# Patient Record
Sex: FEMALE | Race: WHITE | Employment: OTHER | URBAN - NONMETROPOLITAN AREA
[De-identification: names, ages, dates, MRNs, and addresses within clinical notes are randomized per-mention and may not be internally consistent; named-entity substitution may affect disease eponyms.]

---

## 2018-01-02 LAB — PAIN MANAGEMENT DRUG PANEL INTERP, URINE: NORMAL

## 2019-05-24 ENCOUNTER — HOSPITAL ENCOUNTER (OUTPATIENT)
Age: 59
Setting detail: OUTPATIENT SURGERY
Discharge: HOME | End: 2019-05-24
Attending: INTERNAL MEDICINE | Admitting: INTERNAL MEDICINE
Payer: MEDICARE

## 2019-05-24 VITALS
DIASTOLIC BLOOD PRESSURE: 51 MMHG | TEMPERATURE: 97.8 F | HEIGHT: 61 IN | BODY MASS INDEX: 44.56 KG/M2 | OXYGEN SATURATION: 95 % | SYSTOLIC BLOOD PRESSURE: 88 MMHG | RESPIRATION RATE: 18 BRPM | WEIGHT: 236 LBS | HEART RATE: 65 BPM

## 2019-05-24 PROCEDURE — 3609015500 HC GASTRIC/DUODENAL MOTILITY &/OR MANOMETRY STUDY: Performed by: INTERNAL MEDICINE

## 2019-05-24 PROCEDURE — 6370000000 HC RX 637 (ALT 250 FOR IP)

## 2019-05-24 PROCEDURE — 2709999900 HC NON-CHARGEABLE SUPPLY: Performed by: INTERNAL MEDICINE

## 2019-05-24 RX ORDER — ASPIRIN 81 MG/1
81 TABLET, CHEWABLE ORAL DAILY
Status: ON HOLD | COMMUNITY
End: 2021-06-16 | Stop reason: HOSPADM

## 2019-05-24 RX ORDER — ALPRAZOLAM 1 MG/1
1 TABLET ORAL NIGHTLY PRN
Status: ON HOLD | COMMUNITY
End: 2021-06-10 | Stop reason: ALTCHOICE

## 2019-05-24 ASSESSMENT — PAIN - FUNCTIONAL ASSESSMENT: PAIN_FUNCTIONAL_ASSESSMENT: 0-10

## 2019-05-24 NOTE — PROGRESS NOTES
[facility]  Endoscopy  Nurse's Progress Note    EFT     Patient admitted to Endoscopy for EFT consent signed  Manometry probe passed through right nare into the stomach. Liquid swallows at  50 cm. Patient tolerated well. Discharged home per self.     Josh Zacarias RN   5/24/2019  10:47 AM

## 2019-05-31 NOTE — PROCEDURES
[address] [place]  [address]                High Resolution Esophageal Manometry Study      Patient:  Kya Koehler    [id number] Gender: Female Physician: DR. Aldair Pendleton     / Age: 1960 : Kamila Veras RN    Height: 5 ft 1 in Referring Physician: DR. Adina Biswas    Procedure: Esophageal Manometry with Impedance Examination Date: 2019     Lower Esophageal Sphincter Region  Normal Esophageal Motility  Normal   Landmarks   Number of swallows evaluated 10        LES midpoint (from nares)(cm) 44.0  High Resolution Parameters         Proximal LES (from nares)(cm) 43.0      Distal contractile integral(mean)(mmHg-cm-s) 5607.5 500-5000       Distal LES (from nares)(cm) 47.0      Distal contractile integral(highest)(mmHg-cm-s) 8899.8        LES length(cm) 4.0 2.7-4.8     Contractile front velocity(cm/s) 1.6 <9.0       Esophageal length (LES-UES centers)(cm) 25.0  Wann Classification         PIP (from nares)(cm) 44.5      Distal latency 3.7        Intraabdominal LES length(cm) 2.5      % failed (Wann Classification) 10        Hiatal hernia? No      % panesophageal pressurization 10    LES Pressures       % premature contraction 30        Pressure mia. method eSleeve,IRP      % rapid contraction 0        Basal (respiratory min. )(mmHg) 19.3 4.8-32.0     % large breaks 0        Basal (respiratory mean)(mmHg) 30.0 13-43     % small breaks 0        Residual (mean)(mmHg) 14.2 <15.0 Impedance analysis            Incomplete bolus clearance(%) 10            Upper Esophageal Sphincter  Normal Pharyngeal / UES Motility  Normal   Location (center, fr. nares)(cm) 19.0  No. swallows evaluated 10    Mean basal pressure(mmHg) 49.4  Evaluated @ 3.0 & N/A above UES     Mean residual pressure(mmHg) 1.2 <12.0     Mean peak pressure(mmHg) 9.5           Wann Classification Findings*     EGJ: Normal Relaxation          Mean IRP (14.2 mmHg) is less than 15 mmHg  Esophageal body: Spastic esophagus          % premature contraction (30%) is greater than 20%  Finding: Distal Esophageal Spasm; no EGJ outflow obstruction  EGJ: Normal Relaxation          Mean IRP (14.2 mmHg) is less than 15 mmHg  Esophageal body: Hypercontractile          At least one swallow with DCI greater than 8000  Finding: Hypercontractile Esophagus; no EGJ outflow obstruction    * Findings are based on published [place] Classification scheme and are only intended to serve as a guide for patient diagnosis     Procedure   After confirmation of potential allergies, a topical analgesic was used to numb the nares followed by trans-nasal insertion of a High Resolution Manometry Catheter. Pressure bands of both UES and LES were observed on the color contour. Patient instructed to take deep breath to verify placement of catheter; diaphragmatic pinch noted on inspiration. Patient was assisted to supine position and catheter was stabilized. Patient encouraged to relax while acclimating to catheter for approximately 5 minutes. A 30 second baseline pressure was obtained to identify the UES and LES followed by a series of wet swallows using 5 ccs room temperature water to assess esophageal motility. At the conclusion of the procedure; the catheter was removed. Indications   DYSPHAGIA     Interpretation / Findings   UES Pressure and relaxation:  normal  Peristalsis: premature/spastic  Esophageal pressures:  high  GEJ relaxation:  complete  Incomplete bolus clearance: <10%     Impressions   Hypercontractile esophagus, aka \"Nutcracker esophagus\" or \"Jackhammer esophagus\"  Distal Esophageal Spasm  Usually these disorders are related to GERD. Recommend aggressive treatment of GERD. Nitrates or calcium channel blockers may also be used especially with dysphagia.       Jah Hernandez MD  3:39 PM 5/31/2019

## 2021-06-09 ENCOUNTER — HOSPITAL ENCOUNTER (INPATIENT)
Age: 61
LOS: 6 days | Discharge: HOME | DRG: 242 | End: 2021-06-16
Attending: FAMILY MEDICINE | Admitting: FAMILY MEDICINE
Payer: MEDICARE

## 2021-06-09 DIAGNOSIS — D50.8 IRON DEFICIENCY ANEMIA SECONDARY TO INADEQUATE DIETARY IRON INTAKE: Primary | ICD-10-CM

## 2021-06-09 DIAGNOSIS — E11.9 TYPE 2 DIABETES MELLITUS WITHOUT COMPLICATION, WITHOUT LONG-TERM CURRENT USE OF INSULIN (HCC): ICD-10-CM

## 2021-06-09 PROBLEM — K92.2 GI BLEED: Status: ACTIVE | Noted: 2021-06-09

## 2021-06-09 LAB
ALBUMIN SERPL-MCNC: 3.9 G/DL (ref 3.5–5.1)
ALLEN TEST: NORMAL
ALP BLD-CCNC: 91 U/L (ref 38–126)
ALT SERPL-CCNC: 18 U/L (ref 11–66)
ANION GAP SERPL CALCULATED.3IONS-SCNC: 12 MEQ/L (ref 8–16)
ANISOCYTOSIS: PRESENT
AST SERPL-CCNC: 21 U/L (ref 5–40)
BASE EXCESS (CALCULATED): 0.3 MMOL/L (ref -2.5–2.5)
BASOPHILS # BLD: 0.6 %
BASOPHILS ABSOLUTE: 0.1 THOU/MM3 (ref 0–0.1)
BILIRUB SERPL-MCNC: 1.2 MG/DL (ref 0.3–1.2)
BILIRUBIN URINE: NEGATIVE
BLOOD, URINE: NEGATIVE
BUN BLDV-MCNC: 36 MG/DL (ref 7–22)
CALCIUM SERPL-MCNC: 10 MG/DL (ref 8.5–10.5)
CHARACTER, URINE: CLEAR
CHLORIDE BLD-SCNC: 103 MEQ/L (ref 98–111)
CO2: 25 MEQ/L (ref 23–33)
COLLECTED BY:: NORMAL
COLOR: YELLOW
CREAT SERPL-MCNC: 1.1 MG/DL (ref 0.4–1.2)
DEVICE: NORMAL
EKG ATRIAL RATE: 52 BPM
EKG Q-T INTERVAL: 520 MS
EKG QRS DURATION: 102 MS
EKG QTC CALCULATION (BAZETT): 455 MS
EKG R AXIS: 74 DEGREES
EKG T AXIS: 136 DEGREES
EKG VENTRICULAR RATE: 46 BPM
EOSINOPHIL # BLD: 0.9 %
EOSINOPHILS ABSOLUTE: 0.1 THOU/MM3 (ref 0–0.4)
ERYTHROCYTE [DISTWIDTH] IN BLOOD BY AUTOMATED COUNT: 19.3 % (ref 11.5–14.5)
ERYTHROCYTE [DISTWIDTH] IN BLOOD BY AUTOMATED COUNT: 68 FL (ref 35–45)
FERRITIN: 97 NG/ML (ref 10–291)
FOLATE: > 20 NG/ML (ref 4.8–24.2)
GFR SERPL CREATININE-BSD FRML MDRD: 50 ML/MIN/1.73M2
GLUCOSE BLD-MCNC: 102 MG/DL (ref 70–108)
GLUCOSE BLD-MCNC: 127 MG/DL (ref 70–108)
GLUCOSE BLD-MCNC: 97 MG/DL (ref 70–108)
GLUCOSE URINE: NEGATIVE MG/DL
HCO3: 26 MMOL/L (ref 23–28)
HCT VFR BLD CALC: 29.1 % (ref 37–47)
HCT VFR BLD CALC: 31.7 % (ref 37–47)
HEMOGLOBIN: 9.1 GM/DL (ref 12–16)
HEMOGLOBIN: 9.8 GM/DL (ref 12–16)
IFIO2: 3
IMMATURE GRANS (ABS): 0.05 THOU/MM3 (ref 0–0.07)
IMMATURE GRANULOCYTES: 0.6 %
IRON SATURATION: 49 % (ref 20–50)
IRON: 153 UG/DL (ref 50–170)
KETONES, URINE: 15
LEUKOCYTE ESTERASE, URINE: NEGATIVE
LYMPHOCYTES # BLD: 15.2 %
LYMPHOCYTES ABSOLUTE: 1.3 THOU/MM3 (ref 1–4.8)
MAGNESIUM: 1.6 MG/DL (ref 1.6–2.4)
MCH RBC QN AUTO: 31.5 PG (ref 26–33)
MCHC RBC AUTO-ENTMCNC: 30.9 GM/DL (ref 32.2–35.5)
MCV RBC AUTO: 101.9 FL (ref 81–99)
MONOCYTES # BLD: 9.9 %
MONOCYTES ABSOLUTE: 0.9 THOU/MM3 (ref 0.4–1.3)
NITRITE, URINE: NEGATIVE
NUCLEATED RED BLOOD CELLS: 1 /100 WBC
O2 SATURATION: 96 %
PCO2: 45 MMHG (ref 35–45)
PH BLOOD GAS: 7.37 (ref 7.35–7.45)
PH UA: 5.5 (ref 5–9)
PLATELET # BLD: 230 THOU/MM3 (ref 130–400)
PMV BLD AUTO: 9.7 FL (ref 9.4–12.4)
PO2: 82 MMHG (ref 71–104)
POTASSIUM SERPL-SCNC: 4.4 MEQ/L (ref 3.5–5.2)
PRO-BNP: 3888 PG/ML (ref 0–900)
PROTEIN UA: NEGATIVE
RBC # BLD: 3.11 MILL/MM3 (ref 4.2–5.4)
SEG NEUTROPHILS: 72.8 %
SEGMENTED NEUTROPHILS ABSOLUTE COUNT: 6.3 THOU/MM3 (ref 1.8–7.7)
SODIUM BLD-SCNC: 140 MEQ/L (ref 135–145)
SOURCE, BLOOD GAS: NORMAL
SPECIFIC GRAVITY, URINE: 1.02 (ref 1–1.03)
TOTAL IRON BINDING CAPACITY: 315 UG/DL (ref 171–450)
TOTAL PROTEIN: 6.7 G/DL (ref 6.1–8)
TROPONIN T: 0.12 NG/ML
TSH SERPL DL<=0.05 MIU/L-ACNC: 2.2 UIU/ML (ref 0.4–4.2)
UROBILINOGEN, URINE: 0.2 EU/DL (ref 0–1)
VITAMIN B-12: > 2000 PG/ML (ref 211–911)
WBC # BLD: 8.7 THOU/MM3 (ref 4.8–10.8)

## 2021-06-09 PROCEDURE — 6360000002 HC RX W HCPCS: Performed by: STUDENT IN AN ORGANIZED HEALTH CARE EDUCATION/TRAINING PROGRAM

## 2021-06-09 PROCEDURE — 83880 ASSAY OF NATRIURETIC PEPTIDE: CPT

## 2021-06-09 PROCEDURE — 2580000003 HC RX 258: Performed by: STUDENT IN AN ORGANIZED HEALTH CARE EDUCATION/TRAINING PROGRAM

## 2021-06-09 PROCEDURE — 2060000000 HC ICU INTERMEDIATE R&B

## 2021-06-09 PROCEDURE — 84443 ASSAY THYROID STIM HORMONE: CPT

## 2021-06-09 PROCEDURE — 83540 ASSAY OF IRON: CPT

## 2021-06-09 PROCEDURE — 85018 HEMOGLOBIN: CPT

## 2021-06-09 PROCEDURE — 6370000000 HC RX 637 (ALT 250 FOR IP): Performed by: STUDENT IN AN ORGANIZED HEALTH CARE EDUCATION/TRAINING PROGRAM

## 2021-06-09 PROCEDURE — 83735 ASSAY OF MAGNESIUM: CPT

## 2021-06-09 PROCEDURE — 81003 URINALYSIS AUTO W/O SCOPE: CPT

## 2021-06-09 PROCEDURE — 93010 ELECTROCARDIOGRAM REPORT: CPT | Performed by: NUCLEAR MEDICINE

## 2021-06-09 PROCEDURE — 82948 REAGENT STRIP/BLOOD GLUCOSE: CPT

## 2021-06-09 PROCEDURE — 82803 BLOOD GASES ANY COMBINATION: CPT

## 2021-06-09 PROCEDURE — 2700000000 HC OXYGEN THERAPY PER DAY

## 2021-06-09 PROCEDURE — 99223 1ST HOSP IP/OBS HIGH 75: CPT | Performed by: INTERNAL MEDICINE

## 2021-06-09 PROCEDURE — C9113 INJ PANTOPRAZOLE SODIUM, VIA: HCPCS | Performed by: STUDENT IN AN ORGANIZED HEALTH CARE EDUCATION/TRAINING PROGRAM

## 2021-06-09 PROCEDURE — 99223 1ST HOSP IP/OBS HIGH 75: CPT | Performed by: FAMILY MEDICINE

## 2021-06-09 PROCEDURE — 82746 ASSAY OF FOLIC ACID SERUM: CPT

## 2021-06-09 PROCEDURE — 80053 COMPREHEN METABOLIC PANEL: CPT

## 2021-06-09 PROCEDURE — 85014 HEMATOCRIT: CPT

## 2021-06-09 PROCEDURE — 82728 ASSAY OF FERRITIN: CPT

## 2021-06-09 PROCEDURE — 94760 N-INVAS EAR/PLS OXIMETRY 1: CPT

## 2021-06-09 PROCEDURE — 36415 COLL VENOUS BLD VENIPUNCTURE: CPT

## 2021-06-09 PROCEDURE — 84484 ASSAY OF TROPONIN QUANT: CPT

## 2021-06-09 PROCEDURE — 93005 ELECTROCARDIOGRAM TRACING: CPT | Performed by: FAMILY MEDICINE

## 2021-06-09 PROCEDURE — 36600 WITHDRAWAL OF ARTERIAL BLOOD: CPT

## 2021-06-09 PROCEDURE — 82607 VITAMIN B-12: CPT

## 2021-06-09 PROCEDURE — 85025 COMPLETE CBC W/AUTO DIFF WBC: CPT

## 2021-06-09 PROCEDURE — 83550 IRON BINDING TEST: CPT

## 2021-06-09 RX ORDER — ONDANSETRON 4 MG/1
4 TABLET, ORALLY DISINTEGRATING ORAL EVERY 8 HOURS PRN
Status: DISCONTINUED | OUTPATIENT
Start: 2021-06-09 | End: 2021-06-16 | Stop reason: HOSPADM

## 2021-06-09 RX ORDER — ATORVASTATIN CALCIUM 40 MG/1
40 TABLET, FILM COATED ORAL DAILY
COMMUNITY
End: 2022-02-22 | Stop reason: SDUPTHER

## 2021-06-09 RX ORDER — PANTOPRAZOLE SODIUM 40 MG/1
40 TABLET, DELAYED RELEASE ORAL
Status: DISCONTINUED | OUTPATIENT
Start: 2021-06-12 | End: 2021-06-09

## 2021-06-09 RX ORDER — BUDESONIDE AND FORMOTEROL FUMARATE DIHYDRATE 160; 4.5 UG/1; UG/1
2 AEROSOL RESPIRATORY (INHALATION) 2 TIMES DAILY
Status: ON HOLD | COMMUNITY
End: 2021-06-10 | Stop reason: ALTCHOICE

## 2021-06-09 RX ORDER — ACETAMINOPHEN 325 MG/1
650 TABLET ORAL EVERY 6 HOURS PRN
Status: DISCONTINUED | OUTPATIENT
Start: 2021-06-09 | End: 2021-06-16 | Stop reason: HOSPADM

## 2021-06-09 RX ORDER — SODIUM CHLORIDE 0.9 % (FLUSH) 0.9 %
5-40 SYRINGE (ML) INJECTION PRN
Status: DISCONTINUED | OUTPATIENT
Start: 2021-06-09 | End: 2021-06-16 | Stop reason: HOSPADM

## 2021-06-09 RX ORDER — ONDANSETRON 2 MG/ML
4 INJECTION INTRAMUSCULAR; INTRAVENOUS EVERY 6 HOURS PRN
Status: DISCONTINUED | OUTPATIENT
Start: 2021-06-09 | End: 2021-06-16 | Stop reason: HOSPADM

## 2021-06-09 RX ORDER — NICOTINE POLACRILEX 4 MG
15 LOZENGE BUCCAL PRN
Status: DISCONTINUED | OUTPATIENT
Start: 2021-06-09 | End: 2021-06-16 | Stop reason: HOSPADM

## 2021-06-09 RX ORDER — POLYETHYLENE GLYCOL 3350 17 G/17G
17 POWDER, FOR SOLUTION ORAL DAILY PRN
Status: DISCONTINUED | OUTPATIENT
Start: 2021-06-09 | End: 2021-06-16 | Stop reason: HOSPADM

## 2021-06-09 RX ORDER — SODIUM CHLORIDE 9 MG/ML
INJECTION, SOLUTION INTRAVENOUS CONTINUOUS
Status: DISCONTINUED | OUTPATIENT
Start: 2021-06-09 | End: 2021-06-11 | Stop reason: SDUPTHER

## 2021-06-09 RX ORDER — ACETAMINOPHEN 500 MG
1000 TABLET ORAL 3 TIMES DAILY PRN
Status: ON HOLD | COMMUNITY
End: 2021-06-10 | Stop reason: ALTCHOICE

## 2021-06-09 RX ORDER — SODIUM CHLORIDE 0.9 % (FLUSH) 0.9 %
5-40 SYRINGE (ML) INJECTION EVERY 12 HOURS SCHEDULED
Status: DISCONTINUED | OUTPATIENT
Start: 2021-06-09 | End: 2021-06-16 | Stop reason: HOSPADM

## 2021-06-09 RX ORDER — DEXTROSE MONOHYDRATE 25 G/50ML
12.5 INJECTION, SOLUTION INTRAVENOUS PRN
Status: DISCONTINUED | OUTPATIENT
Start: 2021-06-09 | End: 2021-06-16 | Stop reason: HOSPADM

## 2021-06-09 RX ORDER — ATROPINE SULFATE 0.4 MG/ML
0.4 AMPUL (ML) INJECTION
Status: DISPENSED | OUTPATIENT
Start: 2021-06-09 | End: 2021-06-09

## 2021-06-09 RX ORDER — DEXTROSE MONOHYDRATE 50 MG/ML
100 INJECTION, SOLUTION INTRAVENOUS PRN
Status: DISCONTINUED | OUTPATIENT
Start: 2021-06-09 | End: 2021-06-16 | Stop reason: HOSPADM

## 2021-06-09 RX ORDER — ALPRAZOLAM 0.5 MG/1
1 TABLET ORAL NIGHTLY PRN
Status: DISCONTINUED | OUTPATIENT
Start: 2021-06-09 | End: 2021-06-10

## 2021-06-09 RX ORDER — ACETAMINOPHEN 650 MG/1
650 SUPPOSITORY RECTAL EVERY 6 HOURS PRN
Status: DISCONTINUED | OUTPATIENT
Start: 2021-06-09 | End: 2021-06-16 | Stop reason: HOSPADM

## 2021-06-09 RX ORDER — SODIUM CHLORIDE 9 MG/ML
25 INJECTION, SOLUTION INTRAVENOUS PRN
Status: DISCONTINUED | OUTPATIENT
Start: 2021-06-09 | End: 2021-06-16 | Stop reason: HOSPADM

## 2021-06-09 RX ADMIN — SODIUM CHLORIDE 80 MG: 9 INJECTION, SOLUTION INTRAVENOUS at 13:48

## 2021-06-09 RX ADMIN — SODIUM CHLORIDE 8 MG/HR: 9 INJECTION, SOLUTION INTRAVENOUS at 22:34

## 2021-06-09 RX ADMIN — SODIUM CHLORIDE 8 MG/HR: 9 INJECTION, SOLUTION INTRAVENOUS at 14:23

## 2021-06-09 RX ADMIN — ALPRAZOLAM 1 MG: 0.5 TABLET ORAL at 20:01

## 2021-06-09 RX ADMIN — SODIUM CHLORIDE: 9 INJECTION, SOLUTION INTRAVENOUS at 13:17

## 2021-06-09 RX ADMIN — SODIUM CHLORIDE: 9 INJECTION, SOLUTION INTRAVENOUS at 22:36

## 2021-06-09 NOTE — PROGRESS NOTES
Unable to complete home medication reconciliation at this time. List of home medications provided from [facility] does not include doses or frequencies. Will have to call patient's PCP or pharmacy tomorrow during operating hours. Dr. Rafa Heck notified. 1852: This RN spoke with patient's roommate, Edilia Mohs. Geeta Og was instructed to bring in patient's pill bottles tomorrow with her so med reconciliation can be completed.

## 2021-06-09 NOTE — H&P
HISTORY & PHYSICAL       Patient:  Yamilet Pritchard  YOB: 1960    MRN: [id number]     Acct: [de-identified]    PCP: Sury Ty MD    Date of Admission: 6/9/2021    Date of Service: Pt seen/examined on 06/09/21 and Admitted to Inpatient with expected LOS greater than two midnights due to medical therapy. ASSESSMENT/PLAN:  Acute on chronic symptomatic anemia, unclear etiology:  - Patient follows with Hematology, Dr. Mehran Coulter. Scheduled for EGD at some point next week, unclear with who. Hgb noted at 7.5 at [facility], baseline noted at 9.5 in April 2021. S/p 2 units PRBCs.  - No active bleeding noted at this time.   - (+) fatigue, AMS?  - Repeat H/H, monitor [id number] thereafter; transfuse if Hgb < 8   - Will check iron studies  - Protonix bolus + infusion  - Hold anticoagulation  - NPO now  - Consult GI for further recommendations - ? Need for EGD    Elevated troponin:  - Likely demand ischemia. Troponin 0.25 < 0.27  - EKG with junctional rhythm. No chest pain  - Will repeat Troponin at this time  - Consult Cardiology given bradycardia as noted below    Junctional rhythm with bradycardia:  - Continue Telemetry monitoring  - Consult Cardiology for further recommendations    Chronic hypoxic respiratory failure:  - On 3-4L NC at home at rest    Acute encephalopathy?:  - Patient is AAOx3 but noted to be somnolent/lethargic. Unclear baseline mentation  - POCT glucose wnl. U/A at [facility] wnl.   - Will get stat ABG and repeat U/A. Check lytes, BNP. Check TSH  - No other signs of infection or metabolic abnormalities noted at this time      DVT prophylaxis: [] Lovenox                                 [x] SCDs                                 [] SQ Heparin                                 [] Encourage ambulation           [] Already on Anticoagulation    Code Status: Full Code  PT/OT Eval Status: N/A  Disposition: Direct admitted to .      Chief Complaint:  Fatigue       History Of Present Illness:   Patient is a tyron historian. History obtained from patient and chart review. 57 y.o. female who presented to [facility] with complaints of fatigue x 3-4 weeks. PMHx significant for GERD and anxiety as per chart review. Patient was take to [facility] by her roommate who reports that patient has not been herself for the past 3-4 weeks and is always feeling tired. As per patient, she states that she was to have a \"scope\" because her blood counts have been low. Patient is not able to elaborate on this - unsure who her GI doctor is and which procedure she was to have done. Patient states her PCP sent her to [facility] due to her \"blood being low. \" Patient denies any hemoptysis, hematochezia, or hematuria. She denies any fevers, chills, chest pain, abdominal pain, N/V/D otherwise. She is always short of breath and chronically uses oxygen at home. Patient not able to tell me if she takes any blood thinners or NSAIDs. Records from [facility] reviewed. Labs showed BMP with BUN/Cr 35/1.2 Ca 10.4. CBC showed H/H 7.5/22.5, MCV 99.9, WBC wnl. Troponin 0.25, 0.27. XR showed stable cardiomegaly, no evidence of CHF. Patient was given 2 units PRBCs at [facility] and transferred to [address] [place] [address] for further evaluation. EKG en route via EMS showed junctional rhythm. Past Medical History:          Diagnosis Date    Anxiety        Past Surgical History:          Procedure Laterality Date    ESOPHAGEAL MOTILITY STUDY Left 5/24/2019    ESOPHAGEAL MOTILITY/MANOMETRY STUDY performed by Deni Salazar MD at [facility] Endoscopy    UPPER GASTROINTESTINAL ENDOSCOPY         Medications Prior to Admission:      Prior to Admission medications    Medication Sig Start Date End Date Taking? Authorizing Provider   aspirin 81 MG chewable tablet Take 81 mg by mouth daily    Historical Provider, MD   ALPRAZolam Hannah Young) 1 MG tablet Take 1 mg by mouth nightly as needed for Sleep.     Historical Provider, MD       Allergies:  Patient has no known allergies. Social History:      The patient currently lives with her roommate. TOBACCO:   has no history on file for tobacco use. ETOH:   has no history on file for alcohol use. Family History:      No family history on file. Diet:  Diet NPO Exceptions are: Ice Chips, Sips of Water with Meds    REVIEW OF SYSTEMS:   Pertinent positives as noted in the HPI. All other systems reviewed and negative. PHYSICAL EXAM:    /76   Pulse (!) 48   Temp 98.3 °F (36.8 °C) (Oral)   Resp 20   SpO2 99%     General appearance: Morbidly obese female. Pale appearance. No acute distress but wants to sleep as she is tired. HEENT:  Normal cephalic, atraumatic without obvious deformity. Pupils equal, round, and reactive to light. Extra ocular muscles intact. Conjunctivae/corneas clear. No pallor noted. Neck: Supple, with full range of motion. No jugular venous distention. Trachea midline. Respiratory: On NC, chronic oxygen use. Normal respiratory effort but getting short of breath with sentences. Decreased breath sounds at bilateral bases. Cardiovascular:  Bradycardia noted  Abdomen: Soft, non-tender, non-distended with normal bowel sounds. Musculoskeletal:  Edematous legs - patient reports chronic. Not pitting - likely secondary to body habitus  Skin: Pale appearance to skin  Neurologic:  Neurovascularly intact without any focal sensory/motor deficits. Psychiatric:  Alert and oriented x3, somnolent   Peripheral Pulses: +2 palpable, equal bilaterally       Labs:     No results for input(s): WBC, HGB, HCT, PLT in the last 72 hours. No results for input(s): NA, K, CL, CO2, BUN, CREATININE, CALCIUM, PHOS in the last 72 hours. Invalid input(s): MAGNES  No results for input(s): AST, ALT, BILIDIR, BILITOT, ALKPHOS in the last 72 hours. No results for input(s): INR in the last 72 hours. No results for input(s): [place] in the last 72 hours.     Urinalysis:      Lab Results   Component Value Date    NITRU NEGATIVE 12/27/2017    WBCUA 3-5 12/27/2017    BACTERIA 2+ 12/27/2017    RBCUA 0-2 12/27/2017    BLOODU NEGATIVE 12/27/2017    SPECGRAV 1.025 12/27/2017    GLUCOSEU 250 12/27/2017       Intake & Output:  No intake/output data recorded. No intake/output data recorded. Radiology:     CXR: I have reviewed the CXR with the following interpretation:   CXR from [facility] reviewed - no CHF noted    EKG:  I have reviewed the EKG with the following interpretation:  Bradycardia. No AV juwan block noted. Thank you Jasmin Pereira MD for the opportunity to be involved in this patient's care.     Electronically signed by Liu Love MD on 6/9/2021 at 1:00 PM

## 2021-06-09 NOTE — PROGRESS NOTES
Patient admitted to [phone] from [facility] via stretcher with [facility]. Patient is alert, pleasantly confused, and complaining of general discomfort, wanting to get into the bed. 20g PIV located in left AC, INT. VS and assessement data obtained. Patient connected to telemetry. Message sent to Dr. Armida Jeronimo to notify of patient arriving to unit, provider to see patient will be Dr. Sherma Dubin.

## 2021-06-09 NOTE — CONSULTS
[facility] [address] [place] [address]  Cardiology Consult        Patient:  Guanaco Wiley  YOB: 1960  MRN: [id number]     Acct: [de-identified]    PCP: Jasmin Pereira MD    Date of Admission: 6/9/2021      Reason for Consultation:  Bradycardia, GI bleeding      History Of Present Illness:    57 y.o. pleasant female c hx of CAD s/p PCI in 2/20,  DM , who was transferred from [facility].  She was taken by her room mate to [facility] due to feeling not well and tiredness. She has a hx of Anemia, Asthma, COPD, Dementia, Depression, DM, HTN, PVD and Stage 3 CKD. She was apparently scheduled for colonoscopy next week to see where she is loosing blood. H/H 9.8/32, in the past it used to 14/43 in 2017. Patient at present denies any chest pain, sob, palpitations. EKG shows junctional rhythm at HR 46 bpm, NSST. Troponins were mildly at 0.115. Not on any AV juwan blocking agents. Patient is a poor historian and most of the info was obtained from outside records. Past Medical History:          Diagnosis Date    Anxiety        Past Surgical History:          Procedure Laterality Date    ESOPHAGEAL MOTILITY STUDY Left 5/24/2019    ESOPHAGEAL MOTILITY/MANOMETRY STUDY performed by Pamela Zamora MD at [facility] Endoscopy    UPPER GASTROINTESTINAL ENDOSCOPY         Medications Prior to Admission:      Prior to Admission medications    Medication Sig Start Date End Date Taking? Authorizing Provider   aspirin 81 MG chewable tablet Take 81 mg by mouth daily    Historical Provider, MD   ALPRAZokenney Burgess) 1 MG tablet Take 1 mg by mouth nightly as needed for Sleep.     Historical Provider, MD       Current Facility-Administered Medications   Medication Dose Route Frequency Provider Last Rate Last Admin    ALPRAZolam (XANAX) tablet 1 mg  1 mg Oral Nightly PRN Liu Love MD        sodium chloride flush 0.9 % injection 5-40 mL  5-40 mL Intravenous 2 times per day Liu Love MD        sodium chloride flush 0.9 % injection 5-40 mL  5-40 mL Intravenous PRN Emely Boswell MD        0.9 % sodium chloride infusion  25 mL Intravenous PRN Emely Boswell MD        ondansetron (ZOFRAN-ODT) disintegrating tablet 4 mg  4 mg Oral Q8H PRN Emely Boswell MD        Or    ondansetron (ZOFRAN) injection 4 mg  4 mg Intravenous Q6H PRN Emely Boswell MD        polyethylene glycol (GLYCOLAX) packet 17 g  17 g Oral Daily PRN Emely Boswell MD        acetaminophen (TYLENOL) tablet 650 mg  650 mg Oral Q6H PRN Emely Boswell MD        Or   Leticia Ga acetaminophen (TYLENOL) suppository 650 mg  650 mg Rectal Q6H PRN Emely Boswell MD        pantoprazole (PROTONIX) 80 mg in sodium chloride 0.9 % 50 mL bolus  80 mg Intravenous Once Emely Boswell  mL/hr at 06/09/21 1348 80 mg at 06/09/21 1348    pantoprazole (PROTONIX) 80 mg in sodium chloride 0.9 % 100 mL infusion  8 mg/hr Intravenous Continuous Emely Boswell MD        0.9 % sodium chloride infusion   Intravenous Continuous Emely Boswell  mL/hr at 06/09/21 1317 New Bag at 06/09/21 1317       Allergies:  Patient has no known allergies. Social History:    TOBACCO:   has no history on file for tobacco use. ETOH:   has no history on file for alcohol use. Family History:    No family history on file. Review of Systems -   General ROS: negative  Psychological ROS: negative  Hematological and Lymphatic ROS: No history of blood clots or bleeding disorder. Respiratory ROS: no cough, shortness of breath, or wheezing  Cardiovascular ROS: As per HPI  Gastrointestinal ROS: negative  Genito-Urinary ROS: no dysuria, trouble voiding, or hematuria  Musculoskeletal ROS: negative  Neurological ROS: no TIA or stroke symptoms  Dermatological ROS: negative    All others reviewed and are negative.        /76   Pulse (!) 48   Temp 98.3 °F (36.8 °C) (Oral)   Resp 20   SpO2 99%       Physical Examination:   General appearance - alert, in no distress  Mental status - alert, oriented to person, place, and time  Neck - supple, no significant adenopathy, no JVD, or carotid bruits  Chest - clear to auscultation, no wheezes, rales or rhonchi, symmetric air entry  Heart - normal rate, regular rhythm, normal S1, S2, no murmurs, rubs, clicks or gallops  Abdomen - soft, nontender, nondistended, no masses or organomegaly  Neurological - alert, oriented, normal speech, no focal findings or movement disorder noted  Musculoskeletal - no joint tenderness, deformity or swelling  Extremities - peripheral pulses normal, no pedal edema, no clubbing or cyanosis  Skin - normal coloration and turgor, no rashes, no suspicious skin lesions noted      LABS:    Recent Labs     06/09/21  1244   TROPONINT 0.115*     CBC:   Lab Results   Component Value Date    WBC 8.7 06/09/2021    RBC 3.11 06/09/2021    RBC 4.52 12/27/2017    HGB 9.8 06/09/2021    HCT 31.7 06/09/2021    .9 06/09/2021    MCH 31.5 06/09/2021    MCHC 30.9 06/09/2021    RDW 13.5 12/27/2017     06/09/2021    MPV 9.7 06/09/2021     BMP:    Lab Results   Component Value Date     06/09/2021    K 4.4 06/09/2021     06/09/2021    CO2 25 06/09/2021    BUN 36 06/09/2021    LABALBU 3.9 06/09/2021    CREATININE 1.1 06/09/2021    CALCIUM 10.0 06/09/2021    LABGLOM 50 06/09/2021    GLUCOSE 127 06/09/2021    GLUCOSE 107 12/27/2017     Hepatic Function Panel:    Lab Results   Component Value Date    ALKPHOS 91 06/09/2021    ALT 18 06/09/2021    AST 21 06/09/2021    PROT 6.7 06/09/2021    BILITOT 1.2 06/09/2021    BILITOT 0.7 12/27/2017    BILIDIR 0.2 12/27/2017    LABALBU 3.9 06/09/2021     Magnesium:    Lab Results   Component Value Date    MG 1.6 06/09/2021     Warfarin PT/INR:  No components found for: PTPATWAR, PTINRWAR  HgBA1c:    Lab Results   Component Value Date    LABA1C 6.3 12/27/2017     FLP:    Lab Results   Component Value Date    TRIG 124 12/27/2017     TSH:    Lab Results   Component Value Date    TSH 2.200 06/09/2021 BNP: No components found for: PRO-BNP      Assessment/Plan:    Patient Active Problem List   Diagnosis    GI bleed     Confusion/AMS/Tiredness  Elevated troponins  ? GI bleeding  Junctional rhythm at HR 50s    Telemetry  Get 2D Echo  Hold anticoagulation due to concern of GI bleeding   Monitor H/H  Hold AV juwan blocking agents  Monitor BP  Have Atropine by bedside  Further recs based on clinical course and results     Please do note hesitate to contact me for any further questions. Thank you for the opportunity to be involved in this patient's care.     Code Status: Full Code    Electronically signed by Jose Cavanaugh MD on 6/9/2021 at 2:17 PM

## 2021-06-09 NOTE — FLOWSHEET NOTE
Pt was alone at the time of the visit. She seemed very weak and could not converse much but wanted prayer to cope and heal. Prayer was appreciated. 06/09/21 8196   Encounter Summary   Services provided to: Patient   Referral/Consult From: Rounding   Continue Visiting Yes  (6/9 )   Complexity of Encounter Low   Length of Encounter 15 minutes   Routine   Type Initial   Assessment Approachable;Calm   Intervention Prayer;Nurtured hope; Active listening   Outcome Acceptance;Expressed gratitude;Encouraged; Hopeful

## 2021-06-09 NOTE — CONSULTS
Consult History & Physical      Patient:  Soham Chong  YOB: 1960  MRN: [id number]     Acct: [de-identified]    Chief Complaint:  AMS    Date of Service: Pt seen/examined in consultation on 6/9/2021    History Of Present Illness:      57 y.o. female who we are asked to see/evaluate by Dominique Bond MD for medical management of anemia. HPI from chart review and RN. Patient was brought to OSH, by ambulance, because her roommate called the squad due to noticing the patient being more confused and hallucinating. At OSH, she was noted to have anemia with a Hgb of 7.5, received 2 units PRBC. Hgb upon arrival at [facility] was 9.8. She has a history of chronic anemia and follows with Dr. Theodore Jackson. She has a history of IGNACIA and is on iron supplementation. She was scheduled for an endoscopic procedure next week, but she does not know who that was scheduled with. She denies abdominal pain, nausea, vomiting, diarrhea, constipation, melena, and hematochezia, however she is a poor historian and remains confused. UA negative. She was noted to be bradycardic with mild troponin elevation, cardiology consulted. Unable to ascertain if she has ever had an EGD or colonoscopy. According to medication list review, she is on aspirin daily, no other anticoagulation noted. Past Medical History:    Past Medical History:   Diagnosis Date    Anxiety        Home Medications:  Prior to Admission medications    Medication Sig Start Date End Date Taking? Authorizing Provider   aspirin 81 MG chewable tablet Take 81 mg by mouth daily    Historical Provider, MD   ALPRAZolam Marshal Jones) 1 MG tablet Take 1 mg by mouth nightly as needed for Sleep.     Historical Provider, MD       Surgical History:  Past Surgical History:   Procedure Laterality Date    ESOPHAGEAL MOTILITY STUDY Left 5/24/2019    ESOPHAGEAL MOTILITY/MANOMETRY STUDY performed by Graciela Cloud MD at [facility] Endoscopy    [address] [place] [address] History:  No family history on file. Past GI History:  May have seen Dr. Eloina Cheung in the past    Allergies:  Patient has no known allergies. Social History:   TOBACCO:   has no history on file for tobacco use. ETOH:   has no history on file for alcohol use. Review Of Systems  GENERAL: No fever  EYES:  No  blurred vision, double vision   CARDIOVASCULAR: No chest pain or palpitations. RESPIRATORY:  No dyspnea or cough. GI:  See HPI  MUSCULOSKELETAL: No new painful or swollen joints or myalgias. :   No dysuria or hematuria. SKIN:  No rashes or jaundice. NEUROLOGIC: +AMS  PSYCH:  No anxiety or depression. ENDOCRINE:  No polyuria or polydipsia. BLOOD:  +anemia +blood transfusion      PHYSICAL EXAM:  /76   Pulse (!) 48   Temp 98.3 °F (36.8 °C) (Oral)   Resp 20   SpO2 99%     General appearance: Chronically ill appearing female, pleasantly confused, restless  HEENT: Normal cephalic, atraumatic without obvious deformity. Pupils equal, round, and reactive to light. Neck: Supple, with full range of motion. No jugular venous distention. Trachea midline. Respiratory:  Normal respiratory effort. Clear to auscultation, bilaterally without Rales/Wheezes/Rhonchi. Cardiovascular: Regular rate and rhythm without murmurs, rubs or gallops. Abdomen: Soft, obese, non-tender, non-distended with active bowel sounds. Musculoskeletal: No clubbing, cyanosis or edema bilaterally. Skin: Pink, warm, dry. No rashes or lesions. Psychiatric: Alert and oriented to person & year, not month, confused at times    Labs:   Recent Labs     06/09/21  1244   WBC 8.7   HGB 9.8*   HCT 31.7*        Recent Labs     06/09/21  1244      K 4.4      CO2 25   BUN 36*   CREATININE 1.1   CALCIUM 10.0     Recent Labs     06/09/21  1244   AST 21   ALT 18   BILITOT 1.2   ALKPHOS 91     Radiology:   None  Code Status: Full Code    ASSESSMENT:  1. AMS   2. Bradycardia  3.  Acute on chronic anemia- s/p 2

## 2021-06-10 PROBLEM — R00.1 BRADYCARDIA: Status: ACTIVE | Noted: 2021-06-10

## 2021-06-10 LAB
ANION GAP SERPL CALCULATED.3IONS-SCNC: 13 MEQ/L (ref 8–16)
ANISOCYTOSIS: PRESENT
BASOPHILS # BLD: 0.6 %
BASOPHILS ABSOLUTE: 0.1 THOU/MM3 (ref 0–0.1)
BUN BLDV-MCNC: 30 MG/DL (ref 7–22)
CALCIUM SERPL-MCNC: 9 MG/DL (ref 8.5–10.5)
CHLORIDE BLD-SCNC: 106 MEQ/L (ref 98–111)
CO2: 23 MEQ/L (ref 23–33)
CREAT SERPL-MCNC: 1.1 MG/DL (ref 0.4–1.2)
EKG Q-T INTERVAL: 458 MS
EKG QRS DURATION: 116 MS
EKG QTC CALCULATION (BAZETT): 422 MS
EKG R AXIS: 56 DEGREES
EKG T AXIS: -125 DEGREES
EKG VENTRICULAR RATE: 51 BPM
EOSINOPHIL # BLD: 1.9 %
EOSINOPHILS ABSOLUTE: 0.2 THOU/MM3 (ref 0–0.4)
ERYTHROCYTE [DISTWIDTH] IN BLOOD BY AUTOMATED COUNT: 19.3 % (ref 11.5–14.5)
ERYTHROCYTE [DISTWIDTH] IN BLOOD BY AUTOMATED COUNT: 71.4 FL (ref 35–45)
GFR SERPL CREATININE-BSD FRML MDRD: 50 ML/MIN/1.73M2
GLUCOSE BLD-MCNC: 107 MG/DL (ref 70–108)
GLUCOSE BLD-MCNC: 226 MG/DL (ref 70–108)
GLUCOSE BLD-MCNC: 70 MG/DL (ref 70–108)
GLUCOSE BLD-MCNC: 76 MG/DL (ref 70–108)
GLUCOSE BLD-MCNC: 85 MG/DL (ref 70–108)
GLUCOSE BLD-MCNC: 86 MG/DL (ref 70–108)
HCT VFR BLD CALC: 30.9 % (ref 37–47)
HCT VFR BLD CALC: 32 % (ref 37–47)
HCT VFR BLD CALC: 32.4 % (ref 37–47)
HEMOGLOBIN: 10.4 GM/DL (ref 12–16)
HEMOGLOBIN: 9.6 GM/DL (ref 12–16)
HEMOGLOBIN: 9.8 GM/DL (ref 12–16)
IMMATURE GRANS (ABS): 0.04 THOU/MM3 (ref 0–0.07)
IMMATURE GRANULOCYTES: 0.4 %
LV EF: 58 %
LVEF MODALITY: NORMAL
LYMPHOCYTES # BLD: 21.1 %
LYMPHOCYTES ABSOLUTE: 2 THOU/MM3 (ref 1–4.8)
MAGNESIUM: 1.6 MG/DL (ref 1.6–2.4)
MCH RBC QN AUTO: 31.6 PG (ref 26–33)
MCHC RBC AUTO-ENTMCNC: 30 GM/DL (ref 32.2–35.5)
MCV RBC AUTO: 105.3 FL (ref 81–99)
MONOCYTES # BLD: 9.9 %
MONOCYTES ABSOLUTE: 1 THOU/MM3 (ref 0.4–1.3)
NUCLEATED RED BLOOD CELLS: 0 /100 WBC
PLATELET # BLD: 210 THOU/MM3 (ref 130–400)
PMV BLD AUTO: 9.7 FL (ref 9.4–12.4)
POTASSIUM REFLEX MAGNESIUM: 3.6 MEQ/L (ref 3.5–5.2)
RBC # BLD: 3.04 MILL/MM3 (ref 4.2–5.4)
SEG NEUTROPHILS: 66.1 %
SEGMENTED NEUTROPHILS ABSOLUTE COUNT: 6.4 THOU/MM3 (ref 1.8–7.7)
SODIUM BLD-SCNC: 142 MEQ/L (ref 135–145)
TROPONIN T: 0.09 NG/ML
TROPONIN T: 0.12 NG/ML
WBC # BLD: 9.7 THOU/MM3 (ref 4.8–10.8)

## 2021-06-10 PROCEDURE — C9113 INJ PANTOPRAZOLE SODIUM, VIA: HCPCS | Performed by: NURSE PRACTITIONER

## 2021-06-10 PROCEDURE — 85025 COMPLETE CBC W/AUTO DIFF WBC: CPT

## 2021-06-10 PROCEDURE — 80048 BASIC METABOLIC PNL TOTAL CA: CPT

## 2021-06-10 PROCEDURE — 93306 TTE W/DOPPLER COMPLETE: CPT

## 2021-06-10 PROCEDURE — 83735 ASSAY OF MAGNESIUM: CPT

## 2021-06-10 PROCEDURE — 2060000000 HC ICU INTERMEDIATE R&B

## 2021-06-10 PROCEDURE — 99232 SBSQ HOSP IP/OBS MODERATE 35: CPT | Performed by: PHYSICIAN ASSISTANT

## 2021-06-10 PROCEDURE — 6370000000 HC RX 637 (ALT 250 FOR IP): Performed by: HOSPITALIST

## 2021-06-10 PROCEDURE — 2580000003 HC RX 258: Performed by: STUDENT IN AN ORGANIZED HEALTH CARE EDUCATION/TRAINING PROGRAM

## 2021-06-10 PROCEDURE — 36415 COLL VENOUS BLD VENIPUNCTURE: CPT

## 2021-06-10 PROCEDURE — 6360000002 HC RX W HCPCS: Performed by: PHYSICIAN ASSISTANT

## 2021-06-10 PROCEDURE — 85018 HEMOGLOBIN: CPT

## 2021-06-10 PROCEDURE — 84484 ASSAY OF TROPONIN QUANT: CPT

## 2021-06-10 PROCEDURE — 93010 ELECTROCARDIOGRAM REPORT: CPT | Performed by: NUCLEAR MEDICINE

## 2021-06-10 PROCEDURE — 85014 HEMATOCRIT: CPT

## 2021-06-10 PROCEDURE — 99232 SBSQ HOSP IP/OBS MODERATE 35: CPT | Performed by: HOSPITALIST

## 2021-06-10 PROCEDURE — 82948 REAGENT STRIP/BLOOD GLUCOSE: CPT

## 2021-06-10 PROCEDURE — 6360000002 HC RX W HCPCS: Performed by: NURSE PRACTITIONER

## 2021-06-10 PROCEDURE — 93005 ELECTROCARDIOGRAM TRACING: CPT | Performed by: PHYSICIAN ASSISTANT

## 2021-06-10 PROCEDURE — 6370000000 HC RX 637 (ALT 250 FOR IP): Performed by: PHYSICIAN ASSISTANT

## 2021-06-10 RX ORDER — GABAPENTIN 400 MG/1
800 CAPSULE ORAL 3 TIMES DAILY
Status: DISCONTINUED | OUTPATIENT
Start: 2021-06-10 | End: 2021-06-11

## 2021-06-10 RX ORDER — MAGNESIUM SULFATE IN WATER 40 MG/ML
2000 INJECTION, SOLUTION INTRAVENOUS ONCE
Status: COMPLETED | OUTPATIENT
Start: 2021-06-10 | End: 2021-06-10

## 2021-06-10 RX ORDER — METOPROLOL SUCCINATE 25 MG/1
12.5 TABLET, EXTENDED RELEASE ORAL DAILY
COMMUNITY
End: 2022-01-13

## 2021-06-10 RX ORDER — MEMANTINE HYDROCHLORIDE 10 MG/1
10 TABLET ORAL 2 TIMES DAILY
Status: DISCONTINUED | OUTPATIENT
Start: 2021-06-10 | End: 2021-06-16 | Stop reason: HOSPADM

## 2021-06-10 RX ORDER — LANOLIN ALCOHOL/MO/W.PET/CERES
1000 CREAM (GRAM) TOPICAL DAILY
Status: DISCONTINUED | OUTPATIENT
Start: 2021-06-10 | End: 2021-06-16 | Stop reason: HOSPADM

## 2021-06-10 RX ORDER — FOLIC ACID 1 MG/1
1 TABLET ORAL DAILY
COMMUNITY

## 2021-06-10 RX ORDER — CITALOPRAM 20 MG/1
20 TABLET ORAL DAILY
Status: DISCONTINUED | OUTPATIENT
Start: 2021-06-10 | End: 2021-06-16 | Stop reason: HOSPADM

## 2021-06-10 RX ORDER — PANTOPRAZOLE SODIUM 40 MG/10ML
40 INJECTION, POWDER, LYOPHILIZED, FOR SOLUTION INTRAVENOUS 2 TIMES DAILY
Status: DISCONTINUED | OUTPATIENT
Start: 2021-06-10 | End: 2021-06-11

## 2021-06-10 RX ORDER — NICOTINE 21 MG/24HR
1 PATCH, TRANSDERMAL 24 HOURS TRANSDERMAL EVERY 24 HOURS
Status: DISCONTINUED | OUTPATIENT
Start: 2021-06-10 | End: 2021-06-16 | Stop reason: HOSPADM

## 2021-06-10 RX ORDER — FERROUS SULFATE 325(65) MG
325 TABLET ORAL 2 TIMES DAILY
COMMUNITY

## 2021-06-10 RX ORDER — ISOSORBIDE MONONITRATE 30 MG/1
15 TABLET, EXTENDED RELEASE ORAL DAILY
Status: DISCONTINUED | OUTPATIENT
Start: 2021-06-10 | End: 2021-06-16 | Stop reason: HOSPADM

## 2021-06-10 RX ORDER — NICOTINE 21 MG/24HR
1 PATCH, TRANSDERMAL 24 HOURS TRANSDERMAL EVERY 24 HOURS
COMMUNITY
End: 2021-07-20

## 2021-06-10 RX ORDER — NYSTATIN 100000 [USP'U]/G
POWDER TOPICAL 2 TIMES DAILY
COMMUNITY

## 2021-06-10 RX ORDER — POTASSIUM CHLORIDE 20 MEQ/1
40 TABLET, EXTENDED RELEASE ORAL ONCE
Status: COMPLETED | OUTPATIENT
Start: 2021-06-10 | End: 2021-06-10

## 2021-06-10 RX ORDER — PRASUGREL 10 MG/1
10 TABLET, FILM COATED ORAL DAILY
Status: ON HOLD | COMMUNITY
End: 2021-06-16 | Stop reason: SDUPTHER

## 2021-06-10 RX ORDER — LEVOTHYROXINE SODIUM 0.03 MG/1
25 TABLET ORAL DAILY
Status: DISCONTINUED | OUTPATIENT
Start: 2021-06-10 | End: 2021-06-16 | Stop reason: HOSPADM

## 2021-06-10 RX ORDER — FERROUS SULFATE 325(65) MG
325 TABLET ORAL 2 TIMES DAILY
Status: DISCONTINUED | OUTPATIENT
Start: 2021-06-10 | End: 2021-06-16 | Stop reason: HOSPADM

## 2021-06-10 RX ORDER — MEMANTINE HYDROCHLORIDE 28 MG/1
28 CAPSULE, EXTENDED RELEASE ORAL DAILY
COMMUNITY

## 2021-06-10 RX ORDER — ISOSORBIDE MONONITRATE 30 MG/1
15 TABLET, EXTENDED RELEASE ORAL DAILY
COMMUNITY
End: 2022-01-13

## 2021-06-10 RX ORDER — TRAZODONE HYDROCHLORIDE 100 MG/1
100 TABLET ORAL NIGHTLY
Status: DISCONTINUED | OUTPATIENT
Start: 2021-06-10 | End: 2021-06-16 | Stop reason: HOSPADM

## 2021-06-10 RX ORDER — LANOLIN ALCOHOL/MO/W.PET/CERES
1000 CREAM (GRAM) TOPICAL DAILY
COMMUNITY
End: 2022-01-25

## 2021-06-10 RX ORDER — EXENATIDE 2 MG/.85ML
0.85 INJECTION, SUSPENSION, EXTENDED RELEASE SUBCUTANEOUS WEEKLY
COMMUNITY
End: 2022-01-13 | Stop reason: ALTCHOICE

## 2021-06-10 RX ORDER — BUPROPION HYDROCHLORIDE 300 MG/1
300 TABLET ORAL EVERY MORNING
COMMUNITY
End: 2022-01-13 | Stop reason: ALTCHOICE

## 2021-06-10 RX ORDER — CITALOPRAM 20 MG/1
20 TABLET ORAL DAILY
COMMUNITY
End: 2022-01-13 | Stop reason: ALTCHOICE

## 2021-06-10 RX ORDER — BUPROPION HYDROCHLORIDE 300 MG/1
300 TABLET ORAL EVERY MORNING
Status: DISCONTINUED | OUTPATIENT
Start: 2021-06-10 | End: 2021-06-11

## 2021-06-10 RX ORDER — GABAPENTIN 400 MG/1
800 CAPSULE ORAL 2 TIMES DAILY
COMMUNITY

## 2021-06-10 RX ORDER — LEVOTHYROXINE SODIUM 0.03 MG/1
25 TABLET ORAL DAILY
COMMUNITY

## 2021-06-10 RX ORDER — ATORVASTATIN CALCIUM 40 MG/1
40 TABLET, FILM COATED ORAL DAILY
Status: DISCONTINUED | OUTPATIENT
Start: 2021-06-10 | End: 2021-06-16 | Stop reason: HOSPADM

## 2021-06-10 RX ORDER — TRAZODONE HYDROCHLORIDE 100 MG/1
100 TABLET ORAL NIGHTLY
COMMUNITY
End: 2022-09-13

## 2021-06-10 RX ORDER — FOLIC ACID 1 MG/1
1 TABLET ORAL DAILY
Status: DISCONTINUED | OUTPATIENT
Start: 2021-06-10 | End: 2021-06-16 | Stop reason: HOSPADM

## 2021-06-10 RX ADMIN — MEMANTINE HYDROCHLORIDE 10 MG: 10 TABLET, FILM COATED ORAL at 12:42

## 2021-06-10 RX ADMIN — ATORVASTATIN CALCIUM 40 MG: 40 TABLET, FILM COATED ORAL at 12:43

## 2021-06-10 RX ADMIN — SODIUM CHLORIDE, PRESERVATIVE FREE 10 ML: 5 INJECTION INTRAVENOUS at 10:31

## 2021-06-10 RX ADMIN — BUPROPION HYDROCHLORIDE 300 MG: 300 TABLET, EXTENDED RELEASE ORAL at 12:42

## 2021-06-10 RX ADMIN — ISOSORBIDE MONONITRATE 15 MG: 30 TABLET ORAL at 12:42

## 2021-06-10 RX ADMIN — PANTOPRAZOLE SODIUM 40 MG: 40 INJECTION, POWDER, FOR SOLUTION INTRAVENOUS at 10:33

## 2021-06-10 RX ADMIN — LEVOTHYROXINE SODIUM 25 MCG: 0.03 TABLET ORAL at 12:42

## 2021-06-10 RX ADMIN — SODIUM CHLORIDE: 9 INJECTION, SOLUTION INTRAVENOUS at 21:08

## 2021-06-10 RX ADMIN — GABAPENTIN 800 MG: 400 CAPSULE ORAL at 14:52

## 2021-06-10 RX ADMIN — GABAPENTIN 800 MG: 400 CAPSULE ORAL at 21:08

## 2021-06-10 RX ADMIN — POTASSIUM CHLORIDE 40 MEQ: 1500 TABLET, EXTENDED RELEASE ORAL at 14:59

## 2021-06-10 RX ADMIN — Medication 1000 MCG: at 12:42

## 2021-06-10 RX ADMIN — MAGNESIUM SULFATE HEPTAHYDRATE 2000 MG: 40 INJECTION, SOLUTION INTRAVENOUS at 14:59

## 2021-06-10 RX ADMIN — SODIUM CHLORIDE, PRESERVATIVE FREE 10 ML: 5 INJECTION INTRAVENOUS at 21:05

## 2021-06-10 RX ADMIN — MEMANTINE HYDROCHLORIDE 10 MG: 10 TABLET, FILM COATED ORAL at 21:04

## 2021-06-10 RX ADMIN — FOLIC ACID 1 MG: 1 TABLET ORAL at 12:42

## 2021-06-10 RX ADMIN — CITALOPRAM 20 MG: 20 TABLET, FILM COATED ORAL at 12:42

## 2021-06-10 RX ADMIN — TRAZODONE HYDROCHLORIDE 100 MG: 100 TABLET ORAL at 21:04

## 2021-06-10 RX ADMIN — SODIUM CHLORIDE: 9 INJECTION, SOLUTION INTRAVENOUS at 10:31

## 2021-06-10 RX ADMIN — PANTOPRAZOLE SODIUM 40 MG: 40 INJECTION, POWDER, FOR SOLUTION INTRAVENOUS at 21:04

## 2021-06-10 RX ADMIN — FERROUS SULFATE TAB 325 MG (65 MG ELEMENTAL FE) 325 MG: 325 (65 FE) TAB at 12:42

## 2021-06-10 RX ADMIN — FERROUS SULFATE TAB 325 MG (65 MG ELEMENTAL FE) 325 MG: 325 (65 FE) TAB at 21:04

## 2021-06-10 NOTE — PROGRESS NOTES
Cardiology Progress Note      Patient:  Gurdeep Hollis  YOB: 1960  MRN: [id number]   Acct: [de-identified]  Admit Date:  6/9/2021  Primary Cardiologist: dr Maria Luisa Begum    Note per dr Neil Pair Harrison Habermann for Consultation:  Bradycardia, GI bleeding        History Of Present Illness:    57 y.o. pleasant female c hx of CAD s/p PCI in 2/20,  DM , who was transferred from [facility].  She was taken by her room mate to [facility] due to feeling not well and tiredness. She has a hx of Anemia, Asthma, COPD, Dementia, Depression, DM, HTN, PVD and Stage 3 CKD. She was apparently scheduled for colonoscopy next week to see where she is loosing blood. H/H 9.8/32, in the past it used to 14/43 in 2017. Patient at present denies any chest pain, sob, palpitations. EKG shows junctional rhythm at HR 46 bpm, NSST. Troponins were mildly at 0.115. Not on any AV juwan blocking agents. Patient is a poor historian and most of the info was obtained from outside records. \"    Subjective (Events in last 24 hours): pt awake and alert. NAD. Mild conversational sob, chronic per pt. Denies cp, edema, syncope.   Pt has fatigue and intermittent dizziness but unable to give specific details  On 2 l/min O2  Pt not best historian    Objective:   /74   Pulse (!) 45   Temp 98.4 °F (36.9 °C) (Oral)   Resp 18   SpO2 98%        TELEMETRY: junctional 48-50s, as low as 38 during night    Physical Exam:  General Appearance: alert and oriented to person, place and time, in no acute distress  Cardiovascular: normal rate, regular rhythm, normal S1 and S2, no murmurs, rubs, clicks, or gallops, distal pulses intact, no carotid bruits, no JVD  Pulmonary/Chest: clear to auscultation bilaterally- no wheezes, rales or rhonchi, normal air movement, no respiratory distress  Abdomen: soft, non-tender, non-distended, normal bowel sounds, no masses Extremities: no cyanosis, clubbing or edema, pulse   Skin: warm and dry, no rash or erythema  Head: normocephalic and atraumatic  Eyes: pupils equal, round, and reactive to light  Neck: supple and non-tender without mass, no thyromegaly   Neurological: alert, oriented, normal speech, no focal findings or movement disorder noted    Medications:    sodium chloride flush  5-40 mL Intravenous 2 times per day      sodium chloride      pantoprozole (PROTONIX) infusion 8 mg/hr (06/09/21 2234)    sodium chloride 100 mL/hr at 06/09/21 2236    dextrose       ALPRAZolam, 1 mg, Nightly PRN  sodium chloride flush, 5-40 mL, PRN  sodium chloride, 25 mL, PRN  ondansetron, 4 mg, Q8H PRN   Or  ondansetron, 4 mg, Q6H PRN  polyethylene glycol, 17 g, Daily PRN  acetaminophen, 650 mg, Q6H PRN   Or  acetaminophen, 650 mg, Q6H PRN  glucose, 15 g, PRN  dextrose, 12.5 g, PRN  glucagon (rDNA), 1 mg, PRN  dextrose, 100 mL/hr, PRN        Lab Data:    Cardiac Enzymes:  No results for input(s): CKTOTAL, CKMB, CKMBINDEX, TROPONINI in the last 72 hours.     CBC:   Lab Results   Component Value Date    WBC 9.7 06/10/2021    RBC 3.04 06/10/2021    RBC 4.52 12/27/2017    HGB 10.4 06/10/2021    HCT 30.9 06/10/2021     06/10/2021       CMP:    Lab Results   Component Value Date     06/10/2021    K 3.6 06/10/2021     06/10/2021    CO2 23 06/10/2021    BUN 30 06/10/2021    CREATININE 1.1 06/10/2021    AGRATIO 1.1 12/27/2017    LABGLOM 50 06/10/2021    GLUCOSE 76 06/10/2021    GLUCOSE 107 12/27/2017    CALCIUM 9.0 06/10/2021       Hepatic Function Panel:    Lab Results   Component Value Date    ALKPHOS 91 06/09/2021    ALT 18 06/09/2021    AST 21 06/09/2021    PROT 6.7 06/09/2021    BILITOT 1.2 06/09/2021    BILITOT 0.7 12/27/2017    BILIDIR 0.2 12/27/2017    LABALBU 3.9 06/09/2021       Magnesium:    Lab Results   Component Value Date    MG 1.6 06/09/2021       PT/INR:  No results found for: PROTIME, INR    HgBA1c:    Lab Results   Component Value Date    LABA1C 6.3 12/27/2017       FLP:    Lab Results   Component Value Date

## 2021-06-10 NOTE — PLAN OF CARE
Fall risk precautions in place. Bed in lowest position with wheels locked, bed alarm in place and activated, non-skid socks on pt, fall risk id on pt, call light in reach, pt encouraged to call before getting out of bed and for any other needs or c/o. Patient assessed for fall risk; fall precautions initiated. Patient and family instructed about safety devices. Environment kept free of clutter and adequate lighting provided. Bed locked and in lowest position. Call light within reach. Will continue to monitor.

## 2021-06-10 NOTE — CARE COORDINATION
6/10/21, 7:36 AM EDT  DISCHARGE PLANNING EVALUATION:    Yennifer Garay       Admitted: 6/9/2021/ [address] [facility] day: 1   Location: [facility]19/019-A Reason for admit: GI bleed [K92.2]   PMH:  has a past medical history of Agoraphobia, Anemia, Anxiety, Asthma, CAD (coronary artery disease), COPD (chronic obstructive pulmonary disease) ([facility] [address] Dementia ([facility] [address] Depression, Diabetes mellitus ([facility] [address] GERD (gastroesophageal reflux disease), Hypertension, Neuropathy, PTSD (post-traumatic stress disorder), PVD (peripheral vascular disease) ([facility] [address] and Renal disease. Procedure:   ECHO  Barriers to Discharge:  From [facility], Hgb 9.1, diabetes management, Cardiology consult, telemetry, GI consult, IV fluids, Protonix drip,   PCP: Eduard Emerson MD  Readmission Risk Score: 8%    Patient Goals/Plan/Treatment Preferences: Met with Marianela Hill. She currently lives at home with her caregiver Ariana Dee. She lives with her 24/7 and provides all of her care. She has a walker and a wheelchair, she is on home oxygen at 2 liters supplied by [employer]. Plan is to return home at discharge. She denies need for DME and declines HH. Will follow for potential needs and or services. Transportation/Food Security/Housekeeping Addressed:  No issues identified.

## 2021-06-10 NOTE — PROGRESS NOTES
Request was sent to [facility] to obtain the echo report from 1/20/21. When we receive the report we will consult Dr. Seferino Tapia to see if he wants it repeated.

## 2021-06-10 NOTE — PROGRESS NOTES
Hospitalist Progress Note    Patient:  Huong Og      Unit/Bed:4A-19/019-A    YOB: 1960    MRN: [id number]       Acct: [de-identified]     PCP: Chon Maxwell MD    Date of Admission: 6/9/2021    Assessment/Plan:    1. Acute on Chronic symptomatic anemia - Patient follows with Dr. Maksim Moya (hematology). Was scheduled for EGD next week. Hgb was 7.5 at [facility]. Baseline was 9.5. S/p 2 units of pRBC transfusion. GI consult placed. 2. Elevated troponin - Patient is having bradycardia and Junctional rhythm. Cardiology on board. May need to optimize patient if invasive cardiac work up is needed. 3. Junctional rhythm and Bradycardia - management per cardiology  4. Acute encephalopathy?  - Patient's mental status is at her baseline on my exam.  Monitor clinically. Expected discharge date:  n/a    Disposition:    [] Home       [] TCU       [x] Rehab       [] Psych        [] SNF       [] [place]       [] Other-    Chief Complaint: Anemia    Hospital Course:     6/10/21: S/p 2 units of pRBC transfusion. Stable. EGD when stable. Subjective (past 24 hours): Patient was seen and examined at bedside. No acute overnight event. No new complaint. States that she feels improved after transfusion. Appreciate cardio and GI input.        Medications:  Reviewed    Infusion Medications    sodium chloride      sodium chloride 100 mL/hr at 06/10/21 1031    dextrose       Scheduled Medications    pantoprazole  40 mg Intravenous BID    atorvastatin  40 mg Oral Daily    buPROPion  300 mg Oral QAM    citalopram  20 mg Oral Daily    ferrous sulfate  325 mg Oral BID    folic acid  1 mg Oral Daily    gabapentin  800 mg Oral TID    isosorbide mononitrate  15 mg Oral Daily    levothyroxine  25 mcg Oral Daily    nicotine  1 patch Transdermal Q24H    traZODone  100 mg Oral Nightly    vitamin B-12  1,000 mcg Oral Daily    memantine  10 mg Oral BID    insulin lispro  0-6 Units Subcutaneous TID     insulin lispro  0-3 Units Subcutaneous Nightly    magnesium sulfate  2,000 mg Intravenous Once    sodium chloride flush  5-40 mL Intravenous 2 times per day     PRN Meds: sodium chloride flush, sodium chloride, ondansetron **OR** ondansetron, polyethylene glycol, acetaminophen **OR** acetaminophen, glucose, dextrose, glucagon (rDNA), dextrose      Intake/Output Summary (Last 24 hours) at 6/10/2021 1508  Last data filed at 6/10/2021 1412  Gross per 24 hour   Intake 2866.97 ml   Output 400 ml   Net 2466.97 ml       Diet:  ADULT DIET; Full Liquid    Exam:  BP (!) 143/66   Pulse 51   Temp 98.1 °F (36.7 °C) (Oral)   Resp 22   SpO2 97%     General appearance: Morbidly obese female. Pale appearance. No acute distress. HEENT:  Normal cephalic, atraumatic without obvious deformity. Pupils equal, round, and reactive to light. Extra ocular muscles intact. Conjunctivae/corneas clear. No pallor noted. Neck: Supple, with full range of motion. No jugular venous distention. Trachea midline. Respiratory: On NC, chronic oxygen use. Normal respiratory effort but getting short of breath with sentences. Decreased breath sounds at bilateral bases. Cardiovascular:  Bradycardia noted  Abdomen: Soft, non-tender, non-distended with normal bowel sounds. Musculoskeletal:  Edematous legs - patient reports chronic. Not pitting - likely secondary to body habitus  Skin: Pale appearance to skin  Neurologic:  Neurovascularly intact without any focal sensory/motor deficits.    Psychiatric:  Alert and oriented x3, somnolent   Peripheral Pulses: +2 palpable, equal bilaterally       Labs:   Recent Labs     06/09/21  1244 06/09/21  2129 06/10/21  0339 06/10/21  0921   WBC 8.7  --  9.7  --    HGB 9.8* 9.1* 9.6* 10.4*   HCT 31.7* 29.1* 32.0* 30.9*     --  210  --      Recent Labs     06/09/21  1244 06/10/21  0339    142   K 4.4 3.6    106   CO2 25 23   BUN 36* 30*   CREATININE 1.1 1.1   CALCIUM 10.0 9.0     Recent Labs     06/09/21  1244   AST 21   ALT 18   BILITOT 1.2   ALKPHOS 91     No results for input(s): INR in the last 72 hours. No results for input(s): Lorjodi Janae in the last 72 hours. No results for input(s): PROCAL in the last 72 hours. Microbiology:      Urinalysis:      Lab Results   Component Value Date    NITRU NEGATIVE 06/09/2021    WBCUA 3-5 12/27/2017    BACTERIA 2+ 12/27/2017    RBCUA 0-2 12/27/2017    BLOODU NEGATIVE 06/09/2021    SPECGRAV 1.025 12/27/2017    GLUCOSEU NEGATIVE 06/09/2021       Radiology:  No results found.     DVT prophylaxis: [] Lovenox                                 [x] SCDs                                 [] SQ Heparin                                 [] Encourage ambulation           [] Already on Anticoagulation     Code Status: Full Code    Tele:   [x] yes              [] no    Active Hospital Problems    Diagnosis Date Noted    Bradycardia [R00.1] 06/10/2021    GI bleed [K92.2] 06/09/2021       Electronically signed by Deborha Runner, DO on 6/10/2021 at 3:08 PM

## 2021-06-10 NOTE — PROGRESS NOTES
Pharmacy Medication History Note      List of current medications patient is taking is complete. Source of information: [facility] pharmacy Daniel Leon) faxed list, Britney Antonio report    Changes made to medication list:  Medications removed (include reason, ex. therapy complete or physician discontinued): · Albuterol - no recent fill history  · Alprazolam - no recent fill history (last June 2020 per [place])    Medications added/doses adjusted:  · Metoprolol succ 12.5 mg daily  · Nystatin powder twice daily  · Nicotine 14 mcg/24hr patch daily  · Linzess 145 mcg daily  · Vitamin B12 1000 mcg daily  · Bydureon (exenatide) 0.85 mL once weekly  · Trazodone 100 mg nightly  · Gabapentin 800 mg three times daily (filled 4/20/2021 per [place])  · Citalopram 20 mg daily  · Isosorbide mononitrate 15 mg daily  · Bupropion  mg daily  · Memantine ER 28 mg daily  · Prasugrel 10 mg daily  · Levothyroxine 25 mcg daily    Other notes:  · No recent fill history in 2021 for any inhalers per [facility] pharmacy. · Unable to ask patient as patient is confused & no family present.     Jeniffer Ball, PharmD, BCPS, BCCCP  6/10/2021 11:39 AM

## 2021-06-10 NOTE — PROGRESS NOTES
Gastroenterology Progress Note:     Patient Name:  Demarco Weber   MRN: [id number]  [id number]  YOB: 1960  Admit Date: 6/9/2021 12:16 PM  Primary Care Physician: Doreen Canela MD   [phone]     Patient seen and examined. 24 hours events and chart reviewed. Subjective: Patient sleeping in bed, arouses easily. Denies abdominal pain, nausea, & vomiting. Hgb 10.4 She continues to be confused. No bowel movement overnight or today. Objective:  /74   Pulse (!) 45   Temp 98.4 °F (36.9 °C) (Oral)   Resp 18   SpO2 98%     Physical Exam:    General:  Chronically ill appearing female  HEENT: Atraumatic, normocephalic. Moist oral mucous membranes. Neck: Supple without adenopathy, JVD, thyromegaly or masses. Trachea midline. CV: Heart RRR, no murmurs, rubs, gallops. Resp: Even, easy without cough or accessory use. Lungs clear to ascultation bilaterally. Abd: Round, soft, nontender. No hepatosplenomegaly or mass present. Active bowel sounds heard. No distention noted. Ext:  Without cyanosis, clubbing, edema.    Skin: Pink, warm, dry  Neuro:  Alert, oriented x 2, confused, hallucinates at times      Rectal: deferred    Labs:   CBC:   Lab Results   Component Value Date    WBC 9.7 06/10/2021    HGB 10.4 06/10/2021    HCT 30.9 06/10/2021    .3 06/10/2021     06/10/2021     BMP:   Lab Results   Component Value Date     06/10/2021    K 3.6 06/10/2021     06/10/2021    CO2 23 06/10/2021    PHOS 3.1 12/27/2017    BUN 30 06/10/2021    CREATININE 1.1 06/10/2021    CALCIUM 9.0 06/10/2021     Lipids:   Lab Results   Component Value Date    ALKPHOS 91 06/09/2021    ALT 18 06/09/2021    AST 21 06/09/2021    BILITOT 1.2 06/09/2021    BILITOT 0.7 12/27/2017    BILIDIR 0.2 12/27/2017    LABALBU 3.9 06/09/2021     Current Meds:  Scheduled Meds:   sodium chloride flush  5-40 mL Intravenous 2 times per day     Continuous Infusions:   sodium chloride      pantoprozole (PROTONIX) infusion 8 mg/hr (06/09/21 2234)    sodium chloride 100 mL/hr at 06/09/21 2236    dextrose         Assessment:  56 yo F admitted 06/09/21 as a transfer from OSH where she presented for AMS & fatigue. Her roommate called the squad due to the above symptoms. She was noted to have hallucinations. Noted to be anemic, initial Hgb 7.5, PRBC given. H/O chronic anemia & IGNACIA, follows with Dr. Hung Jackson. Very little history could be obtained due to the patient's AMS and no family being present. 1. AMS   2. Bradycardia  3. Acute on chronic anemia- s/p 2 units PRBC- no GI bleeding noted  4. H/O IGNACIA- on PO iron  5. Morbid obesity  6. Anxiety  7.  Elevated troponin     Plan:    · Monitor H & H, transfuse prn  · Nursing to monitor stool output & document  · Change PPI to IVP BID  · Clear liquid diet  · EGD when patient is more stable, given acute AMS will try to avoid anesthesia at this time, inpatient vs outpatient  · Recommend neurology work-up for acute AMS  · Cardiology on board  · Supportive care per primary team  Will follow    Case reviewed and impression/plan reviewed in collaboration with Dr. Nyla Parada  Electronically signed by SHERMAN Jay CNP on 6/10/2021 at 9:38 AM    [facility]

## 2021-06-11 ENCOUNTER — APPOINTMENT (OUTPATIENT)
Dept: CARDIAC CATH/INVASIVE PROCEDURES | Age: 61
DRG: 242 | End: 2021-06-11
Attending: FAMILY MEDICINE
Payer: MEDICARE

## 2021-06-11 LAB
ABO: NORMAL
ANION GAP SERPL CALCULATED.3IONS-SCNC: 8 MEQ/L (ref 8–16)
ANTIBODY SCREEN: NORMAL
APTT: 26.6 SECONDS (ref 22–38)
BUN BLDV-MCNC: 24 MG/DL (ref 7–22)
CALCIUM SERPL-MCNC: 8.6 MG/DL (ref 8.5–10.5)
CHLORIDE BLD-SCNC: 111 MEQ/L (ref 98–111)
CO2: 24 MEQ/L (ref 23–33)
COLLECTED BY:: ABNORMAL
COLLECTED BY:: NORMAL
CREAT SERPL-MCNC: 1 MG/DL (ref 0.4–1.2)
EKG Q-T INTERVAL: 448 MS
EKG QRS DURATION: 100 MS
EKG QTC CALCULATION (BAZETT): 408 MS
EKG R AXIS: 48 DEGREES
EKG T AXIS: -147 DEGREES
EKG VENTRICULAR RATE: 50 BPM
GFR SERPL CREATININE-BSD FRML MDRD: 56 ML/MIN/1.73M2
GLUCOSE BLD-MCNC: 122 MG/DL (ref 70–108)
GLUCOSE BLD-MCNC: 123 MG/DL (ref 70–108)
GLUCOSE BLD-MCNC: 226 MG/DL (ref 70–108)
GLUCOSE BLD-MCNC: 91 MG/DL (ref 70–108)
GLUCOSE BLD-MCNC: 94 MG/DL (ref 70–108)
HCT VFR BLD CALC: 29.9 % (ref 37–47)
HCT VFR BLD CALC: 31.9 % (ref 37–47)
HEMOGLOBIN: 8.9 GM/DL (ref 12–16)
HEMOGLOBIN: 9.4 GM/DL (ref 12–16)
INR BLD: 1.03 (ref 0.85–1.13)
LV EF: 53 %
LVEF MODALITY: NORMAL
MAGNESIUM: 2.2 MG/DL (ref 1.6–2.4)
POC O2 SATURATION: 64 % (ref 94–97)
POC O2 SATURATION: 97 % (ref 94–97)
POTASSIUM SERPL-SCNC: 4.2 MEQ/L (ref 3.5–5.2)
RH FACTOR: NORMAL
SODIUM BLD-SCNC: 143 MEQ/L (ref 135–145)
SOURCE, BLOOD GAS: ABNORMAL
SOURCE, BLOOD GAS: NORMAL
TROPONIN T: 0.1 NG/ML

## 2021-06-11 PROCEDURE — C1887 CATHETER, GUIDING: HCPCS

## 2021-06-11 PROCEDURE — 82810 BLOOD GASES O2 SAT ONLY: CPT

## 2021-06-11 PROCEDURE — 93005 ELECTROCARDIOGRAM TRACING: CPT | Performed by: PHYSICIAN ASSISTANT

## 2021-06-11 PROCEDURE — 2500000003 HC RX 250 WO HCPCS

## 2021-06-11 PROCEDURE — 85730 THROMBOPLASTIN TIME PARTIAL: CPT

## 2021-06-11 PROCEDURE — 86900 BLOOD TYPING SEROLOGIC ABO: CPT

## 2021-06-11 PROCEDURE — 85018 HEMOGLOBIN: CPT

## 2021-06-11 PROCEDURE — 85014 HEMATOCRIT: CPT

## 2021-06-11 PROCEDURE — 6360000004 HC RX CONTRAST MEDICATION: Performed by: INTERNAL MEDICINE

## 2021-06-11 PROCEDURE — B2151ZZ FLUOROSCOPY OF LEFT HEART USING LOW OSMOLAR CONTRAST: ICD-10-PCS | Performed by: INTERNAL MEDICINE

## 2021-06-11 PROCEDURE — 93460 R&L HRT ART/VENTRICLE ANGIO: CPT | Performed by: INTERNAL MEDICINE

## 2021-06-11 PROCEDURE — 6370000000 HC RX 637 (ALT 250 FOR IP): Performed by: PHYSICIAN ASSISTANT

## 2021-06-11 PROCEDURE — 86850 RBC ANTIBODY SCREEN: CPT

## 2021-06-11 PROCEDURE — C1894 INTRO/SHEATH, NON-LASER: HCPCS

## 2021-06-11 PROCEDURE — 36415 COLL VENOUS BLD VENIPUNCTURE: CPT

## 2021-06-11 PROCEDURE — 6370000000 HC RX 637 (ALT 250 FOR IP): Performed by: HOSPITALIST

## 2021-06-11 PROCEDURE — 85610 PROTHROMBIN TIME: CPT

## 2021-06-11 PROCEDURE — 84484 ASSAY OF TROPONIN QUANT: CPT

## 2021-06-11 PROCEDURE — 93320 DOPPLER ECHO COMPLETE: CPT

## 2021-06-11 PROCEDURE — B2111ZZ FLUOROSCOPY OF MULTIPLE CORONARY ARTERIES USING LOW OSMOLAR CONTRAST: ICD-10-PCS | Performed by: INTERNAL MEDICINE

## 2021-06-11 PROCEDURE — 99232 SBSQ HOSP IP/OBS MODERATE 35: CPT | Performed by: PHYSICIAN ASSISTANT

## 2021-06-11 PROCEDURE — 6360000002 HC RX W HCPCS: Performed by: NURSE PRACTITIONER

## 2021-06-11 PROCEDURE — 83735 ASSAY OF MAGNESIUM: CPT

## 2021-06-11 PROCEDURE — 2140000000 HC CCU INTERMEDIATE R&B

## 2021-06-11 PROCEDURE — 93312 ECHO TRANSESOPHAGEAL: CPT

## 2021-06-11 PROCEDURE — 80048 BASIC METABOLIC PNL TOTAL CA: CPT

## 2021-06-11 PROCEDURE — 4A023N8 MEASUREMENT OF CARDIAC SAMPLING AND PRESSURE, BILATERAL, PERCUTANEOUS APPROACH: ICD-10-PCS | Performed by: INTERNAL MEDICINE

## 2021-06-11 PROCEDURE — 93010 ELECTROCARDIOGRAM REPORT: CPT | Performed by: NUCLEAR MEDICINE

## 2021-06-11 PROCEDURE — 86901 BLOOD TYPING SEROLOGIC RH(D): CPT

## 2021-06-11 PROCEDURE — C9113 INJ PANTOPRAZOLE SODIUM, VIA: HCPCS | Performed by: NURSE PRACTITIONER

## 2021-06-11 PROCEDURE — C1760 CLOSURE DEV, VASC: HCPCS

## 2021-06-11 PROCEDURE — 6360000002 HC RX W HCPCS

## 2021-06-11 PROCEDURE — C1769 GUIDE WIRE: HCPCS

## 2021-06-11 PROCEDURE — 99232 SBSQ HOSP IP/OBS MODERATE 35: CPT | Performed by: HOSPITALIST

## 2021-06-11 PROCEDURE — 2580000003 HC RX 258: Performed by: STUDENT IN AN ORGANIZED HEALTH CARE EDUCATION/TRAINING PROGRAM

## 2021-06-11 PROCEDURE — 82948 REAGENT STRIP/BLOOD GLUCOSE: CPT

## 2021-06-11 PROCEDURE — 93325 DOPPLER ECHO COLOR FLOW MAPG: CPT

## 2021-06-11 RX ORDER — SODIUM CHLORIDE 9 MG/ML
25 INJECTION, SOLUTION INTRAVENOUS PRN
Status: DISCONTINUED | OUTPATIENT
Start: 2021-06-11 | End: 2021-06-11 | Stop reason: SDUPTHER

## 2021-06-11 RX ORDER — GABAPENTIN 300 MG/1
300 CAPSULE ORAL 3 TIMES DAILY
Status: DISCONTINUED | OUTPATIENT
Start: 2021-06-11 | End: 2021-06-16 | Stop reason: HOSPADM

## 2021-06-11 RX ORDER — SODIUM CHLORIDE 0.9 % (FLUSH) 0.9 %
5-40 SYRINGE (ML) INJECTION EVERY 12 HOURS SCHEDULED
Status: DISCONTINUED | OUTPATIENT
Start: 2021-06-11 | End: 2021-06-11 | Stop reason: SDUPTHER

## 2021-06-11 RX ORDER — SODIUM CHLORIDE 9 MG/ML
INJECTION, SOLUTION INTRAVENOUS CONTINUOUS
Status: DISCONTINUED | OUTPATIENT
Start: 2021-06-11 | End: 2021-06-12

## 2021-06-11 RX ORDER — ASPIRIN 325 MG
325 TABLET ORAL ONCE
Status: COMPLETED | OUTPATIENT
Start: 2021-06-11 | End: 2021-06-11

## 2021-06-11 RX ORDER — PANTOPRAZOLE SODIUM 40 MG/1
40 TABLET, DELAYED RELEASE ORAL
Status: DISCONTINUED | OUTPATIENT
Start: 2021-06-11 | End: 2021-06-16 | Stop reason: HOSPADM

## 2021-06-11 RX ORDER — SODIUM CHLORIDE 0.9 % (FLUSH) 0.9 %
5-40 SYRINGE (ML) INJECTION PRN
Status: DISCONTINUED | OUTPATIENT
Start: 2021-06-11 | End: 2021-06-11 | Stop reason: SDUPTHER

## 2021-06-11 RX ADMIN — ATORVASTATIN CALCIUM 40 MG: 40 TABLET, FILM COATED ORAL at 10:50

## 2021-06-11 RX ADMIN — FERROUS SULFATE TAB 325 MG (65 MG ELEMENTAL FE) 325 MG: 325 (65 FE) TAB at 10:50

## 2021-06-11 RX ADMIN — MEMANTINE HYDROCHLORIDE 10 MG: 10 TABLET, FILM COATED ORAL at 21:59

## 2021-06-11 RX ADMIN — CITALOPRAM 20 MG: 20 TABLET, FILM COATED ORAL at 10:50

## 2021-06-11 RX ADMIN — Medication 1000 MCG: at 10:50

## 2021-06-11 RX ADMIN — MEMANTINE HYDROCHLORIDE 10 MG: 10 TABLET, FILM COATED ORAL at 10:50

## 2021-06-11 RX ADMIN — ISOSORBIDE MONONITRATE 15 MG: 30 TABLET ORAL at 10:50

## 2021-06-11 RX ADMIN — IOPAMIDOL 80 ML: 755 INJECTION, SOLUTION INTRAVENOUS at 17:01

## 2021-06-11 RX ADMIN — FOLIC ACID 1 MG: 1 TABLET ORAL at 10:50

## 2021-06-11 RX ADMIN — GABAPENTIN 300 MG: 300 CAPSULE ORAL at 10:51

## 2021-06-11 RX ADMIN — FERROUS SULFATE TAB 325 MG (65 MG ELEMENTAL FE) 325 MG: 325 (65 FE) TAB at 21:59

## 2021-06-11 RX ADMIN — SODIUM CHLORIDE: 9 INJECTION, SOLUTION INTRAVENOUS at 08:32

## 2021-06-11 RX ADMIN — ASPIRIN 325 MG: 325 TABLET, FILM COATED ORAL at 13:38

## 2021-06-11 RX ADMIN — SODIUM CHLORIDE, PRESERVATIVE FREE 10 ML: 5 INJECTION INTRAVENOUS at 10:49

## 2021-06-11 RX ADMIN — PANTOPRAZOLE SODIUM 40 MG: 40 INJECTION, POWDER, FOR SOLUTION INTRAVENOUS at 10:49

## 2021-06-11 RX ADMIN — LEVOTHYROXINE SODIUM 25 MCG: 0.03 TABLET ORAL at 06:15

## 2021-06-11 RX ADMIN — TRAZODONE HYDROCHLORIDE 100 MG: 100 TABLET ORAL at 23:09

## 2021-06-11 RX ADMIN — GABAPENTIN 300 MG: 300 CAPSULE ORAL at 21:59

## 2021-06-11 NOTE — PROGRESS NOTES
Hospitalist Progress Note    Patient:  April Acuña      Unit/Bed:4A-19/019-A    YOB: 1960    MRN: [id number]       Acct: [de-identified]     PCP: Jillian Sharma MD    Date of Admission: 6/9/2021    Assessment/Plan:    1. Acute on Chronic symptomatic anemia - Patient follows with Dr. Rochelle Mcnulty (hematology). Was scheduled for EGD next week. Hgb was 7.5 at [facility]. Baseline was 9.5. S/p 2 units of pRBC transfusion. GI consult placed. Hgb level maintaining well. No sign of active bleeding at this time. 2. Elevated troponin - Cardiac cath planned by cardiology. 3. Junctional rhythm and Bradycardia - Plan management per cardiology. 4. Acute encephalopathy?  - Probably from polypharmacy. Discontinued Wellbutrin and decreased dose of Gabapentin. Monitor clinically. Expected discharge date:  n/a    Disposition:    [] Home       [] TCU       [x] Rehab       [] Psych        [] SNF       [] [place]       [] Other-    Chief Complaint: Anemia    Hospital Course:     6/10/21: S/p 2 units of pRBC transfusion. Stable. EGD when stable. Subjective (past 24 hours): Patient was seen and examined at bedside. Patient was obtunded after given home meds. Now awake and alert and back to baseline. Appreciate cardio and GI input. Cardiac cath planned for today.         Medications:  Reviewed    Infusion Medications    sodium chloride      sodium chloride 100 mL/hr at 06/11/21 5829    dextrose       Scheduled Medications    gabapentin  300 mg Oral TID    pantoprazole  40 mg Intravenous BID    atorvastatin  40 mg Oral Daily    citalopram  20 mg Oral Daily    ferrous sulfate  325 mg Oral BID    folic acid  1 mg Oral Daily    isosorbide mononitrate  15 mg Oral Daily    levothyroxine  25 mcg Oral Daily    nicotine  1 patch Transdermal Q24H    traZODone  100 mg Oral Nightly    vitamin B-12  1,000 mcg Oral Daily    memantine  10 mg Oral BID    insulin lispro 0-6 Units Subcutaneous TID     insulin lispro  0-3 Units Subcutaneous Nightly    sodium chloride flush  5-40 mL Intravenous 2 times per day     PRN Meds: sodium chloride flush, sodium chloride, ondansetron **OR** ondansetron, polyethylene glycol, acetaminophen **OR** acetaminophen, glucose, dextrose, glucagon (rDNA), dextrose      Intake/Output Summary (Last 24 hours) at 6/11/2021 1047  Last data filed at 6/11/2021 0356  Gross per 24 hour   Intake 4390.66 ml   Output 1 ml   Net 4389.66 ml       Diet:  Diet NPO Exceptions are: Ice Chips, Sips of Water with Meds    Exam:  /62   Pulse 57   Temp 98.2 °F (36.8 °C) (Oral)   Resp 20   Wt 249 lb 8 oz (113.2 kg)   SpO2 98%   BMI 47.14 kg/m²     General appearance: Morbidly obese female. Pale appearance. No acute distress. HEENT:  Normal cephalic, atraumatic without obvious deformity. Pupils equal, round, and reactive to light. Extra ocular muscles intact. Conjunctivae/corneas clear. No pallor noted. Neck: Supple, with full range of motion. No jugular venous distention. Trachea midline. Respiratory: On NC, chronic oxygen use. Normal respiratory effort but getting short of breath with sentences. Decreased breath sounds at bilateral bases. Cardiovascular:  Bradycardia noted  Abdomen: Soft, non-tender, non-distended with normal bowel sounds. Musculoskeletal:  Edematous legs - patient reports chronic. Not pitting - likely secondary to body habitus  Skin: Pale appearance to skin  Neurologic:  Neurovascularly intact without any focal sensory/motor deficits.    Psychiatric:  Alert and oriented x3, somnolent   Peripheral Pulses: +2 palpable, equal bilaterally       Labs:   Recent Labs     06/09/21  1244 06/10/21  0339 06/10/21  1854 06/11/21  0135 06/11/21  0847   WBC 8.7 9.7  --   --   --    HGB 9.8* 9.6* 9.8* 8.9* 9.4*   HCT 31.7* 32.0* 32.4* 29.9* 31.9*    210  --   --   --      Recent Labs     06/09/21  1244 06/10/21  0339 06/11/21  0330    142 143   K 4.4 3.6 4.2    106 111   CO2 25 23 24   BUN 36* 30* 24*   CREATININE 1.1 1.1 1.0   CALCIUM 10.0 9.0 8.6     Recent Labs     06/09/21  1244   AST 21   ALT 18   BILITOT 1.2   ALKPHOS 91     No results for input(s): INR in the last 72 hours. No results for input(s): Ethelene Soulier in the last 72 hours. No results for input(s): PROCAL in the last 72 hours. Microbiology:      Urinalysis:      Lab Results   Component Value Date    NITRU NEGATIVE 06/09/2021    WBCUA 3-5 12/27/2017    BACTERIA 2+ 12/27/2017    RBCUA 0-2 12/27/2017    BLOODU NEGATIVE 06/09/2021    SPECGRAV 1.025 12/27/2017    GLUCOSEU NEGATIVE 06/09/2021       Radiology:  No results found.     DVT prophylaxis: [] Lovenox                                 [x] SCDs                                 [] SQ Heparin                                 [] Encourage ambulation           [] Already on Anticoagulation     Code Status: Full Code    Tele:   [x] yes              [] no    Active Hospital Problems    Diagnosis Date Noted    Bradycardia [R00.1] 06/10/2021    Anemia [D64.9]     GI bleed [K92.2] 06/09/2021       Electronically signed by Erin Betancourt DO on 6/11/2021 at 10:47 AM

## 2021-06-11 NOTE — PROGRESS NOTES
Gastroenterology Progress Note:     Patient Name:  Yamilet Pritchard   MRN: [id number]  [id number]  YOB: 1960  Admit Date: 6/9/2021 12:16 PM  Primary Care Physician: Sury Ty MD   [phone]     Patient seen and examined. 24 hours events and chart reviewed. Subjective: Patient resting in bed. She is much more alert and oriented today. Denies abdominal pain, nausea, & vomiting. No bowel movement overnight or today. Hgb 9.4 Per RN, she is going for a THERON and cardiac cath today. Objective:  BP (!) 122/56   Pulse 56   Temp 98.1 °F (36.7 °C) (Oral)   Resp 18   Wt 249 lb 8 oz (113.2 kg)   SpO2 98%   BMI 47.14 kg/m²     Physical Exam:    General:  Chronically ill appearing female  HEENT: Atraumatic, normocephalic. Moist oral mucous membranes. Neck: Supple without adenopathy, JVD, thyromegaly or masses. Trachea midline. CV: Heart RRR, no murmurs, rubs, gallops. Resp: Even, easy without cough or accessory use. Lungs clear to ascultation bilaterally. Abd: Round, soft, obese, nontender. No hepatosplenomegaly or mass present. Active bowel sounds heard. No distention noted. Ext:  Without cyanosis, clubbing, edema.    Skin: Pink, warm, dry  Neuro:  Alert, oriented x 3        Rectal: deferred    Labs:   CBC:   Lab Results   Component Value Date    WBC 9.7 06/10/2021    HGB 9.4 06/11/2021    HCT 31.9 06/11/2021    .3 06/10/2021     06/10/2021     BMP:   Lab Results   Component Value Date     06/11/2021    K 4.2 06/11/2021    K 3.6 06/10/2021     06/11/2021    CO2 24 06/11/2021    PHOS 3.1 12/27/2017    BUN 24 06/11/2021    CREATININE 1.0 06/11/2021    CALCIUM 8.6 06/11/2021     Lipids:   Lab Results   Component Value Date    ALKPHOS 91 06/09/2021    ALT 18 06/09/2021    AST 21 06/09/2021    BILITOT 1.2 06/09/2021    BILITOT 0.7 12/27/2017    BILIDIR 0.2 12/27/2017    LABALBU 3.9 06/09/2021     Current Meds:  Scheduled Meds:   gabapentin  300 mg Oral TID    aspirin 325 mg Oral Once    pantoprazole  40 mg Oral QAM AC    atorvastatin  40 mg Oral Daily    citalopram  20 mg Oral Daily    ferrous sulfate  325 mg Oral BID    folic acid  1 mg Oral Daily    isosorbide mononitrate  15 mg Oral Daily    levothyroxine  25 mcg Oral Daily    nicotine  1 patch Transdermal Q24H    traZODone  100 mg Oral Nightly    vitamin B-12  1,000 mcg Oral Daily    memantine  10 mg Oral BID    insulin lispro  0-6 Units Subcutaneous TID WC    insulin lispro  0-3 Units Subcutaneous Nightly    sodium chloride flush  5-40 mL Intravenous 2 times per day     Continuous Infusions:   sodium chloride      sodium chloride      dextrose         Assessment:  58 yo F admitted 06/09/21 as a transfer from [facility] where she presented for AMS & fatigue. Her roommate called the squad due to the above symptoms. She was noted to have hallucinations. Noted to be anemic, initial Hgb 7.5, PRBC given. H/O chronic anemia & IGNACIA, follows with Dr. Maksim Moya. Very little history could be obtained due to the patient's AMS and no family being present. 1. AMS   2. Bradycardia  3. Acute on chronic anemia- s/p 2 units PRBC- no GI bleeding noted  4. H/O IGNACIA- on PO iron  5. Morbid obesity  6. Anxiety  7. Elevated troponin      Plan:    · Monitor H & H, transfuse prn  · Nursing to monitor stool output & document  · Change PPI to PO daily  · NPO for procedure  · THERON & cardiac cath today per cardiology  · EGD inpatient vs outpatient  · Cardiology on board  · Supportive care per primary team  Will follow    Case reviewed and impression/plan reviewed in collaboration with Dr. James Taylor  Electronically signed by SHERMAN Fink CNP on 6/11/2021 at 1:34 PM    [facility]     If there are any questions or concerns this weekend, please call Dr. Ronnie Black as he is covering for [facility].

## 2021-06-11 NOTE — PLAN OF CARE
Problem: Falls - Risk of:  Goal: Will remain free from falls  Description: Will remain free from falls  Outcome: Ongoing  Note: Patient remained free from falls this shift. Bed is in low position with alarm on and siderails up x2. Education given on use of call light and patient voiced understanding. Call light and beside table within reach. Arm band and falling star in place. Hourly rounds completed. Will continue to monitor. Problem: Skin Integrity:  Goal: Absence of new skin breakdown  Description: Absence of new skin breakdown  Outcome: Ongoing  Note: No signs of new skin breakdown noted with each assessment this shift. Skin warm, dry, and intact except where otherwise noted in head-to-toe assessment. Mucous membranes pink and moist. Assistance with turns/ambulation given as needed. Problem: Daily Care:  Goal: Daily care needs are met  Description: Daily care needs are met  Outcome: Ongoing  Note: Daily care needs are being met at this time. Problem: Pain:  Goal: Patient's pain/discomfort is manageable  Description: Patient's pain/discomfort is manageable  Outcome: Ongoing  Note: Patient complained of no pain rating it a 0 on the 0-10 scale. Pain medication given as ordered and needed. Patient voiced satisfaction with this. Also instructed patient on distraction, repositioning, and ambulation as non-pharmacological methods of pain relief. Patient voiced understanding. Problem: Discharge Planning:  Goal: Patients continuum of care needs are met  Description: Patients continuum of care needs are met  Outcome: Ongoing  Note: Discharge planning in process and discussed with patient/family. Social work consulted for any additional needs. Care manager aware of discharge needs. Plan of care discussed with pt and family. Pt verbalizes understand.

## 2021-06-11 NOTE — BRIEF OP NOTE
[address] [address]  Sedation/Analgesia Post Sedation Record    Pt Name: Harvey Garcia  Account number: [de-identified]  MRN: [id number]  YOB: 1960  Procedure Performed By: MD MD Qamar Shelton, [address]  Primary Care Physician: Leslie العلي MD  Date: 6/11/2021    POST-PROCEDURE    Physicians/Assistants: MD MD Qamar Shelton, RPVI    Procedure Performed:Cath      Sedation/Anesthesia: Versed/ Fentanyl and 2% xylocaine local anesthesia. Estimated Blood Loss: < 50 ml. Specimens Removed: None         Disposition of Specimen: N/A        Complications: No Immediate Complications.        Post-procedure Diagnosis/Findings:     No sig CAD  Severe volume overload with secondary PH  LVEDP 35-40  PA pressures 60-70  Severe MR    MitraClip work-up as outpatient               MD MD Qamar Shelton, [address]  Electronically signed 6/11/2021 at 4:59 PM  Interventional Cardiology

## 2021-06-11 NOTE — PRE SEDATION
[address] [address]  Sedation/Analgesia History & Physical    Pt Name: Huong Og  Account number: [de-identified]  MRN: [id number]  YOB: 1960  Provider Performing Procedure: Guilherme Davenport MD MD  Referring Provider: Pedro Davila DO   Primary Care Physician: Chon Maxwell MD  Date: 6/11/2021    PRE-PROCEDURE    Code Status: FULL CODE  Brief History/Pre-Procedure Diagnosis:   NSTEMI   CHF    Consent: : I have discussed with the patient risks, benefits, and alternatives (and relevant risks, benefits, and side effects related to alternatives or not receiving care), and likelihood of the success. The patient and/or representative appear to understand and agree to proceed. The discussion encompasses risks, benefits, and side effects related to the alternatives and the risks related to not receiving the proposed care, treatment, and services. The indication, risks and benefits of the procedure and possible therapeutic consequences and alternatives were discussed with the patient. The patient was given the opportunity to ask questions and to have them answered to his/her satisfaction. Risks of the procedure include but are not limited to the following: Bleeding, hematoma including retroperitoneal hemmorhage, infection, pain and discomfort, injury to the aorta and other blood vessels, rhythm disturbance, low blood pressure, myocardial infarction, stroke, kidney damage/failure, myocardial perforation, allergic reactions to sedatives/contrast material, loss of pulse/vascular compromise requiring surgery, aneurysm/pseudoaneurysm formation, possible loss of a limb/hand/leg, needing blood transfusion, requiring emergent open heart surgery or emergent coronary intervention, the need for intubation/respiratory support, the requirement for defibrillation/cardioversion, and death. Alternatives to and omission of the suggested procedure were discussed.  The patient had no further questions and wished CNP    atorvastatin (LIPITOR) tablet 40 mg, 40 mg, Oral, Daily, Brady Justin, DO, 40 mg at 06/11/21 1050    citalopram (CELEXA) tablet 20 mg, 20 mg, Oral, Daily, [place] [facility], DO, 20 mg at 06/11/21 1050    ferrous sulfate (IRON 325) tablet 325 mg, 325 mg, Oral, BID, Brady Justin, DO, 325 mg at 66/56/56 7161    folic acid (FOLVITE) tablet 1 mg, 1 mg, Oral, Daily, [place] [facility], DO, 1 mg at 06/11/21 1050    isosorbide mononitrate (IMDUR) extended release tablet 15 mg, 15 mg, Oral, Daily, [place] [facility], DO, 15 mg at 06/11/21 1050    levothyroxine (SYNTHROID) tablet 25 mcg, 25 mcg, Oral, Daily, [place] [facility], DO, 25 mcg at 06/11/21 0615    nicotine (NICODERM CQ) 14 MG/24HR 1 patch, 1 patch, Transdermal, Q24H, Brady Justin, DO, 1 patch at 06/10/21 1242    traZODone (DESYREL) tablet 100 mg, 100 mg, Oral, Nightly, [place] [facility], DO, 100 mg at 06/10/21 2104    vitamin B-12 (CYANOCOBALAMIN) tablet 1,000 mcg, 1,000 mcg, Oral, Daily, [place] [facility] [facility], DO, 1,000 mcg at 06/11/21 1050    memantine (NAMENDA) tablet 10 mg, 10 mg, Oral, BID, Brady Justin, DO, 10 mg at 06/11/21 1050    insulin lispro (HUMALOG) injection vial 0-6 Units, 0-6 Units, Subcutaneous, TID WC, Brady Justin,     insulin lispro (HUMALOG) injection vial 0-3 Units, 0-3 Units, Subcutaneous, Nightly, Brady Justin,     sodium chloride flush 0.9 % injection 5-40 mL, 5-40 mL, Intravenous, 2 times per day, Sharlene Boyd MD, 10 mL at 06/11/21 1049    sodium chloride flush 0.9 % injection 5-40 mL, 5-40 mL, Intravenous, PRN, Sharlene Boyd MD    0.9 % sodium chloride infusion, 25 mL, Intravenous, PRN, Sharlene Boyd MD    ondansetron (ZOFRAN-ODT) disintegrating tablet 4 mg, 4 mg, Oral, Q8H PRN **OR** ondansetron (ZOFRAN) injection 4 mg, 4 mg, Intravenous, Q6H PRN, Sharlene Boyd MD    polyethylene glycol (GLYCOLAX) packet 17 g, 17 g, Oral, Daily PRN, Sharlene Boyd MD    acetaminophen (TYLENOL) tablet 650 mg, 650 mg, Oral, Q6H PRN **OR** acetaminophen (TYLENOL) suppository 650 mg, 650 mg, Rectal, Q6H PRN, Brando Hendrickson MD    glucose (GLUTOSE) 40 % oral gel 15 g, 15 g, Oral, PRN, Brando Hendrickson MD    dextrose 50 % IV solution, 12.5 g, Intravenous, PRN, Brando Hendrickson MD    glucagon (rDNA) injection 1 mg, 1 mg, Intramuscular, PRN, Brando Hendrickson MD    dextrose 5 % solution, 100 mL/hr, Intravenous, PRN, Brando Hendrickson MD  Prior to Admission medications    Medication Sig Start Date End Date Taking? Authorizing Provider   metoprolol succinate (TOPROL XL) 25 MG extended release tablet Take 12.5 mg by mouth daily   Yes Historical Provider, MD   nystatin (MYCOSTATIN) 600312 UNIT/GM powder Apply topically 2 times daily Apply topically 2 times daily. Yes Historical Provider, MD   nicotine (NICODERM CQ) 14 MG/24HR Place 1 patch onto the skin every 24 hours   Yes Historical Provider, MD   linaclotide (LINZESS) 145 MCG capsule Take 145 mcg by mouth every morning (before breakfast)   Yes Historical Provider, MD   folic acid (FOLVITE) 1 MG tablet Take 1 mg by mouth daily   Yes Historical Provider, MD   vitamin B-12 (CYANOCOBALAMIN) 1000 MCG tablet Take 1,000 mcg by mouth daily   Yes Historical Provider, MD   ferrous sulfate (IRON 325) 325 (65 Fe) MG tablet Take 325 mg by mouth 2 times daily   Yes Historical Provider, MD   Exenatide ER (BYDUREON BCISE) 2 MG/0.85ML AUIJ Inject 0.85 mLs into the skin once a week   Yes Historical Provider, MD   traZODone (DESYREL) 100 MG tablet Take 100 mg by mouth nightly   Yes Historical Provider, MD   gabapentin (NEURONTIN) 400 MG capsule Take 800 mg by mouth 3 times daily.    Yes Historical Provider, MD   citalopram (CELEXA) 20 MG tablet Take 20 mg by mouth daily   Yes Historical Provider, MD   isosorbide mononitrate (IMDUR) 30 MG extended release tablet Take 15 mg by mouth daily   Yes Historical Provider, MD   buPROPion (WELLBUTRIN XL) 300 MG extended release tablet Take 300 mg by mouth every morning   Yes Historical Provider, MD   memantine ER (NAMENDA XR) 28 MG patient is an appropriate candidate to undergo the planned procedure sedation and anesthesia. (Refer to nursing sedation/analgesia documentation record)  [x]Formulation and discussion of sedation/procedure plan, risks, and expectations with patient and/or responsible adult completed. [x]Patient examined immediately prior to the procedure.  (Refer to nursing sedation/analgesia documentation record)    Lawrence Jones MD MD   Electronically signed 6/11/2021 at 3:29 PM

## 2021-06-11 NOTE — CARE COORDINATION
6/11/21, 1:46 PM EDT    DISCHARGE ON GOING [address] day: 2  Location: -19/019-A Reason for admit: GI bleed [K92.2]   Procedure:   6/11 THERON  6/11 Cardiac Cath  Barriers to Discharge: GI following, diabetes management, Hgb 9.4, troponin 0.099, telemetry, Cardiology following, IV fluids, diabetes management. PCP: Logan Tafoya MD  Readmission Risk Score: 12%  Patient Goals/Plan/Treatment Preferences: From home with live in care giver. Plan is to return at discharge. Will follow for needs.

## 2021-06-11 NOTE — PROGRESS NOTES
Cardiology Progress Note      Patient:  Tracy Cullen  YOB: 1960  MRN: [id number]   Acct: [de-identified]  Admit Date:  6/9/2021  Primary Cardiologist: dr Denzil Severance    Note per dr Stacey Jade Mealing for Consultation:  Bradycardia, GI bleeding        History Of Present Illness:    57 y.o. pleasant female c hx of CAD s/p PCI in 2/20,  DM , who was transferred from [facility].  She was taken by her room mate to [facility] due to feeling not well and tiredness. She has a hx of Anemia, Asthma, COPD, Dementia, Depression, DM, HTN, PVD and Stage 3 CKD. She was apparently scheduled for colonoscopy next week to see where she is loosing blood. H/H 9.8/32, in the past it used to 14/43 in 2017. Patient at present denies any chest pain, sob, palpitations. EKG shows junctional rhythm at HR 46 bpm, NSST. Troponins were mildly at 0.115. Not on any AV juwan blocking agents. Patient is a poor historian and most of the info was obtained from outside records. \"    Subjective (Events in last 24 hours):  Pt awake and alert. NAD. No cp or sob. No edema.   No complaints today  On 2 l/min O2    Objective:   /62   Pulse 57   Temp 98.2 °F (36.8 °C) (Oral)   Resp 20   Wt 249 lb 8 oz (113.2 kg)   SpO2 98%   BMI 47.14 kg/m²        TELEMETRY: junctional 48-50s, as low as 38 during night    Physical Exam:  General Appearance: alert and oriented to person, place and time, in no acute distress  Cardiovascular: normal rate, regular rhythm, normal S1 and S2, no murmurs, rubs, clicks, or gallops, distal pulses intact, no carotid bruits, no JVD  Pulmonary/Chest: clear to auscultation bilaterally- no wheezes, rales or rhonchi, normal air movement, no respiratory distress  Abdomen: soft, non-tender, non-distended, normal bowel sounds, no masses Extremities: no cyanosis, clubbing or edema, pulse   Skin: warm and dry, no rash or erythema  Head: normocephalic and atraumatic  Eyes: pupils equal, round, and reactive to light  Neck: supple and non-tender without mass, no thyromegaly   Neurological: alert, oriented, normal speech, no focal findings or movement disorder noted    Medications:    gabapentin  300 mg Oral TID    pantoprazole  40 mg Intravenous BID    atorvastatin  40 mg Oral Daily    citalopram  20 mg Oral Daily    ferrous sulfate  325 mg Oral BID    folic acid  1 mg Oral Daily    isosorbide mononitrate  15 mg Oral Daily    levothyroxine  25 mcg Oral Daily    nicotine  1 patch Transdermal Q24H    traZODone  100 mg Oral Nightly    vitamin B-12  1,000 mcg Oral Daily    memantine  10 mg Oral BID    insulin lispro  0-6 Units Subcutaneous TID WC    insulin lispro  0-3 Units Subcutaneous Nightly    sodium chloride flush  5-40 mL Intravenous 2 times per day      sodium chloride      sodium chloride 100 mL/hr at 06/11/21 0832    dextrose       sodium chloride flush, 5-40 mL, PRN  sodium chloride, 25 mL, PRN  ondansetron, 4 mg, Q8H PRN   Or  ondansetron, 4 mg, Q6H PRN  polyethylene glycol, 17 g, Daily PRN  acetaminophen, 650 mg, Q6H PRN   Or  acetaminophen, 650 mg, Q6H PRN  glucose, 15 g, PRN  dextrose, 12.5 g, PRN  glucagon (rDNA), 1 mg, PRN  dextrose, 100 mL/hr, PRN        Lab Data:  TTE 6/10/21  Summary   Technically difficult examination. Left ventricular size and systolic function is normal. Ejection fraction   was estimated at 55-60%. LV wall thickness is within normal limits. The right ventricular size appears normal with normal systolic function   and wall thickness. Right ventricular systolic pressure of 87-49 mm Hg consistent with mild   pulmonary hypertension. Mild aortic regurgitation is noted. The mitral valve was not well visualized. Possible Flail leaflet. Moderate mitral regurgitation is present. Consider THERON to further evaluate   Mitral valve if clinically indicated.       Signature      ----------------------------------------------------------------   Electronically signed by

## 2021-06-12 LAB
ANION GAP SERPL CALCULATED.3IONS-SCNC: 9 MEQ/L (ref 8–16)
BUN BLDV-MCNC: 20 MG/DL (ref 7–22)
CALCIUM SERPL-MCNC: 8.3 MG/DL (ref 8.5–10.5)
CHLORIDE BLD-SCNC: 107 MEQ/L (ref 98–111)
CO2: 24 MEQ/L (ref 23–33)
CREAT SERPL-MCNC: 1.2 MG/DL (ref 0.4–1.2)
GFR SERPL CREATININE-BSD FRML MDRD: 45 ML/MIN/1.73M2
GLUCOSE BLD-MCNC: 111 MG/DL (ref 70–108)
GLUCOSE BLD-MCNC: 131 MG/DL (ref 70–108)
GLUCOSE BLD-MCNC: 154 MG/DL (ref 70–108)
GLUCOSE BLD-MCNC: 285 MG/DL (ref 70–108)
GLUCOSE BLD-MCNC: 317 MG/DL (ref 70–108)
HCT VFR BLD CALC: 32.3 % (ref 37–47)
HEMOGLOBIN: 9.5 GM/DL (ref 12–16)
MAGNESIUM: 1.8 MG/DL (ref 1.6–2.4)
POTASSIUM REFLEX MAGNESIUM: 3.6 MEQ/L (ref 3.5–5.2)
SODIUM BLD-SCNC: 140 MEQ/L (ref 135–145)

## 2021-06-12 PROCEDURE — 82948 REAGENT STRIP/BLOOD GLUCOSE: CPT

## 2021-06-12 PROCEDURE — 99232 SBSQ HOSP IP/OBS MODERATE 35: CPT | Performed by: INTERNAL MEDICINE

## 2021-06-12 PROCEDURE — 6360000002 HC RX W HCPCS: Performed by: STUDENT IN AN ORGANIZED HEALTH CARE EDUCATION/TRAINING PROGRAM

## 2021-06-12 PROCEDURE — 80048 BASIC METABOLIC PNL TOTAL CA: CPT

## 2021-06-12 PROCEDURE — 6370000000 HC RX 637 (ALT 250 FOR IP): Performed by: HOSPITALIST

## 2021-06-12 PROCEDURE — 85014 HEMATOCRIT: CPT

## 2021-06-12 PROCEDURE — 99232 SBSQ HOSP IP/OBS MODERATE 35: CPT | Performed by: STUDENT IN AN ORGANIZED HEALTH CARE EDUCATION/TRAINING PROGRAM

## 2021-06-12 PROCEDURE — 83735 ASSAY OF MAGNESIUM: CPT

## 2021-06-12 PROCEDURE — 6370000000 HC RX 637 (ALT 250 FOR IP): Performed by: NURSE PRACTITIONER

## 2021-06-12 PROCEDURE — 6370000000 HC RX 637 (ALT 250 FOR IP): Performed by: INTERNAL MEDICINE

## 2021-06-12 PROCEDURE — 2140000000 HC CCU INTERMEDIATE R&B

## 2021-06-12 PROCEDURE — 2580000003 HC RX 258: Performed by: STUDENT IN AN ORGANIZED HEALTH CARE EDUCATION/TRAINING PROGRAM

## 2021-06-12 PROCEDURE — 36415 COLL VENOUS BLD VENIPUNCTURE: CPT

## 2021-06-12 PROCEDURE — 85018 HEMOGLOBIN: CPT

## 2021-06-12 RX ORDER — PANTOPRAZOLE SODIUM 40 MG/1
40 TABLET, DELAYED RELEASE ORAL
Qty: 30 TABLET | Refills: 2 | Status: SHIPPED | OUTPATIENT
Start: 2021-06-13

## 2021-06-12 RX ORDER — MAGNESIUM SULFATE HEPTAHYDRATE 40 MG/ML
2000 INJECTION, SOLUTION INTRAVENOUS PRN
Status: DISCONTINUED | OUTPATIENT
Start: 2021-06-12 | End: 2021-06-16 | Stop reason: HOSPADM

## 2021-06-12 RX ORDER — POTASSIUM CHLORIDE 20 MEQ/1
40 TABLET, EXTENDED RELEASE ORAL ONCE
Status: COMPLETED | OUTPATIENT
Start: 2021-06-12 | End: 2021-06-12

## 2021-06-12 RX ORDER — POTASSIUM CHLORIDE 20 MEQ/1
40 TABLET, EXTENDED RELEASE ORAL PRN
Status: DISCONTINUED | OUTPATIENT
Start: 2021-06-12 | End: 2021-06-16 | Stop reason: HOSPADM

## 2021-06-12 RX ORDER — FUROSEMIDE 10 MG/ML
20 INJECTION INTRAMUSCULAR; INTRAVENOUS 2 TIMES DAILY
Status: DISCONTINUED | OUTPATIENT
Start: 2021-06-12 | End: 2021-06-13

## 2021-06-12 RX ORDER — POTASSIUM CHLORIDE 7.45 MG/ML
10 INJECTION INTRAVENOUS PRN
Status: DISCONTINUED | OUTPATIENT
Start: 2021-06-12 | End: 2021-06-16 | Stop reason: HOSPADM

## 2021-06-12 RX ORDER — POTASSIUM CHLORIDE 20 MEQ/1
20 TABLET, EXTENDED RELEASE ORAL PRN
Status: DISCONTINUED | OUTPATIENT
Start: 2021-06-12 | End: 2021-06-16 | Stop reason: HOSPADM

## 2021-06-12 RX ADMIN — PANTOPRAZOLE SODIUM 40 MG: 40 TABLET, DELAYED RELEASE ORAL at 06:33

## 2021-06-12 RX ADMIN — FERROUS SULFATE TAB 325 MG (65 MG ELEMENTAL FE) 325 MG: 325 (65 FE) TAB at 08:55

## 2021-06-12 RX ADMIN — CITALOPRAM 20 MG: 20 TABLET, FILM COATED ORAL at 08:55

## 2021-06-12 RX ADMIN — LEVOTHYROXINE SODIUM 25 MCG: 0.03 TABLET ORAL at 06:33

## 2021-06-12 RX ADMIN — FERROUS SULFATE TAB 325 MG (65 MG ELEMENTAL FE) 325 MG: 325 (65 FE) TAB at 21:00

## 2021-06-12 RX ADMIN — GABAPENTIN 300 MG: 300 CAPSULE ORAL at 08:55

## 2021-06-12 RX ADMIN — ATORVASTATIN CALCIUM 40 MG: 40 TABLET, FILM COATED ORAL at 08:55

## 2021-06-12 RX ADMIN — FOLIC ACID 1 MG: 1 TABLET ORAL at 08:55

## 2021-06-12 RX ADMIN — MEMANTINE HYDROCHLORIDE 10 MG: 10 TABLET, FILM COATED ORAL at 08:54

## 2021-06-12 RX ADMIN — GABAPENTIN 300 MG: 300 CAPSULE ORAL at 12:22

## 2021-06-12 RX ADMIN — POTASSIUM CHLORIDE 40 MEQ: 1500 TABLET, EXTENDED RELEASE ORAL at 17:33

## 2021-06-12 RX ADMIN — ISOSORBIDE MONONITRATE 15 MG: 30 TABLET ORAL at 08:55

## 2021-06-12 RX ADMIN — TRAZODONE HYDROCHLORIDE 100 MG: 100 TABLET ORAL at 21:00

## 2021-06-12 RX ADMIN — FUROSEMIDE 20 MG: 10 INJECTION, SOLUTION INTRAMUSCULAR; INTRAVENOUS at 17:32

## 2021-06-12 RX ADMIN — Medication 1000 MCG: at 08:54

## 2021-06-12 RX ADMIN — GABAPENTIN 300 MG: 300 CAPSULE ORAL at 21:00

## 2021-06-12 RX ADMIN — SODIUM CHLORIDE, PRESERVATIVE FREE 10 ML: 5 INJECTION INTRAVENOUS at 21:01

## 2021-06-12 RX ADMIN — MEMANTINE HYDROCHLORIDE 10 MG: 10 TABLET, FILM COATED ORAL at 21:00

## 2021-06-12 NOTE — PROGRESS NOTES
Spoke with patient's caregiver Omi Brown was able to give this RN the hipaa code. Update given on patient's current status.

## 2021-06-12 NOTE — PROGRESS NOTES
Cardiology Progress Note      Patient:  Harvey Garcia  YOB: 1960  MRN: [id number]   Acct: [de-identified]  Admit Date:  6/9/2021  Primary Cardiologist: Mathew Davila     Note per dr Darryn Chin for Consultation:  Bradycardia, GI bleeding        History Of Present Illness:    57 y. o. pleasant female c hx of CAD s/p PCI in 2/20,  DM , who was transferred from [facility]. Susan Thomas was taken by her room mate to [facility] due to feeling not well and tiredness. Susan Thomas has a hx of Anemia, Asthma, COPD, Dementia, Depression, DM, HTN, PVD and Stage 3 CKD.  She was apparently scheduled for colonoscopy next week to see where she is loosing blood.   H/H 9.8/32, in the past it used to 14/43 in 2017.  Patient at present denies any chest pain, sob, palpitations.  EKG shows junctional rhythm at HR 46 bpm, NSST.  Troponins were mildly at 0.115. Not on any AV juwan blocking agents. Patient is a poor historian and most of the info was obtained from outside records. \"    Subjective (Events in last 24 hours):   Pt awake, alert. NAD. States she is feeling okay. D/w patient about fluid overload on her heart and that we will start lasix IV if her BMP is WNL, specifically creat. States she has taken lasix before and has noticed leg swelling for a while.      Objective:   BP (!) 119/49   Pulse 59   Temp 98.7 °F (37.1 °C) (Axillary)   Resp 16   Wt 255 lb 9.6 oz (115.9 kg)   SpO2 90%   BMI 48.30 kg/m²      vss  TELEMETRY: appears bigeminy, regular with no p waves     Physical Exam:  General Appearance: alert and oriented to person, place and time, in no acute distress  Cardiovascular: normal rate, regular rhythm, normal S1 and S2, no murmurs, rubs, clicks, or gallops, distal pulses intact, no carotid bruits, no JVD  Pulmonary/Chest: clear to auscultation bilaterally- no wheezes, rales or rhonchi, normal air movement, no respiratory distress  Abdomen: soft, non-tender, non-distended, normal bowel sounds, no masses   Extremities: no cyanosis, clubbing, 3-4+ LE edema, + DP pulse   Skin: warm and dry, no rash or erythema  Head: normocephalic and atraumatic  Eyes: pupils equal, round, and reactive to light  Neck: supple and non-tender without mass, no thyromegaly   Musculoskeletal: normal range of motion, no joint swelling, deformity or tenderness  Neurological: alert, oriented, normal speech, no focal findings or movement disorder noted    Medications:    furosemide  20 mg Intravenous BID    gabapentin  300 mg Oral TID    pantoprazole  40 mg Oral QAM AC    atorvastatin  40 mg Oral Daily    citalopram  20 mg Oral Daily    ferrous sulfate  325 mg Oral BID    folic acid  1 mg Oral Daily    isosorbide mononitrate  15 mg Oral Daily    levothyroxine  25 mcg Oral Daily    nicotine  1 patch Transdermal Q24H    traZODone  100 mg Oral Nightly    vitamin B-12  1,000 mcg Oral Daily    memantine  10 mg Oral BID    insulin lispro  0-6 Units Subcutaneous TID WC    insulin lispro  0-3 Units Subcutaneous Nightly    sodium chloride flush  5-40 mL Intravenous 2 times per day      sodium chloride      dextrose       sodium chloride flush, 5-40 mL, PRN  sodium chloride, 25 mL, PRN  ondansetron, 4 mg, Q8H PRN   Or  ondansetron, 4 mg, Q6H PRN  polyethylene glycol, 17 g, Daily PRN  acetaminophen, 650 mg, Q6H PRN   Or  acetaminophen, 650 mg, Q6H PRN  glucose, 15 g, PRN  dextrose, 12.5 g, PRN  glucagon (rDNA), 1 mg, PRN  dextrose, 100 mL/hr, PRN        Diagnostics:  EKG:    Junctional rhythm  ST & T wave abnormality, consider inferior ischemia  ST & T wave abnormality, consider anterolateral ischemia  Abnormal ECG  When compared with ECG of 10-MOISE-2021 12:08,  Inverted T waves have replaced nonspecific T wave abnormality in Inferior leads  Confirmed by Elissa Cosme ([id number]) on 6/11/2021 7:08:15 PM  Echo:   Conclusions      Summary   Probe easily inserted by Cardiologist.   Procedure performed without complications.    The study included complete 2D imaging, complete spectral doppler, and   color doppler. The transesophageal approach was used. Risk benefits and alternatives were explained to the patient and informed   consent was obtained. Ejection fraction was estimated at 50-55%. Left atrial size was severely dilated with no thrombus identified. APPENDAGE: The left atrial appendage size was normal with no thrombus   identified. DOPPLER: The function was normal (normal emptying velocity). ATRIAL SEPTUM: Small PFO identified by color and bubble study. The mitral valve structure was degenerated with restriction of the   posterior leaflet. DOPPLER: The transmitral velocity was within the normal   range with no evidence for mitral stenosis. There was severe mitral   regurgitation (mean gradient 2 mmHg). Mild tricuspid regurgitation. Signature      ----------------------------------------------------------------   Electronically signed by Pat Fisher MD (Interpreting   physician) on 06/11/2021 at 05:28 PM  Electronically signed by Akbar Coronel MD (Interpreting   physician) on 06/10/2021 at 04:34 PM   ----------------------------------------------------------------      Findings      Mitral Valve   The mitral valve was not well visualized. Possible Flail leaflet. Moderate mitral regurgitation is present. Consider THERON to further evaluate   Mitral valve if clinically indicated. Aortic Valve   The aortic valve appears trileaflet with normal thickness and leaflet   excursion. DOPPLER: Transaortic velocity was within the normal range with   no evidence of aortic stenosis. Mild aortic regurgitation is noted. Tricuspid Valve   The tricuspid valve structure is normal with normal leaflet separation. DOPPLER: There is no evidence of tricuspid stenosis. Mild tricuspid regurgitation visualized. Pulmonic Valve   The pulmonic valve was not well visualized . Left Atrium   Moderately dilated left atrium.       Left Ventricle Left ventricular size and systolic function is normal. Ejection fraction   was estimated at 55-60%. LV wall thickness is within normal limits. Right Atrium   Mildly enlarged right atrium size. Right Ventricle   The right ventricular size appears normal with normal systolic function   and wall thickness. Right ventricular systolic pressure of 58-17 mm Hg consistent with mild   pulmonary hypertension. Pericardial Effusion   The pericardium appears normal with no evidence of a pericardial effusion. Pleural Effusion   No evidence of pleural effusion. Aorta / Great Vessels   -Aortic root dimension within normal limits. -IVC size is within normal limits with normal respiratory phasic changes. Stress:     Left Heart Cath:   Post-procedure Diagnosis/Findings:           No sig CAD  Severe volume overload with secondary PH  LVEDP 35-40  PA pressures 60-70  Severe MR     MitraClip work-up as outpatient                                                                                          MD MD Felipe Snider, [address]  Electronically signed 6/11/2021 at 4:59 PM  Interventional Cardiology  Lab Data:    Cardiac Enzymes:  No results for input(s): CKTOTAL, CKMB, CKMBINDEX, TROPONINI in the last 72 hours.     CBC:   Lab Results   Component Value Date    WBC 9.7 06/10/2021    RBC 3.04 06/10/2021    RBC 4.52 12/27/2017    HGB 9.5 06/12/2021    HCT 32.3 06/12/2021     06/10/2021       CMP:    Lab Results   Component Value Date     06/11/2021    K 4.2 06/11/2021    K 3.6 06/10/2021     06/11/2021    CO2 24 06/11/2021    BUN 24 06/11/2021    CREATININE 1.0 06/11/2021    AGRATIO 1.1 12/27/2017    LABGLOM 56 06/11/2021    GLUCOSE 94 06/11/2021    GLUCOSE 107 12/27/2017    CALCIUM 8.6 06/11/2021       Hepatic Function Panel:    Lab Results   Component Value Date    ALKPHOS 91 06/09/2021    ALT 18 06/09/2021    AST 21 06/09/2021    PROT 6.7 06/09/2021    BILITOT 1.2 06/09/2021

## 2021-06-12 NOTE — PROGRESS NOTES
Hospitalist Progress Note    Patient:  Vicki Conner      Unit/Bed:3B-24/024-A    YOB: 1960    MRN: [id number]       Acct: [de-identified]     PCP: Sherren Kindler, MD    Date of Admission: 6/9/2021    Assessment/Plan:    1. Acute on blood loss anemia-questionable GI bleed-follows up with hematologist oncologist as outpatient-seen by GI planning for outpatient colonoscopy/EGD-hemoglobin stable between 9-9.5  2. Elevated troponin likely secondary to demand ischemia-had cardiac cath on 6/11-ACS ruled out has nonobstructive coronary disease, has severe MR-plan for MitraClip as outpatient as per interventional cardiologist.  3. Volume overload-not in CHF-check BMP Will add couple doses of Lasix starting today 6/12-monitor intake and output and daily weights, BMP a.m. 4. Junctional rhythm and Bradycardia -EP cardiologist consulted- ? Pacer if needed after their evaluation. Currently off any rate controllers  5. ?  Metabolic encephalopathy however currently at baseline mentation  6. Chronic hypoxic respiratory failure-currently at baseline oxygen of 2 L      Chief Complaint: Anemia    Initial H&P and Hospital Course:     Patient is a poor historian. History obtained from patient and chart review. 57 y.o. female who presented to [address] [place] [address] with complaints of fatigue x 3-4 weeks. PMHx significant for GERD and anxiety as per chart review. Patient was take to [facility] by her roommate who reports that patient has not been herself for the past 3-4 weeks and is always feeling tired. As per patient, she states that she was to have a \"scope\" because her blood counts have been low. Patient is not able to elaborate on this - unsure who her GI doctor is and which procedure she was to have done. Patient states her PCP sent her to [facility] due to her \"blood being low. \" Patient denies any hemoptysis, hematochezia, or hematuria.  She denies any fevers, chills, chest pain, abdominal pain, N/V/D otherwise. She is always short of breath and chronically uses oxygen at home. Patient not able to tell me if she takes any blood thinners or NSAIDs.     Records from [facility] reviewed. Labs showed BMP with BUN/Cr 35/1.2 Ca 10.4. CBC showed H/H 7.5/22.5, MCV 99.9, WBC wnl. Troponin 0.25, 0.27. XR showed stable cardiomegaly, no evidence of CHF. Patient was given 2 units PRBCs at [facility] and transferred to [facility] for further evaluation. EKG en route via EMS showed junctional rhythm.        6/10/21: S/p 2 units of pRBC transfusion. Stable. EGD when stable. Subjective (past 24 hours): Patient was seen and examined at bedside. More awake and alert answering questions-understood what is going on denies any nausea vomiting diarrhea no gross bleeds      Medications:  Reviewed    Infusion Medications    sodium chloride      dextrose       Scheduled Medications    furosemide  20 mg Intravenous BID    gabapentin  300 mg Oral TID    pantoprazole  40 mg Oral QAM AC    atorvastatin  40 mg Oral Daily    citalopram  20 mg Oral Daily    ferrous sulfate  325 mg Oral BID    folic acid  1 mg Oral Daily    isosorbide mononitrate  15 mg Oral Daily    levothyroxine  25 mcg Oral Daily    nicotine  1 patch Transdermal Q24H    traZODone  100 mg Oral Nightly    vitamin B-12  1,000 mcg Oral Daily    memantine  10 mg Oral BID    insulin lispro  0-6 Units Subcutaneous TID WC    insulin lispro  0-3 Units Subcutaneous Nightly    sodium chloride flush  5-40 mL Intravenous 2 times per day     PRN Meds: sodium chloride flush, sodium chloride, ondansetron **OR** ondansetron, polyethylene glycol, acetaminophen **OR** acetaminophen, glucose, dextrose, glucagon (rDNA), dextrose      Intake/Output Summary (Last 24 hours) at 6/12/2021 1356  Last data filed at 6/12/2021 1319  Gross per 24 hour   Intake 1025.5 ml   Output 2000 ml   Net -974.5 ml       Diet:  ADULT DIET;  Regular; 5 carb choices (75 gm/meal)    Exam:  /63   Pulse 06/09/2021       Radiology:  No results found.     DVT prophylaxis: [] Lovenox                                 [x] SCDs                                 [] SQ Heparin                                 [] Encourage ambulation           [] Already on Anticoagulation     Code Status: Full Code    Tele:   [x] yes              [] no    Active Hospital Problems    Diagnosis Date Noted    Bradycardia [R00.1] 06/10/2021    Anemia [D64.9]     GI bleed [K92.2] 06/09/2021       Electronically signed by Ramona Cedeño MD on 6/12/2021 at 1:56 PM

## 2021-06-12 NOTE — PROGRESS NOTES
Pt Name: Demarco Weber  MRN: [id number]  [id number]  YOB: 1960  Admit Date: 6/9/2021 12:16 PM  Date of evaluation: 6/12/2021  Primary Care Physician: Doreen Canela MD   3B-24/024-A   Dictating for Dr. Morena Tavares denies abd pain, nausea, vomiting, melena, and hematochezia. She reports last scope EGD and colonoscopy was a year or 2 ago by Dr Paris White in [facility]. She states she has been on iron for 3 or 4 years. She gets dilated intermittently as well in past. She reports polyps in past.    O  /60   Pulse 63   Temp 98.4 °F (36.9 °C) (Axillary)   Resp 16   Wt 255 lb 9.6 oz (115.9 kg)   SpO2 97%   BMI 48.30 kg/m²   VSS, afebrile  Alert and oriented  Resp; CTAB  Chest: RRR  Abd; soft, non-tender, non-distended, active BS'S  Ext; mild edema. Cardiac cath 6-  Post-procedure Diagnosis/Findings:           No sig CAD  Severe volume overload with secondary PH  LVEDP 35-40  PA pressures 60-70  Severe MR  MitraClip work-up as outpatient                Results for Irina Serna (MRN [id number]) as of 6/12/2021 08:17   Ref. Range 6/9/2021 12:44   Ferritin Latest Ref Range: 10 - 291 ng/mL 97   Iron Latest Ref Range: 50 - 170 ug/dL 153   Iron Saturation Latest Ref Range: 20 - 50 % 49   TIBC Latest Ref Range: 171 - 450 ug/dL 315   Folate Latest Ref Range: 4.8 - 24.2 ng/mL > 20.0   Vitamin B-12 Latest Ref Range: 211 - 911 pg/mL > 2000 (H)           Assessment:  56 yo F admitted 06/09/21 as a transfer from [facility] where she presented for AMS & fatigue. Her roommate called the squad due to the above symptoms. She was noted to have hallucinations. Noted to be anemic, initial Hgb 7.5, PRBC given. H/O chronic anemia & IGNACIA, follows with Dr. Domenic Redman. Cardiac cath 6- no significant CAD,Severe volume overload with secondary PH, Severe MR. Mitraclip work up as outpatient.   · AMS , improving  · Bradycardia  · Acute on chronic anemia- hgb stable 9.5, s/p 2 units PRBC at OSH- no GI bleeding

## 2021-06-13 LAB
ANION GAP SERPL CALCULATED.3IONS-SCNC: 8 MEQ/L (ref 8–16)
BUN BLDV-MCNC: 20 MG/DL (ref 7–22)
CALCIUM SERPL-MCNC: 8.2 MG/DL (ref 8.5–10.5)
CHLORIDE BLD-SCNC: 107 MEQ/L (ref 98–111)
CO2: 26 MEQ/L (ref 23–33)
CREAT SERPL-MCNC: 1.4 MG/DL (ref 0.4–1.2)
GFR SERPL CREATININE-BSD FRML MDRD: 38 ML/MIN/1.73M2
GLUCOSE BLD-MCNC: 106 MG/DL (ref 70–108)
GLUCOSE BLD-MCNC: 125 MG/DL (ref 70–108)
GLUCOSE BLD-MCNC: 133 MG/DL (ref 70–108)
GLUCOSE BLD-MCNC: 145 MG/DL (ref 70–108)
GLUCOSE BLD-MCNC: 205 MG/DL (ref 70–108)
HCT VFR BLD CALC: 31.6 % (ref 37–47)
HEMOGLOBIN: 9 GM/DL (ref 12–16)
POTASSIUM REFLEX MAGNESIUM: 4 MEQ/L (ref 3.5–5.2)
SODIUM BLD-SCNC: 141 MEQ/L (ref 135–145)

## 2021-06-13 PROCEDURE — 85014 HEMATOCRIT: CPT

## 2021-06-13 PROCEDURE — 36415 COLL VENOUS BLD VENIPUNCTURE: CPT

## 2021-06-13 PROCEDURE — 2580000003 HC RX 258: Performed by: STUDENT IN AN ORGANIZED HEALTH CARE EDUCATION/TRAINING PROGRAM

## 2021-06-13 PROCEDURE — 6370000000 HC RX 637 (ALT 250 FOR IP): Performed by: HOSPITALIST

## 2021-06-13 PROCEDURE — 6370000000 HC RX 637 (ALT 250 FOR IP): Performed by: NURSE PRACTITIONER

## 2021-06-13 PROCEDURE — 82948 REAGENT STRIP/BLOOD GLUCOSE: CPT

## 2021-06-13 PROCEDURE — 99232 SBSQ HOSP IP/OBS MODERATE 35: CPT | Performed by: INTERNAL MEDICINE

## 2021-06-13 PROCEDURE — 85018 HEMOGLOBIN: CPT

## 2021-06-13 PROCEDURE — 99232 SBSQ HOSP IP/OBS MODERATE 35: CPT | Performed by: STUDENT IN AN ORGANIZED HEALTH CARE EDUCATION/TRAINING PROGRAM

## 2021-06-13 PROCEDURE — 99221 1ST HOSP IP/OBS SF/LOW 40: CPT | Performed by: INTERNAL MEDICINE

## 2021-06-13 PROCEDURE — 2140000000 HC CCU INTERMEDIATE R&B

## 2021-06-13 PROCEDURE — 80048 BASIC METABOLIC PNL TOTAL CA: CPT

## 2021-06-13 RX ORDER — FUROSEMIDE 20 MG/1
20 TABLET ORAL DAILY
Status: DISCONTINUED | OUTPATIENT
Start: 2021-06-13 | End: 2021-06-16 | Stop reason: HOSPADM

## 2021-06-13 RX ADMIN — GABAPENTIN 300 MG: 300 CAPSULE ORAL at 13:28

## 2021-06-13 RX ADMIN — PANTOPRAZOLE SODIUM 40 MG: 40 TABLET, DELAYED RELEASE ORAL at 05:59

## 2021-06-13 RX ADMIN — TRAZODONE HYDROCHLORIDE 100 MG: 100 TABLET ORAL at 22:36

## 2021-06-13 RX ADMIN — GABAPENTIN 300 MG: 300 CAPSULE ORAL at 09:56

## 2021-06-13 RX ADMIN — SODIUM CHLORIDE, PRESERVATIVE FREE 10 ML: 5 INJECTION INTRAVENOUS at 09:57

## 2021-06-13 RX ADMIN — Medication 1000 MCG: at 09:56

## 2021-06-13 RX ADMIN — ISOSORBIDE MONONITRATE 15 MG: 30 TABLET ORAL at 09:56

## 2021-06-13 RX ADMIN — MEMANTINE HYDROCHLORIDE 10 MG: 10 TABLET, FILM COATED ORAL at 20:03

## 2021-06-13 RX ADMIN — SODIUM CHLORIDE, PRESERVATIVE FREE 10 ML: 5 INJECTION INTRAVENOUS at 20:04

## 2021-06-13 RX ADMIN — LEVOTHYROXINE SODIUM 25 MCG: 0.03 TABLET ORAL at 05:59

## 2021-06-13 RX ADMIN — ATORVASTATIN CALCIUM 40 MG: 40 TABLET, FILM COATED ORAL at 20:03

## 2021-06-13 RX ADMIN — FOLIC ACID 1 MG: 1 TABLET ORAL at 09:56

## 2021-06-13 RX ADMIN — CITALOPRAM 20 MG: 20 TABLET, FILM COATED ORAL at 09:56

## 2021-06-13 RX ADMIN — FERROUS SULFATE TAB 325 MG (65 MG ELEMENTAL FE) 325 MG: 325 (65 FE) TAB at 09:56

## 2021-06-13 RX ADMIN — FERROUS SULFATE TAB 325 MG (65 MG ELEMENTAL FE) 325 MG: 325 (65 FE) TAB at 20:03

## 2021-06-13 RX ADMIN — GABAPENTIN 300 MG: 300 CAPSULE ORAL at 20:03

## 2021-06-13 RX ADMIN — MEMANTINE HYDROCHLORIDE 10 MG: 10 TABLET, FILM COATED ORAL at 09:56

## 2021-06-13 ASSESSMENT — PAIN SCALES - GENERAL: PAINLEVEL_OUTOF10: 0

## 2021-06-13 NOTE — PROCEDURES
[address] [id number]                            CARDIAC CATHETERIZATION    PATIENT NAME: Everardo Wesley                    :        1960  MED REC NO:   [id number]                           ROOM:       [id number]  ACCOUNT NO:   [de-identified]                           ADMIT DATE: 2021  PROVIDER:     Venancio Jones MD    DATE OF PROCEDURE:  2021    INDICATION FOR PROCEDURE:  Acute NYHA class III CHF with history of LAD  and circumflex stenting, worsening LV systolic failure, concern for  severe mitral regurgitation. DESCRIPTION OF PROCEDURE:  After informed consent was obtained from the  patient, she was brought to the cardiac catheterization laboratory, and  prepped in sterile fashion. Right radial artery was initially chosen,  but it was too small for access; therefore, elected to use right femoral  artery and vein. After infiltration of the right inguinal region with  2% lidocaine using micropuncture and modified Seldinger technique under  fluoroscopic guidance and ultrasound guidance, I was able to insert a  5-Malagasy sheath in the right femoral artery and 5-Malagasy sheath in the  right femoral vein. We then performed a diagnostic coronary  catheterization using 5-Malagasy Dawn balloon-tipped wedge catheter and  left heart catheterization using 5-Malagasy JL4, 5-Malagasy JR4 catheters. We performed left ventriculography using 5-Malagasy angled pigtail. RIGHT HEART CATHETERIZATION:  RA 26, RV 62/22, PA 67/30 with a mean of  41, wedge pressure 32 along with PA saturation of 64%. CORONARY ANGIOGRAM:  LEFT MAIN:  Mild luminal irregularities but patent without any  significant obstruction. LAD:  30% stenosis proximally and with a patent stent to the proximal  segment, distally has luminal irregularities with no significant  obstruction. Small diagonal branch without any significant obstruction.     LCX:  Patent in the proximal segment. No significant obstruction. Bifurcates into a small AV groove branch and a larger OM1 branch without  any significant obstruction. RCA:  Luminal irregularities throughout without any significant  obstruction. Bifurcates into a very small PLB and larger PDA. RCA is  dominant. LV:  LV systolic pressure is 586. No significant LV to AO systolic  gradient. LVEDP is 30. LVEF is 45 to 50%. Mild to moderate dilation. Aortic valve is tricuspid. No significant aneurysm or dissection of  the visualized portion of the aorta. There is 4+ mitral regurgitation  with reflux into the pulmonary veins. IMMEDIATE COMPLICATIONS:  None. MEDICATIONS:  See EMR. ACCESS:  6-Jamaican Angio-Seal was used for hemostasis. ESTIMATED BLOOD LOSS:  Less than 10 mL. SUMMARY:  Severe mitral regurgitation; no significant coronary artery  disease; elevated right heart cath pressures with preserved cardiac  output. PLAN:  1. Bedrest.  2.  Optimal medical therapy. 3.  Risk factor management. 4.  Routine access site care. 5.  Guideline-directed therapy for acute heart failure. 6.  Uptitrate diuretics. 8.  Holding mitral valve workup as outpatient. 9.  CT Surgery consultation. 10.  Follow up with myself in two to four weeks postprocedure. All the above was explained to the patient and the patient's family. They are agreeable and amenable to the above plan.         Marlin Ch MD    D: 06/13/2021 4:26:11       T: 06/13/2021 6:23:36     VALERIE_RASHID  Job#: [id number]     Doc#: [id number]    CC:

## 2021-06-13 NOTE — PROGRESS NOTES
Cardiology Progress Note      Patient:  Pilo Osborne  YOB: 1960  MRN: [id number]   Acct: [de-identified]  Admit Date:  6/9/2021  Primary Cardiologist: Chapis Valero     Note per dr Adan Guajardo for Consultation:  Bradycardia, GI bleeding        History Of Present Illness:    57 y. o. pleasant female c hx of CAD s/p PCI in 2/20,  DM , who was transferred from [facility]. Joe Peacock was taken by her room mate to [facility] due to feeling not well and tiredness. Joe Peacock has a hx of Anemia, Asthma, COPD, Dementia, Depression, DM, HTN, PVD and Stage 3 CKD.  She was apparently scheduled for colonoscopy next week to see where she is loosing blood.   H/H 9.8/32, in the past it used to 14/43 in 2017.  Patient at present denies any chest pain, sob, palpitations.  EKG shows junctional rhythm at HR 46 bpm, NSST.  Troponins were mildly at 0.115. Not on any AV juwan blocking agents. Patient is a poor historian and most of the info was obtained from outside records. \"    Subjective (Events in last 24 hours):   Pt awake, alert. NAD. States she is feeling okay. D/w patient that her kidney function has slightly worsened and that we will hold off on diuretics. She is feeling at her baseline breathing wise.      Objective:   BP (!) 115/57   Pulse 66   Temp 99.1 °F (37.3 °C) (Oral)   Resp 20   Wt 255 lb 9.6 oz (115.9 kg)   SpO2 92%   BMI 48.30 kg/m²      vss  TELEMETRY:junctional, 60s      Physical Exam:  General Appearance: alert and oriented to person, place and time, in no acute distress  Cardiovascular: normal rate, regular rhythm, normal S1 and S2, no murmurs, rubs, clicks, or gallops, distal pulses intact, no carotid bruits, no JVD  Pulmonary/Chest: clear to auscultation in upper lobes, slight crackles in bases  Abdomen: soft, non-tender, non-distended, normal bowel sounds, no masses   Extremities: no cyanosis, clubbing, 1-2+ LE edema, + DP pulse, ace wraps in place   Skin: warm and dry, no rash or erythema  Head: normocephalic and atraumatic  Eyes: pupils equal, round, and reactive to light  Neck: supple and non-tender without mass, no thyromegaly   Musculoskeletal: normal range of motion, no joint swelling, deformity or tenderness  Neurological: alert, oriented, normal speech, no focal findings or movement disorder noted    Medications:    furosemide  20 mg Oral Daily    gabapentin  300 mg Oral TID    pantoprazole  40 mg Oral QAM AC    atorvastatin  40 mg Oral Daily    citalopram  20 mg Oral Daily    ferrous sulfate  325 mg Oral BID    folic acid  1 mg Oral Daily    isosorbide mononitrate  15 mg Oral Daily    levothyroxine  25 mcg Oral Daily    nicotine  1 patch Transdermal Q24H    traZODone  100 mg Oral Nightly    vitamin B-12  1,000 mcg Oral Daily    memantine  10 mg Oral BID    insulin lispro  0-6 Units Subcutaneous TID WC    insulin lispro  0-3 Units Subcutaneous Nightly    sodium chloride flush  5-40 mL Intravenous 2 times per day      sodium chloride      dextrose       potassium chloride, 40 mEq, PRN   Or  potassium alternative oral replacement, 40 mEq, PRN   Or  potassium chloride, 10 mEq, PRN  magnesium sulfate, 2,000 mg, PRN  potassium chloride, 20 mEq, PRN  sodium chloride flush, 5-40 mL, PRN  sodium chloride, 25 mL, PRN  ondansetron, 4 mg, Q8H PRN   Or  ondansetron, 4 mg, Q6H PRN  polyethylene glycol, 17 g, Daily PRN  acetaminophen, 650 mg, Q6H PRN   Or  acetaminophen, 650 mg, Q6H PRN  glucose, 15 g, PRN  dextrose, 12.5 g, PRN  glucagon (rDNA), 1 mg, PRN  dextrose, 100 mL/hr, PRN        Diagnostics:  EKG:    Junctional rhythm  ST & T wave abnormality, consider inferior ischemia  ST & T wave abnormality, consider anterolateral ischemia  Abnormal ECG  When compared with ECG of 10-MOISE-2021 12:08,  Inverted T waves have replaced nonspecific T wave abnormality in Inferior leads  Confirmed by HERNÁN COLÓN ([id number]) on 6/11/2021 7:08:15 PM  Echo:   Conclusions      Summary   Probe easily inserted by Cardiologist.   Procedure performed without complications. The study included complete 2D imaging, complete spectral doppler, and   color doppler. The transesophageal approach was used. Risk benefits and alternatives were explained to the patient and informed   consent was obtained. Ejection fraction was estimated at 50-55%. Left atrial size was severely dilated with no thrombus identified. APPENDAGE: The left atrial appendage size was normal with no thrombus   identified. DOPPLER: The function was normal (normal emptying velocity). ATRIAL SEPTUM: Small PFO identified by color and bubble study. The mitral valve structure was degenerated with restriction of the   posterior leaflet. DOPPLER: The transmitral velocity was within the normal   range with no evidence for mitral stenosis. There was severe mitral   regurgitation (mean gradient 2 mmHg). Mild tricuspid regurgitation. Signature      ----------------------------------------------------------------   Electronically signed by Vitaly Walsh MD (Interpreting   physician) on 06/11/2021 at 05:28 PM  Electronically signed by Stefanie Murphy MD (Interpreting   physician) on 06/10/2021 at 04:34 PM   ----------------------------------------------------------------      Findings      Mitral Valve   The mitral valve was not well visualized. Possible Flail leaflet. Moderate mitral regurgitation is present. Consider THERON to further evaluate   Mitral valve if clinically indicated. Aortic Valve   The aortic valve appears trileaflet with normal thickness and leaflet   excursion. DOPPLER: Transaortic velocity was within the normal range with   no evidence of aortic stenosis. Mild aortic regurgitation is noted. Tricuspid Valve   The tricuspid valve structure is normal with normal leaflet separation. DOPPLER: There is no evidence of tricuspid stenosis. Mild tricuspid regurgitation visualized.       Pulmonic Valve   The pulmonic valve was not well visualized . Left Atrium   Moderately dilated left atrium. Left Ventricle   Left ventricular size and systolic function is normal. Ejection fraction   was estimated at 55-60%. LV wall thickness is within normal limits. Right Atrium   Mildly enlarged right atrium size. Right Ventricle   The right ventricular size appears normal with normal systolic function   and wall thickness. Right ventricular systolic pressure of 33-69 mm Hg consistent with mild   pulmonary hypertension. Pericardial Effusion   The pericardium appears normal with no evidence of a pericardial effusion. Pleural Effusion   No evidence of pleural effusion. Aorta / Great Vessels   -Aortic root dimension within normal limits. -IVC size is within normal limits with normal respiratory phasic changes. Stress:     Left Heart Cath:   Post-procedure Diagnosis/Findings:           No sig CAD  Severe volume overload with secondary PH  LVEDP 35-40  PA pressures 60-70  Severe MR     MitraClip work-up as outpatient                                                                                          MD MD Qamar Shelton, [address]  Electronically signed 6/11/2021 at 4:59 PM  Interventional Cardiology  Lab Data:    Cardiac Enzymes:  No results for input(s): CKTOTAL, CKMB, CKMBINDEX, TROPONINI in the last 72 hours.     CBC:   Lab Results   Component Value Date    WBC 9.7 06/10/2021    RBC 3.04 06/10/2021    RBC 4.52 12/27/2017    HGB 9.0 06/13/2021    HCT 31.6 06/13/2021     06/10/2021       CMP:    Lab Results   Component Value Date     06/13/2021    K 4.0 06/13/2021     06/13/2021    CO2 26 06/13/2021    BUN 20 06/13/2021    CREATININE 1.4 06/13/2021    AGRATIO 1.1 12/27/2017    LABGLOM 38 06/13/2021    GLUCOSE 106 06/13/2021    GLUCOSE 107 12/27/2017    CALCIUM 8.2 06/13/2021       Hepatic Function Panel:    Lab Results   Component Value Date    ALKPHOS 91 06/09/2021    ALT 18 06/09/2021 AST 21 06/09/2021    PROT 6.7 06/09/2021    BILITOT 1.2 06/09/2021    BILITOT 0.7 12/27/2017    BILIDIR 0.2 12/27/2017    LABALBU 3.9 06/09/2021       Magnesium:    Lab Results   Component Value Date    MG 1.8 06/12/2021       PT/INR:    Lab Results   Component Value Date    INR 1.03 06/11/2021       HgBA1c:    Lab Results   Component Value Date    LABA1C 6.3 12/27/2017       FLP:    Lab Results   Component Value Date    TRIG 124 12/27/2017       TSH:    Lab Results   Component Value Date    TSH 2.200 06/09/2021         Assessment:  AMS/confusion/fatigue - improved  Elevated troponins - denies cp  ? GIB  Anemia - s/p transfusion - GI following  Junctional bradycardia - lowest 38, otherwise 50s while  awake-frequent PACs/bigeminy    CAD - s/p PCI x2 - details unknown  S/p cath 06/11--no sig CAD, severe volume overload   Ef 55-60 per TTE 6/10/21  Mod MR, possible flail leaflet per TTE  HTN  DM  Dyslipidemia  CKD  COPD/asthma - on home O2  Hx pulmonary AV malformation  Morbid obesity  Hx TIA    Plan:    Keep mag >2 and K >4, replace prn  Normal TSH 6/9/21  Avoid AVN blocking meds due junctional bradycardia   Consult EP - pacer evaluation---dr Gar yet to see. Cont statin/imdur    Will hold lasix right now as kidney function worsened over past day. Will follow with EP recs.       Electronically signed by Irene Maldonado PA-C on 6/13/2021 at 8:22 AM

## 2021-06-13 NOTE — CONSULTS
[address] [facility] ([facility]  Dept: [phone]       CARDIAC ELECTROPHYSIOLOGY: CONSULTATION NOTE  PATIENT DEMOGRAPHICS:  Date:   6/13/2021  Patient name:              Rex Breen  YOB: 1960  Sex: female   MRN:   [id number]    PRIMARY CARE PHYSICIAN:   Shirlene Robin MD    REFERRING PROVIDER:  Geovanny Merrill MD    REASON FOR CONSULTATION:  Junctional bradycardia, evaluation for pacemaker implantation. HISTORY OF PRESENT ILLNESS:  61/F was noted to be acting unusual at her home by her caretaker who lives with her. She was escorted to a local emergency room where she was noted to have junctional bradycardia and was transferred here for further management. The patient is known to have chronic anemia since her early childhood on iron supplements and is currently being evaluated for possible GI bleed. EP service was consulted for evaluation for pacemaker implantation. The patient spends most of the time in the bed as she has had several falls because of weakness both lower extremities. She does report intermittent lightheadedness but denies presyncope/syncope, chest pain, shortness of breath or lower extremity swelling. She does report fatigue when she tries to walk with the help of a walker. The patient's routine laboratory work-up was significant for anemia and received 2 units of blood transfusion, current hemoglobin 9.5. Echocardiogram showed normal left ventricle size and function and moderate mitral regurgitation. Coronary angiogram showed nonobstructive coronary artery disease with patent stents in proximal LAD. Medcial hx: CAD/PCI-LAD (2/20), preserved LVEF (55%),  HTN, DM, CKD-III, PVD, anemia of chronic illness, depression and dementia. REVIEW OF SYSTEMS:    Constitutional: Negative for chills and fever  HENT: Negative for congestion, sinus pressure, sneezing and sore throat.     Eyes: Negative for pain, discharge, redness and itching. Respiratory: Negative for apnea, cough  Gastrointestinal: Negative for blood in stool, constipation, diarrhea   Endocrine: Negative for cold intolerance, heat intolerance, polydipsia. Genitourinary: Negative for dysuria, enuresis, flank pain and hematuria. Musculoskeletal: Negative for arthralgias, joint swelling and neck pain. Neurological: Negative for numbness and headaches. Psychiatric/Behavioral: Negative for agitation, confusion, decreased concentration and dysphoric mood. PAST MEDICAL HISTORY:  Past Medical History:   Diagnosis Date    Agoraphobia     Anemia     Anxiety     Asthma     CAD (coronary artery disease)     COPD [facility]chronic obstructive pulmonary disease) [facility]     Dementia (HCC)     Depression     Diabetes mellitus (HCC)     GERD (gastroesophageal reflux disease)     Hypertension     Neuropathy     PTSD (post-traumatic stress disorder)     PVD (peripheral vascular disease) ([email or web address]ca [facility]     Renal disease     Stage 3       PSH:  Past Surgical History:   Procedure Laterality Date    CARDIAC CATHETERIZATION      CHOLECYSTECTOMY  2014    CORONARY ANGIOPLASTY WITH STENT PLACEMENT  2019    x2    ESOPHAGEAL MOTILITY STUDY Left 5/24/2019    ESOPHAGEAL MOTILITY/MANOMETRY STUDY performed by Kory Santiago MD at [facility]    HAND SURGERY Right     HERNIA REPAIR  2016    HYSTERECTOMY, TOTAL ABDOMINAL  1982    OVARY REMOVAL Bilateral 1982    TONSILLECTOMY AND ADENOIDECTOMY      UPPER GASTROINTESTINAL ENDOSCOPY         FAMILY HISTORY:  No family history on file.      SOCIAL HISTORY:  Social History     Socioeconomic History    Marital status:      Spouse name: None    Number of children: None    Years of education: None    Highest education level: None   Occupational History    None   Tobacco Use    Smoking status: Former Smoker     Packs/day: 0.50     Years: 40.00     Pack years: 20.00     Types: Cigarettes     Start date: 1/1/1970    Smokeless tobacco: Never Used   Substance and Sexual Activity    Alcohol use: Not Currently    Drug use: Not Currently    Sexual activity: Not Currently   Other Topics Concern    None   Social History Narrative    None     Social Determinants of Health     Financial Resource Strain:     Difficulty of Paying Living Expenses:    Food Insecurity:     Worried About Running Out of Food in the Last Year:     Ran Out of Food in the Last Year:    Transportation Needs:     Lack of Transportation (Medical):  Lack of Transportation (Non-Medical):    Physical Activity:     Days of Exercise per Week:     Minutes of Exercise per Session:    Stress:     Feeling of Stress :    Social Connections:     Frequency of Communication with Friends and Family:     Frequency of Social Gatherings with Friends and Family:     Attends Religion Services:     Active Member of Clubs or Organizations:     Attends Club or Organization Meetings:     Marital Status:    Intimate Partner Violence:     Fear of Current or Ex-Partner:     Emotionally Abused:     Physically Abused:     Sexually Abused:          ALLERGY HISTORY:  Allergies   Allergen Reactions    Adhesive Tape      Causes skin tears    Baclofen Other (See Comments)     All muscle relaxers, Lethargy    Donepezil     Norco [Hydrocodone-Acetaminophen] Other (See Comments)     Muscle weakness    Percocet [Oxycodone-Acetaminophen]     Tizanidine         MEDICATIONS:  Current Facility-Administered Medications   Medication Dose Route Frequency Provider Last Rate Last Admin    [Held by provider] furosemide (LASIX) tablet 20 mg  20 mg Oral Daily Raji Salgado PA-C        potassium chloride (KLOR-CON M) extended release tablet 40 mEq  40 mEq Oral PRN Aruna Kimball MD        Or    potassium bicarb-citric acid (EFFER-K) effervescent tablet 40 mEq  40 mEq Oral PRN Aruna Kimball MD        Or    potassium chloride 10 mEq/100 mL IVPB (Peripheral Line)  10 mEq Intravenous PRBEAU Voss MD        magnesium sulfate 2000 mg in dextrose 50 mL IVPB  2,000 mg Intravenous PRN Aylin Voss MD        potassium chloride (KLOR-CON M) extended release tablet 20 mEq  20 mEq Oral PRN Aylin Voss MD        gabapentin (NEURONTIN) capsule 300 mg  300 mg Oral TID Reche Chamber, DO   300 mg at 06/13/21 0956    pantoprazole (PROTONIX) tablet 40 mg  40 mg Oral QAM AC SHERMAN Wang CNP   40 mg at 06/13/21 0559    atorvastatin (LIPITOR) tablet 40 mg  40 mg Oral Daily Reche Chamber, DO   40 mg at 06/12/21 0855    citalopram (CELEXA) tablet 20 mg  20 mg Oral Daily Reche Chamber, DO   20 mg at 06/13/21 0956    ferrous sulfate (IRON 325) tablet 325 mg  325 mg Oral BID Reche Chamber, DO   325 mg at 34/66/98 6435    folic acid (FOLVITE) tablet 1 mg  1 mg Oral Daily Reche Chamber, DO   1 mg at 06/13/21 0956    isosorbide mononitrate (IMDUR) extended release tablet 15 mg  15 mg Oral Daily Reche Chamber, DO   15 mg at 06/13/21 0956    levothyroxine (SYNTHROID) tablet 25 mcg  25 mcg Oral Daily Reche Chamber, DO   25 mcg at 06/13/21 0559    nicotine (NICODERM CQ) 14 MG/24HR 1 patch  1 patch Transdermal Q24H Reche Chamber, DO   1 patch at 06/12/21 1222    traZODone (DESYREL) tablet 100 mg  100 mg Oral Nightly Reche Chamber, DO   100 mg at 06/12/21 2100    vitamin B-12 (CYANOCOBALAMIN) tablet 1,000 mcg  1,000 mcg Oral Daily Reche Chamber, DO   1,000 mcg at 06/13/21 0956    memantine (NAMENDA) tablet 10 mg  10 mg Oral BID Reche Chamber, DO   10 mg at 06/13/21 0956    insulin lispro (HUMALOG) injection vial 0-6 Units  0-6 Units Subcutaneous TID WC Reche Chamber, DO   4 Units at 06/12/21 1222    insulin lispro (HUMALOG) injection vial 0-3 Units  0-3 Units Subcutaneous Nightly Reche Chamber, DO        sodium chloride flush 0.9 % injection 5-40 mL  5-40 mL Intravenous 2 times per day Alicia Harry MD   10 mL at 06/13/21 0957    sodium chloride flush 0.9 % injection 5-40 mL  5-40 mL Intravenous PRN Alver Jolly 06/13/2021    HCT 31.6 (L) 06/13/2021     06/10/2021    CHOL 129 12/27/2017    TRIG 124 12/27/2017    ALT 18 06/09/2021    AST 21 06/09/2021     06/13/2021    K 4.0 06/13/2021     06/13/2021    CREATININE 1.4 (H) 06/13/2021    BUN 20 06/13/2021    CO2 26 06/13/2021    TSH 2.200 06/09/2021    INR 1.03 06/11/2021    LABA1C 6.3 12/27/2017    LABMICR 1.85 12/27/2017   Echocardiogram 6/9/2021: LVEF 55-60%, moderate MR, normal LV wall thickness and RVSP 35-40 mm Hg. ECG severe sinus bradycardia with junctional escape (44 bpm) and isorhythmic AV dissociation. Normal QRSd. Coronary angiogram 6/9/2021: Non-obstructive CAD with patent proximal LKAD stent. Telemetry sinus bradycardia with junctional escape and AV dissociation. IMPRESSION:  1. Symptomatic sinus bradycardia with junctional escape and isorhythmic A-V dissociation. 2.  CAD/PCI-LAD (2//2020), patent stents. Preserved LVEF (55-60 %). 3.  Moderate mitral regurgitation. 4.  Chronic anemia, hemoglobin 9.5 g/dL. 5.  HTN, DM, CKD-III, PVD,      ASSESSMENT AND RECOMMENDATIONS:  The patient has symptomatic sinus bradycardia, persistent with junctional escape and isorhythmic A-V dissociation. She is currently off beta-blockers and continues to have issues with bradycardia. I think her recurrent falls could possibly related to bradycardia. She has indication for beta-blockers for the management of coronary artery disease. I think she will be benefited by a dual-chamber pacemaker implantation. Discussed the diagnosis and management options. he risk and benefits of permanent dual chamber  implantation including pneumothorax, hemothorax, bleeding, vascular complications, infection, pericardial tamponade requiring emergency pericardiocentesis, lead dislodgement requiring reposition, future operations for generator change or device revision or upgrade were discussed with the patient. She verbalized understanding of the discussion.  Her questions were answered. The patient wishes to proceed. **This report has been created using voice recognition software. It may contain minor errors which are inherent in voice recognition technology. **       Electronically signed by Qian Moore MD, [facility] on 6/13/2021 at 10:33 AM

## 2021-06-13 NOTE — PROGRESS NOTES
hemoptysis, hematochezia, or hematuria. She denies any fevers, chills, chest pain, abdominal pain, N/V/D otherwise. She is always short of breath and chronically uses oxygen at home. Patient not able to tell me if she takes any blood thinners or NSAIDs.     Records from [facility] reviewed. Labs showed BMP with BUN/Cr 35/1.2 Ca 10.4. CBC showed H/H 7.5/22.5, MCV 99.9, WBC wnl. Troponin 0.25, 0.27. XR showed stable cardiomegaly, no evidence of CHF. Patient was given 2 units PRBCs at [facility] and transferred to [facility] for further evaluation. EKG en route via EMS showed junctional rhythm.        6/10/21: S/p 2 units of pRBC transfusion. Stable. EGD when stable. Subjective (past 24 hours): Patient was seen and examined at bedside.   More awake and alert answering questions-understood what is going on denies any nausea vomiting diarrhea no gross bleeds  Discussed about the possible pacemaker tomorrow      Medications:  Reviewed    Infusion Medications    sodium chloride      dextrose       Scheduled Medications    [Held by provider] furosemide  20 mg Oral Daily    gabapentin  300 mg Oral TID    pantoprazole  40 mg Oral QAM AC    atorvastatin  40 mg Oral Daily    citalopram  20 mg Oral Daily    ferrous sulfate  325 mg Oral BID    folic acid  1 mg Oral Daily    isosorbide mononitrate  15 mg Oral Daily    levothyroxine  25 mcg Oral Daily    nicotine  1 patch Transdermal Q24H    traZODone  100 mg Oral Nightly    vitamin B-12  1,000 mcg Oral Daily    memantine  10 mg Oral BID    insulin lispro  0-6 Units Subcutaneous TID     insulin lispro  0-3 Units Subcutaneous Nightly    sodium chloride flush  5-40 mL Intravenous 2 times per day     PRN Meds: potassium chloride **OR** potassium alternative oral replacement **OR** potassium chloride, magnesium sulfate, potassium chloride, sodium chloride flush, sodium chloride, ondansetron **OR** ondansetron, polyethylene glycol, acetaminophen **OR** acetaminophen, glucose, dextrose, glucagon (rDNA), dextrose      Intake/Output Summary (Last 24 hours) at 6/13/2021 1359  Last data filed at 6/13/2021 0330  Gross per 24 hour   Intake 740 ml   Output 2550 ml   Net -1810 ml       Diet:  ADULT DIET; Regular; 5 carb choices (75 gm/meal)    Exam:  BP (!) 108/54   Pulse 61   Temp 98.2 °F (36.8 °C) (Oral)   Resp 16   Wt 255 lb 9.6 oz (115.9 kg)   SpO2 95%   BMI 48.30 kg/m²     General appearance: Morbidly obese female. Pale appearance. No acute distress. HEENT:  Normal cephalic, atraumatic without obvious deformity. Pupils equal, round, and reactive to light. Extra ocular muscles intact. Conjunctivae/corneas clear. No pallor noted. Neck: Supple, with full range of motion. No jugular venous distention. Trachea midline. Respiratory: On NC, chronic oxygen use. Normal respiratory effort but getting short of breath with sentences. Decreased breath sounds at bilateral bases. Cardiovascular:  Bradycardia noted  Abdomen: Soft, non-tender, non-distended with normal bowel sounds. Musculoskeletal:  Edematous legs - patient reports chronic. Not pitting - likely secondary to body habitus  Skin: Pale appearance to skin  Neurologic:  Neurovascularly intact without any focal sensory/motor deficits. Psychiatric:  Alert and oriented x3, somnolent   Peripheral Pulses: +2 palpable, equal bilaterally       Labs:   Recent Labs     06/11/21  0847 06/12/21  0416 06/13/21  0326   HGB 9.4* 9.5* 9.0*   HCT 31.9* 32.3* 31.6*     Recent Labs     06/11/21  0330 06/12/21  1039 06/13/21  0326    140 141   K 4.2 3.6 4.0    107 107   CO2 24 24 26   BUN 24* 20 20   CREATININE 1.0 1.2 1.4*   CALCIUM 8.6 8.3* 8.2*     No results for input(s): AST, ALT, BILIDIR, BILITOT, ALKPHOS in the last 72 hours. Recent Labs     06/11/21  1313   INR 1.03     No results for input(s): Kerri Anchors in the last 72 hours. No results for input(s): PROCAL in the last 72 hours.     Microbiology: Urinalysis:      Lab Results   Component Value Date    NITRU NEGATIVE 06/09/2021    WBCUA 3-5 12/27/2017    BACTERIA 2+ 12/27/2017    RBCUA 0-2 12/27/2017    BLOODU NEGATIVE 06/09/2021    SPECGRAV 1.025 12/27/2017    GLUCOSEU NEGATIVE 06/09/2021       Radiology:  No results found.     DVT prophylaxis: [] Lovenox                                 [x] SCDs                                 [] SQ Heparin                                 [] Encourage ambulation           [] Already on Anticoagulation     Code Status: Full Code    Tele:   [x] yes              [] no    Active Hospital Problems    Diagnosis Date Noted    Bradycardia [R00.1] 06/10/2021    Anemia [D64.9]     GI bleed [K92.2] 06/09/2021       Electronically signed by Soham Pratt MD on 6/13/2021 at 1:59 PM

## 2021-06-13 NOTE — PLAN OF CARE
Call light within reach, bed in lowest position, bed alarm on, room located near nurses station. Non-slip footwear on.      Problem: Falls - Risk of:  Goal: Will remain free from falls  Description: Will remain free from falls  Outcome: Met This Shift  Goal: Absence of physical injury  Description: Absence of physical injury  Outcome: Met This Shift   No complaints of pain during shift  Problem: Pain:  Goal: Patient's pain/discomfort is manageable  Description: Patient's pain/discomfort is manageable  Outcome: Met This Shift

## 2021-06-14 LAB
ANION GAP SERPL CALCULATED.3IONS-SCNC: 6 MEQ/L (ref 8–16)
BUN BLDV-MCNC: 21 MG/DL (ref 7–22)
CALCIUM SERPL-MCNC: 8.5 MG/DL (ref 8.5–10.5)
CHLORIDE BLD-SCNC: 108 MEQ/L (ref 98–111)
CO2: 28 MEQ/L (ref 23–33)
CREAT SERPL-MCNC: 0.8 MG/DL (ref 0.4–1.2)
GFR SERPL CREATININE-BSD FRML MDRD: 73 ML/MIN/1.73M2
GLUCOSE BLD-MCNC: 119 MG/DL (ref 70–108)
GLUCOSE BLD-MCNC: 127 MG/DL (ref 70–108)
GLUCOSE BLD-MCNC: 133 MG/DL (ref 70–108)
GLUCOSE BLD-MCNC: 158 MG/DL (ref 70–108)
GLUCOSE BLD-MCNC: 203 MG/DL (ref 70–108)
HCT VFR BLD CALC: 29.6 % (ref 37–47)
HEMOGLOBIN: 8.6 GM/DL (ref 12–16)
POTASSIUM REFLEX MAGNESIUM: 3.9 MEQ/L (ref 3.5–5.2)
SODIUM BLD-SCNC: 142 MEQ/L (ref 135–145)

## 2021-06-14 PROCEDURE — 6370000000 HC RX 637 (ALT 250 FOR IP): Performed by: STUDENT IN AN ORGANIZED HEALTH CARE EDUCATION/TRAINING PROGRAM

## 2021-06-14 PROCEDURE — 6370000000 HC RX 637 (ALT 250 FOR IP): Performed by: INTERNAL MEDICINE

## 2021-06-14 PROCEDURE — 2580000003 HC RX 258: Performed by: STUDENT IN AN ORGANIZED HEALTH CARE EDUCATION/TRAINING PROGRAM

## 2021-06-14 PROCEDURE — 36415 COLL VENOUS BLD VENIPUNCTURE: CPT

## 2021-06-14 PROCEDURE — 85018 HEMOGLOBIN: CPT

## 2021-06-14 PROCEDURE — 85014 HEMATOCRIT: CPT

## 2021-06-14 PROCEDURE — 6370000000 HC RX 637 (ALT 250 FOR IP): Performed by: NURSE PRACTITIONER

## 2021-06-14 PROCEDURE — 6370000000 HC RX 637 (ALT 250 FOR IP): Performed by: HOSPITALIST

## 2021-06-14 PROCEDURE — 82948 REAGENT STRIP/BLOOD GLUCOSE: CPT

## 2021-06-14 PROCEDURE — 99232 SBSQ HOSP IP/OBS MODERATE 35: CPT | Performed by: INTERNAL MEDICINE

## 2021-06-14 PROCEDURE — 99232 SBSQ HOSP IP/OBS MODERATE 35: CPT | Performed by: STUDENT IN AN ORGANIZED HEALTH CARE EDUCATION/TRAINING PROGRAM

## 2021-06-14 PROCEDURE — 80048 BASIC METABOLIC PNL TOTAL CA: CPT

## 2021-06-14 PROCEDURE — 2140000000 HC CCU INTERMEDIATE R&B

## 2021-06-14 RX ORDER — LORAZEPAM 0.5 MG/1
0.5 TABLET ORAL EVERY 6 HOURS PRN
Status: DISCONTINUED | OUTPATIENT
Start: 2021-06-14 | End: 2021-06-16 | Stop reason: HOSPADM

## 2021-06-14 RX ADMIN — GABAPENTIN 300 MG: 300 CAPSULE ORAL at 09:29

## 2021-06-14 RX ADMIN — ACETAMINOPHEN 650 MG: 325 TABLET ORAL at 09:30

## 2021-06-14 RX ADMIN — MEMANTINE HYDROCHLORIDE 10 MG: 10 TABLET, FILM COATED ORAL at 20:11

## 2021-06-14 RX ADMIN — ATORVASTATIN CALCIUM 40 MG: 40 TABLET, FILM COATED ORAL at 20:11

## 2021-06-14 RX ADMIN — SODIUM CHLORIDE, PRESERVATIVE FREE 10 ML: 5 INJECTION INTRAVENOUS at 09:31

## 2021-06-14 RX ADMIN — GABAPENTIN 300 MG: 300 CAPSULE ORAL at 20:12

## 2021-06-14 RX ADMIN — PANTOPRAZOLE SODIUM 40 MG: 40 TABLET, DELAYED RELEASE ORAL at 09:29

## 2021-06-14 RX ADMIN — FUROSEMIDE 20 MG: 20 TABLET ORAL at 09:30

## 2021-06-14 RX ADMIN — FOLIC ACID 1 MG: 1 TABLET ORAL at 09:29

## 2021-06-14 RX ADMIN — CITALOPRAM 20 MG: 20 TABLET, FILM COATED ORAL at 09:29

## 2021-06-14 RX ADMIN — FERROUS SULFATE TAB 325 MG (65 MG ELEMENTAL FE) 325 MG: 325 (65 FE) TAB at 20:11

## 2021-06-14 RX ADMIN — FERROUS SULFATE TAB 325 MG (65 MG ELEMENTAL FE) 325 MG: 325 (65 FE) TAB at 09:29

## 2021-06-14 RX ADMIN — LORAZEPAM 0.5 MG: 0.5 TABLET ORAL at 23:52

## 2021-06-14 RX ADMIN — LEVOTHYROXINE SODIUM 25 MCG: 0.03 TABLET ORAL at 06:00

## 2021-06-14 RX ADMIN — TRAZODONE HYDROCHLORIDE 100 MG: 100 TABLET ORAL at 23:52

## 2021-06-14 RX ADMIN — Medication 1000 MCG: at 09:29

## 2021-06-14 RX ADMIN — SODIUM CHLORIDE, PRESERVATIVE FREE 10 ML: 5 INJECTION INTRAVENOUS at 20:14

## 2021-06-14 RX ADMIN — GABAPENTIN 300 MG: 300 CAPSULE ORAL at 12:38

## 2021-06-14 RX ADMIN — ISOSORBIDE MONONITRATE 15 MG: 30 TABLET ORAL at 09:30

## 2021-06-14 RX ADMIN — MEMANTINE HYDROCHLORIDE 10 MG: 10 TABLET, FILM COATED ORAL at 09:29

## 2021-06-14 ASSESSMENT — PAIN SCALES - GENERAL
PAINLEVEL_OUTOF10: 0
PAINLEVEL_OUTOF10: 0
PAINLEVEL_OUTOF10: 3
PAINLEVEL_OUTOF10: 0

## 2021-06-14 ASSESSMENT — PAIN DESCRIPTION - PAIN TYPE: TYPE: ACUTE PAIN

## 2021-06-14 ASSESSMENT — PAIN DESCRIPTION - ORIENTATION: ORIENTATION: RIGHT

## 2021-06-14 ASSESSMENT — PAIN DESCRIPTION - LOCATION: LOCATION: GROIN

## 2021-06-14 NOTE — CARE COORDINATION
6/14/21, 2:48 PM EDT    DISCHARGE ON GOING [address] day: 5  Location: -24/024-A Reason for admit: GI bleed [K92.2]   Procedure:   6/11 Cardiac cath - Severe mitral regurgitation; no significant coronary artery  disease; elevated right heart cath pressures with preserved cardiac  Output. 6/15 Pacemaker placement planned. Barriers to Discharge: Transferred from  to  over the weekend after heart cath. Planning pacemaker tomorrow. Hospitalist, Cardiology and EP following. GI signed off, plan to work up as OP for EGD/Colonoscopy. Lipitor, Celexa, Iron tabs, Folvite, Lasix po daily, Neurontin tid, Imdur, Synthroid, Namenda, Nicotine patch, Protonix, Klor-Con, Desyrel and Vit B12. Electrolyte replacements prn. Diabetes management. Per Hospitalist, possible home tomorrow or Wednesday pending timing of pacemaker. PCP: Nina Rhodes MD  Readmission Risk Score: 12%  Patient Goals/Plan/Treatment Preferences: Spoke with pt, she plans home with her full time caregiver. States she uses [place] [id number] ambulance services for rides home from the hospital. SW updated.

## 2021-06-14 NOTE — PROGRESS NOTES
Nurse spoke with Malcolm Sandifer with [facility]. Updated her on the plan of care. She said she will need called at discharge. Her number is [phone].

## 2021-06-14 NOTE — PROGRESS NOTES
Cardiology Progress Note      Patient:  Demarco Weber  YOB: 1960  MRN: [id number]   Acct: [de-identified]  Admit Date:  6/9/2021  Primary Cardiologist: Fernando Fajardo     Note per dr Aubery Liv Rachele Kayser for Consultation:  Bradycardia, GI bleeding        History Of Present Illness:    57 y. o. pleasant female c hx of CAD s/p PCI in 2/20,  DM , who was transferred from [facility]. Bimal Asim was taken by her room mate to [facility] due to feeling not well and tiredness. Bimaltravis Dailey has a hx of Anemia, Asthma, COPD, Dementia, Depression, DM, HTN, PVD and Stage 3 CKD.  She was apparently scheduled for colonoscopy next week to see where she is loosing blood.   H/H 9.8/32, in the past it used to 14/43 in 2017.  Patient at present denies any chest pain, sob, palpitations.  EKG shows junctional rhythm at HR 46 bpm, NSST.  Troponins were mildly at 0.115. Not on any AV juwan blocking agents. Patient is a poor historian and most of the info was obtained from outside records. \"    Subjective (Events in last 24 hours):   Pt awake, alert. NAD. States she is feeling okay. She is feeling at her baseline breathing adame. Dr Pamela Alcantar saw for [facility] evaluation and will schedule with cath lab. D/w patient about adding low dose lasix to keep fluid off without being too dry and patient is in agreement. Was seen by Dr Pamela Alcantar, [address]. Needs PPM. Timing TBD by cath lab. Hopefully by Tuesday.    Objective:   BP (!) 102/47   Pulse 81   Temp 98.5 °F (36.9 °C) (Oral)   Resp 16   Wt 245 lb 12.8 oz (111.5 kg)   SpO2 94%   BMI 46.44 kg/m²      vss  TELEMETRY:junctional, 60s      Physical Exam:  General Appearance: alert and oriented to person, place and time, in no acute distress  Cardiovascular: slow rate, regular rhythm, normal S1 and S2, no murmurs, rubs, clicks, or gallops, distal pulses intact, no carotid bruits, no JVD  Pulmonary/Chest: CTAB, no wheezes, crackles  Abdomen: soft, non-tender, non-distended, normal bowel sounds, no masses   Extremities: no cyanosis, clubbing, trace LE edema, + DP pulse,    Skin: warm and dry, no rash or erythema  Head: normocephalic and atraumatic  Eyes: pupils equal, round, and reactive to light  Neck: supple and non-tender without mass, no thyromegaly   Musculoskeletal: normal range of motion, no joint swelling, deformity or tenderness  Neurological: alert, oriented, normal speech, no focal findings or movement disorder noted    Medications:    [Held by provider] furosemide  20 mg Oral Daily    gabapentin  300 mg Oral TID    pantoprazole  40 mg Oral QAM AC    atorvastatin  40 mg Oral Daily    citalopram  20 mg Oral Daily    ferrous sulfate  325 mg Oral BID    folic acid  1 mg Oral Daily    isosorbide mononitrate  15 mg Oral Daily    levothyroxine  25 mcg Oral Daily    nicotine  1 patch Transdermal Q24H    traZODone  100 mg Oral Nightly    vitamin B-12  1,000 mcg Oral Daily    memantine  10 mg Oral BID    insulin lispro  0-6 Units Subcutaneous TID WC    insulin lispro  0-3 Units Subcutaneous Nightly    sodium chloride flush  5-40 mL Intravenous 2 times per day      sodium chloride      dextrose       potassium chloride, 40 mEq, PRN   Or  potassium alternative oral replacement, 40 mEq, PRN   Or  potassium chloride, 10 mEq, PRN  magnesium sulfate, 2,000 mg, PRN  potassium chloride, 20 mEq, PRN  sodium chloride flush, 5-40 mL, PRN  sodium chloride, 25 mL, PRN  ondansetron, 4 mg, Q8H PRN   Or  ondansetron, 4 mg, Q6H PRN  polyethylene glycol, 17 g, Daily PRN  acetaminophen, 650 mg, Q6H PRN   Or  acetaminophen, 650 mg, Q6H PRN  glucose, 15 g, PRN  dextrose, 12.5 g, PRN  glucagon (rDNA), 1 mg, PRN  dextrose, 100 mL/hr, PRN        Diagnostics:  EKG:    Junctional rhythm  ST & T wave abnormality, consider inferior ischemia  ST & T wave abnormality, consider anterolateral ischemia  Abnormal ECG  When compared with ECG of 10-MOISE-2021 12:08,  Inverted T waves have replaced nonspecific T wave abnormality in Inferior leads  Confirmed by Coretta Burk ([id number]) on 6/11/2021 7:08:15 PM  Echo:   Conclusions      Summary   Probe easily inserted by Cardiologist.   Procedure performed without complications. The study included complete 2D imaging, complete spectral doppler, and   color doppler. The transesophageal approach was used. Risk benefits and alternatives were explained to the patient and informed   consent was obtained. Ejection fraction was estimated at 50-55%. Left atrial size was severely dilated with no thrombus identified. APPENDAGE: The left atrial appendage size was normal with no thrombus   identified. DOPPLER: The function was normal (normal emptying velocity). ATRIAL SEPTUM: Small PFO identified by color and bubble study. The mitral valve structure was degenerated with restriction of the   posterior leaflet. DOPPLER: The transmitral velocity was within the normal   range with no evidence for mitral stenosis. There was severe mitral   regurgitation (mean gradient 2 mmHg). Mild tricuspid regurgitation. Signature      ----------------------------------------------------------------   Electronically signed by Ryan Castorena MD (Interpreting   physician) on 06/11/2021 at 05:28 PM  Electronically signed by Anita Alvarez MD (Interpreting   physician) on 06/10/2021 at 04:34 PM   ----------------------------------------------------------------      Findings      Mitral Valve   The mitral valve was not well visualized. Possible Flail leaflet. Moderate mitral regurgitation is present. Consider THERON to further evaluate   Mitral valve if clinically indicated. Aortic Valve   The aortic valve appears trileaflet with normal thickness and leaflet   excursion. DOPPLER: Transaortic velocity was within the normal range with   no evidence of aortic stenosis. Mild aortic regurgitation is noted. Tricuspid Valve   The tricuspid valve structure is normal with normal leaflet separation.    DOPPLER: There is no evidence of tricuspid stenosis. Mild tricuspid regurgitation visualized. Pulmonic Valve   The pulmonic valve was not well visualized . Left Atrium   Moderately dilated left atrium. Left Ventricle   Left ventricular size and systolic function is normal. Ejection fraction   was estimated at 55-60%. LV wall thickness is within normal limits. Right Atrium   Mildly enlarged right atrium size. Right Ventricle   The right ventricular size appears normal with normal systolic function   and wall thickness. Right ventricular systolic pressure of 30-33 mm Hg consistent with mild   pulmonary hypertension. Pericardial Effusion   The pericardium appears normal with no evidence of a pericardial effusion. Pleural Effusion   No evidence of pleural effusion. Aorta / Great Vessels   -Aortic root dimension within normal limits. -IVC size is within normal limits with normal respiratory phasic changes. Stress:     Left Heart Cath:   Post-procedure Diagnosis/Findings:           No sig CAD  Severe volume overload with secondary PH  LVEDP 35-40  PA pressures 60-70  Severe MR     MitraClip work-up as outpatient                                                                                          Luana Holes, MD MD Malcolm Sandifer, [address]  Electronically signed 6/11/2021 at 4:59 PM  Interventional Cardiology  Lab Data:    Cardiac Enzymes:  No results for input(s): CKTOTAL, CKMB, CKMBINDEX, TROPONINI in the last 72 hours.     CBC:   Lab Results   Component Value Date    WBC 9.7 06/10/2021    RBC 3.04 06/10/2021    RBC 4.52 12/27/2017    HGB 8.6 06/14/2021    HCT 29.6 06/14/2021     06/10/2021       CMP:    Lab Results   Component Value Date     06/14/2021    K 3.9 06/14/2021     06/14/2021    CO2 28 06/14/2021    BUN 21 06/14/2021    CREATININE 0.8 06/14/2021    AGRATIO 1.1 12/27/2017    LABGLOM 73 06/14/2021    GLUCOSE 127 06/14/2021    GLUCOSE 107 12/27/2017    CALCIUM 8.5 06/14/2021       Hepatic Function Panel:    Lab Results   Component Value Date    ALKPHOS 91 06/09/2021    ALT 18 06/09/2021    AST 21 06/09/2021    PROT 6.7 06/09/2021    BILITOT 1.2 06/09/2021    BILITOT 0.7 12/27/2017    BILIDIR 0.2 12/27/2017    LABALBU 3.9 06/09/2021       Magnesium:    Lab Results   Component Value Date    MG 1.8 06/12/2021       PT/INR:    Lab Results   Component Value Date    INR 1.03 06/11/2021       HgBA1c:    Lab Results   Component Value Date    LABA1C 6.3 12/27/2017       FLP:    Lab Results   Component Value Date    TRIG 124 12/27/2017       TSH:    Lab Results   Component Value Date    TSH 2.200 06/09/2021         Assessment:  AMS/confusion/fatigue - improved  Elevated troponins - denies cp  ? GIB  Anemia - s/p transfusion - GI following  Junctional bradycardia - lowest 38, otherwise 50s while  awake-frequent PACs/bigeminy    CAD - s/p PCI x2 - details unknown  S/p cath 06/11--no sig CAD, severe volume overload   Ef 55-60 per TTE 6/10/21  Mod MR, possible flail leaflet per TTE  HTN  DM  Dyslipidemia  CKD  COPD/asthma - on home O2  Hx pulmonary AV malformation  Morbid obesity  Hx TIA    Plan:  Start lasix 20 mg daily PO. Keep mag >2 and K >4, replace prn  Normal TSH 6/9/21  Avoid AVN blocking meds due junctional bradycardia   Cont statin/imdur    Will hold lasix right now as kidney function worsened over past day. Needs PPM. Hopefully schedule for Tuesday. Will monitor kidney function with starting low dose lasix and follow with PPM timing.       Electronically signed by Leta Klinefelter, PA-C on 6/14/2021 at 8:05 AM

## 2021-06-14 NOTE — PROGRESS NOTES
Patient is not able to elaborate on this - unsure who her GI doctor is and which procedure she was to have done. Patient states her PCP sent her to [facility] due to her \"blood being low. \" Patient denies any hemoptysis, hematochezia, or hematuria. She denies any fevers, chills, chest pain, abdominal pain, N/V/D otherwise. She is always short of breath and chronically uses oxygen at home. Patient not able to tell me if she takes any blood thinners or NSAIDs.     Records from [facility] reviewed. Labs showed BMP with BUN/Cr 35/1.2 Ca 10.4. CBC showed H/H 7.5/22.5, MCV 99.9, WBC wnl. Troponin 0.25, 0.27. XR showed stable cardiomegaly, no evidence of CHF. Patient was given 2 units PRBCs at [facility] and transferred to [facility] for further evaluation. EKG en route via EMS showed junctional rhythm.        6/10/21: S/p 2 units of pRBC transfusion. Stable. EGD when stable. Subjective (past 24 hours): Patient was seen and examined at bedside.   More awake and alert answering questions-understood what is going on denies any nausea vomiting diarrhea no gross bleeds  Discussed about the possible pacemaker tomorrow  Updated about kidney function    Medications:  Reviewed    Infusion Medications    sodium chloride      dextrose       Scheduled Medications    furosemide  20 mg Oral Daily    gabapentin  300 mg Oral TID    pantoprazole  40 mg Oral QAM AC    atorvastatin  40 mg Oral Daily    citalopram  20 mg Oral Daily    ferrous sulfate  325 mg Oral BID    folic acid  1 mg Oral Daily    isosorbide mononitrate  15 mg Oral Daily    levothyroxine  25 mcg Oral Daily    nicotine  1 patch Transdermal Q24H    traZODone  100 mg Oral Nightly    vitamin B-12  1,000 mcg Oral Daily    memantine  10 mg Oral BID    insulin lispro  0-6 Units Subcutaneous TID WC    insulin lispro  0-3 Units Subcutaneous Nightly    sodium chloride flush  5-40 mL Intravenous 2 times per day     PRN Meds: potassium chloride **OR** potassium alternative oral replacement **OR** potassium chloride, magnesium sulfate, potassium chloride, sodium chloride flush, sodium chloride, ondansetron **OR** ondansetron, polyethylene glycol, acetaminophen **OR** acetaminophen, glucose, dextrose, glucagon (rDNA), dextrose      Intake/Output Summary (Last 24 hours) at 6/14/2021 9712  Last data filed at 6/13/2021 2041  Gross per 24 hour   Intake 200 ml   Output 1000 ml   Net -800 ml       Diet:  ADULT DIET; Regular; 5 carb choices (75 gm/meal)    Exam:  BP (!) 119/56   Pulse 63   Temp 98.7 °F (37.1 °C) (Oral)   Resp 16   Wt 245 lb 12.8 oz (111.5 kg)   SpO2 96%   BMI 46.44 kg/m²     General appearance: Morbidly obese female. Pale appearance. No acute distress. HEENT:  Normal cephalic, atraumatic without obvious deformity. Pupils equal, round, and reactive to light. Extra ocular muscles intact. Conjunctivae/corneas clear. No pallor noted. Neck: Supple, with full range of motion. No jugular venous distention. Trachea midline. Respiratory: On NC, chronic oxygen use. Normal respiratory effort but getting short of breath with sentences. Decreased breath sounds at bilateral bases. Cardiovascular:  Bradycardia noted  Abdomen: Soft, non-tender, non-distended with normal bowel sounds. Musculoskeletal:  Edematous legs - patient reports chronic. Not pitting - likely secondary to body habitus  Skin: Pale appearance to skin  Neurologic:  Neurovascularly intact without any focal sensory/motor deficits.    Psychiatric:  Alert and oriented x3, somnolent   Peripheral Pulses: +2 palpable, equal bilaterally       Labs:   Recent Labs     06/12/21  0416 06/13/21  0326 06/14/21  0248   HGB 9.5* 9.0* 8.6*   HCT 32.3* 31.6* 29.6*     Recent Labs     06/12/21  1039 06/13/21  0326 06/14/21  0248    141 142   K 3.6 4.0 3.9    107 108   CO2 24 26 28   BUN 20 20 21   CREATININE 1.2 1.4* 0.8   CALCIUM 8.3* 8.2* 8.5     No results for input(s): AST, ALT, BILIDIR, BILITOT, ALKPHOS in the last 72 hours. Recent Labs     06/11/21  1313   INR 1.03     No results for input(s): Paralee Fontan in the last 72 hours. No results for input(s): PROCAL in the last 72 hours. Microbiology:      Urinalysis:      Lab Results   Component Value Date    NITRU NEGATIVE 06/09/2021    WBCUA 3-5 12/27/2017    BACTERIA 2+ 12/27/2017    RBCUA 0-2 12/27/2017    BLOODU NEGATIVE 06/09/2021    SPECGRAV 1.025 12/27/2017    GLUCOSEU NEGATIVE 06/09/2021       Radiology:  ECHO Complete 2D W Doppler W Color    Result Date: 6/10/2021  Transthoracic Echocardiography Report (TTE)  Demographics   Patient Name   Ema Virgne  Gender              Other                 K   MR #           [id number]      Race                                                 Ethnicity   Account #      [de-identified]      Room Number         0019   Accession      [id number]     Date of Study       06/10/2021  Number   Date of Birth  1960     Referring Physician Kennedy Jeong MD   Age            57 year(s)     Sanaz Reynolds, [facility]                                 Interpreting        Sowmya Serna MD                                Physician  Procedure Type of Study   TTE procedure:ECHOCARDIOGRAM COMPLETE 2D W DOPPLER W COLOR. Procedure Date Date: 06/10/2021 Start: 01:24 PM Study Location: Bedside Technical Quality: Adequate visualization Indications:Elevated troponin. Additional Medical History: Former smoker, anemia, asthma, COPD, hypertension, diabetes, coronary artery disease, peripheral vascular disease Patient Status: Routine Height: 61 inches Weight: 236.01 pounds BSA: 2.03 m^2 BMI: 44.59 kg/m^2 BP: 136/74 mmHg  Conclusions   Summary  Technically difficult examination. Left ventricular size and systolic function is normal. Ejection fraction  was estimated at 55-60%. LV wall thickness is within normal limits.   The right ventricular size appears normal with normal systolic function  and wall thickness. Right ventricular systolic pressure of 99-91 mm Hg consistent with mild  pulmonary hypertension. Mild aortic regurgitation is noted. The mitral valve was not well visualized. Possible Flail leaflet. Moderate mitral regurgitation is present. Consider THERON to further evaluate  Mitral valve if clinically indicated. Signature   ----------------------------------------------------------------  Electronically signed by Perla Hankins MD (Interpreting  physician) on 06/10/2021 at 04:34 PM  ----------------------------------------------------------------   Findings   Mitral Valve  The mitral valve was not well visualized. Possible Flail leaflet. Moderate mitral regurgitation is present. Consider THERON to further evaluate  Mitral valve if clinically indicated. Aortic Valve  The aortic valve appears trileaflet with normal thickness and leaflet  excursion. DOPPLER: Transaortic velocity was within the normal range with  no evidence of aortic stenosis. Mild aortic regurgitation is noted. Tricuspid Valve  The tricuspid valve structure is normal with normal leaflet separation. DOPPLER: There is no evidence of tricuspid stenosis. Mild tricuspid regurgitation visualized. Pulmonic Valve  The pulmonic valve was not well visualized . Left Atrium  Moderately dilated left atrium. Left Ventricle  Left ventricular size and systolic function is normal. Ejection fraction  was estimated at 55-60%. LV wall thickness is within normal limits. Right Atrium  Mildly enlarged right atrium size. Right Ventricle  The right ventricular size appears normal with normal systolic function  and wall thickness. Right ventricular systolic pressure of 53-79 mm Hg consistent with mild  pulmonary hypertension. Pericardial Effusion  The pericardium appears normal with no evidence of a pericardial effusion. Pleural Effusion  No evidence of pleural effusion.    Aorta / Kendalia Mess PISA: 0.11 cm^2  MR VTI: 188 cm  Alias Velocity: 38.5  cm/sPISA Radius: 0.5 cm  MV ERO Volumetric: 0.3 cm^2  PISA area: 1.57 cm^2MR flow  rate: 60.44 ml/sMR  volume:20.68 ml  [id number]      Electronically signed by Ramona Cedeño MD on 6/14/2021 at 9:17 AM

## 2021-06-15 ENCOUNTER — APPOINTMENT (OUTPATIENT)
Dept: CARDIAC CATH/INVASIVE PROCEDURES | Age: 61
DRG: 242 | End: 2021-06-15
Attending: FAMILY MEDICINE
Payer: MEDICARE

## 2021-06-15 LAB
ABO: NORMAL
ANION GAP SERPL CALCULATED.3IONS-SCNC: 5 MEQ/L (ref 8–16)
ANTIBODY SCREEN: NORMAL
APTT: 28.8 SECONDS (ref 22–38)
BUN BLDV-MCNC: 18 MG/DL (ref 7–22)
CALCIUM SERPL-MCNC: 8.7 MG/DL (ref 8.5–10.5)
CHLORIDE BLD-SCNC: 106 MEQ/L (ref 98–111)
CO2: 29 MEQ/L (ref 23–33)
CREAT SERPL-MCNC: 1 MG/DL (ref 0.4–1.2)
ERYTHROCYTE [DISTWIDTH] IN BLOOD BY AUTOMATED COUNT: 17.2 % (ref 11.5–14.5)
ERYTHROCYTE [DISTWIDTH] IN BLOOD BY AUTOMATED COUNT: 66.5 FL (ref 35–45)
GFR SERPL CREATININE-BSD FRML MDRD: 56 ML/MIN/1.73M2
GLUCOSE BLD-MCNC: 107 MG/DL (ref 70–108)
GLUCOSE BLD-MCNC: 111 MG/DL (ref 70–108)
GLUCOSE BLD-MCNC: 139 MG/DL (ref 70–108)
GLUCOSE BLD-MCNC: 159 MG/DL (ref 70–108)
GLUCOSE BLD-MCNC: 179 MG/DL (ref 70–108)
GLUCOSE BLD-MCNC: 209 MG/DL (ref 70–108)
HCT VFR BLD CALC: 29.5 % (ref 37–47)
HEMOGLOBIN: 8.4 GM/DL (ref 12–16)
INR BLD: 1.02 (ref 0.85–1.13)
MCH RBC QN AUTO: 30.5 PG (ref 26–33)
MCHC RBC AUTO-ENTMCNC: 28.5 GM/DL (ref 32.2–35.5)
MCV RBC AUTO: 107.3 FL (ref 81–99)
PLATELET # BLD: 204 THOU/MM3 (ref 130–400)
PMV BLD AUTO: 9 FL (ref 9.4–12.4)
POTASSIUM REFLEX MAGNESIUM: 4.2 MEQ/L (ref 3.5–5.2)
RBC # BLD: 2.75 MILL/MM3 (ref 4.2–5.4)
RH FACTOR: NORMAL
SCAN OF BLOOD SMEAR: NORMAL
SODIUM BLD-SCNC: 140 MEQ/L (ref 135–145)
WBC # BLD: 6.8 THOU/MM3 (ref 4.8–10.8)

## 2021-06-15 PROCEDURE — 6370000000 HC RX 637 (ALT 250 FOR IP): Performed by: STUDENT IN AN ORGANIZED HEALTH CARE EDUCATION/TRAINING PROGRAM

## 2021-06-15 PROCEDURE — 80048 BASIC METABOLIC PNL TOTAL CA: CPT

## 2021-06-15 PROCEDURE — 86900 BLOOD TYPING SEROLOGIC ABO: CPT

## 2021-06-15 PROCEDURE — 82948 REAGENT STRIP/BLOOD GLUCOSE: CPT

## 2021-06-15 PROCEDURE — 6370000000 HC RX 637 (ALT 250 FOR IP): Performed by: NURSE PRACTITIONER

## 2021-06-15 PROCEDURE — 93005 ELECTROCARDIOGRAM TRACING: CPT | Performed by: STUDENT IN AN ORGANIZED HEALTH CARE EDUCATION/TRAINING PROGRAM

## 2021-06-15 PROCEDURE — C1894 INTRO/SHEATH, NON-LASER: HCPCS

## 2021-06-15 PROCEDURE — 0JH606Z INSERTION OF PACEMAKER, DUAL CHAMBER INTO CHEST SUBCUTANEOUS TISSUE AND FASCIA, OPEN APPROACH: ICD-10-PCS | Performed by: INTERNAL MEDICINE

## 2021-06-15 PROCEDURE — 2709999900 HC NON-CHARGEABLE SUPPLY

## 2021-06-15 PROCEDURE — 33208 INSRT HEART PM ATRIAL & VENT: CPT | Performed by: INTERNAL MEDICINE

## 2021-06-15 PROCEDURE — 02H63JZ INSERTION OF PACEMAKER LEAD INTO RIGHT ATRIUM, PERCUTANEOUS APPROACH: ICD-10-PCS | Performed by: INTERNAL MEDICINE

## 2021-06-15 PROCEDURE — 85730 THROMBOPLASTIN TIME PARTIAL: CPT

## 2021-06-15 PROCEDURE — 2580000003 HC RX 258: Performed by: STUDENT IN AN ORGANIZED HEALTH CARE EDUCATION/TRAINING PROGRAM

## 2021-06-15 PROCEDURE — 6370000000 HC RX 637 (ALT 250 FOR IP): Performed by: HOSPITALIST

## 2021-06-15 PROCEDURE — 85027 COMPLETE CBC AUTOMATED: CPT

## 2021-06-15 PROCEDURE — 2500000003 HC RX 250 WO HCPCS

## 2021-06-15 PROCEDURE — 86901 BLOOD TYPING SEROLOGIC RH(D): CPT

## 2021-06-15 PROCEDURE — C1898 LEAD, PMKR, OTHER THAN TRANS: HCPCS

## 2021-06-15 PROCEDURE — 85610 PROTHROMBIN TIME: CPT

## 2021-06-15 PROCEDURE — C1785 PMKR, DUAL, RATE-RESP: HCPCS

## 2021-06-15 PROCEDURE — 6360000002 HC RX W HCPCS

## 2021-06-15 PROCEDURE — 02HK3JZ INSERTION OF PACEMAKER LEAD INTO RIGHT VENTRICLE, PERCUTANEOUS APPROACH: ICD-10-PCS | Performed by: INTERNAL MEDICINE

## 2021-06-15 PROCEDURE — 6370000000 HC RX 637 (ALT 250 FOR IP): Performed by: INTERNAL MEDICINE

## 2021-06-15 PROCEDURE — 36415 COLL VENOUS BLD VENIPUNCTURE: CPT

## 2021-06-15 PROCEDURE — 99232 SBSQ HOSP IP/OBS MODERATE 35: CPT | Performed by: INTERNAL MEDICINE

## 2021-06-15 PROCEDURE — 93005 ELECTROCARDIOGRAM TRACING: CPT | Performed by: INTERNAL MEDICINE

## 2021-06-15 PROCEDURE — 2140000000 HC CCU INTERMEDIATE R&B

## 2021-06-15 PROCEDURE — 6360000002 HC RX W HCPCS: Performed by: STUDENT IN AN ORGANIZED HEALTH CARE EDUCATION/TRAINING PROGRAM

## 2021-06-15 PROCEDURE — 86850 RBC ANTIBODY SCREEN: CPT

## 2021-06-15 RX ORDER — SODIUM CHLORIDE 9 MG/ML
25 INJECTION, SOLUTION INTRAVENOUS PRN
Status: DISCONTINUED | OUTPATIENT
Start: 2021-06-15 | End: 2021-06-16 | Stop reason: HOSPADM

## 2021-06-15 RX ORDER — SODIUM CHLORIDE 9 MG/ML
25 INJECTION, SOLUTION INTRAVENOUS PRN
Status: DISCONTINUED | OUTPATIENT
Start: 2021-06-15 | End: 2021-06-15 | Stop reason: SDUPTHER

## 2021-06-15 RX ORDER — SODIUM CHLORIDE 9 MG/ML
INJECTION, SOLUTION INTRAVENOUS CONTINUOUS
Status: DISCONTINUED | OUTPATIENT
Start: 2021-06-15 | End: 2021-06-16 | Stop reason: HOSPADM

## 2021-06-15 RX ORDER — SODIUM CHLORIDE 0.9 % (FLUSH) 0.9 %
10 SYRINGE (ML) INJECTION EVERY 12 HOURS SCHEDULED
Status: DISCONTINUED | OUTPATIENT
Start: 2021-06-15 | End: 2021-06-16 | Stop reason: HOSPADM

## 2021-06-15 RX ORDER — SODIUM CHLORIDE 0.9 % (FLUSH) 0.9 %
10 SYRINGE (ML) INJECTION PRN
Status: DISCONTINUED | OUTPATIENT
Start: 2021-06-15 | End: 2021-06-16 | Stop reason: HOSPADM

## 2021-06-15 RX ORDER — SODIUM CHLORIDE 0.9 % (FLUSH) 0.9 %
5-40 SYRINGE (ML) INJECTION PRN
Status: DISCONTINUED | OUTPATIENT
Start: 2021-06-15 | End: 2021-06-15 | Stop reason: SDUPTHER

## 2021-06-15 RX ADMIN — ATORVASTATIN CALCIUM 40 MG: 40 TABLET, FILM COATED ORAL at 20:01

## 2021-06-15 RX ADMIN — ISOSORBIDE MONONITRATE 15 MG: 30 TABLET ORAL at 10:37

## 2021-06-15 RX ADMIN — CEFAZOLIN 2000 MG: 10 INJECTION, POWDER, FOR SOLUTION INTRAVENOUS at 11:45

## 2021-06-15 RX ADMIN — SODIUM CHLORIDE: 9 INJECTION, SOLUTION INTRAVENOUS at 10:41

## 2021-06-15 RX ADMIN — ACETAMINOPHEN 650 MG: 325 TABLET ORAL at 20:03

## 2021-06-15 RX ADMIN — MEMANTINE HYDROCHLORIDE 10 MG: 10 TABLET, FILM COATED ORAL at 10:38

## 2021-06-15 RX ADMIN — FUROSEMIDE 20 MG: 20 TABLET ORAL at 10:38

## 2021-06-15 RX ADMIN — LORAZEPAM 0.5 MG: 0.5 TABLET ORAL at 10:38

## 2021-06-15 RX ADMIN — GABAPENTIN 300 MG: 300 CAPSULE ORAL at 20:01

## 2021-06-15 RX ADMIN — FERROUS SULFATE TAB 325 MG (65 MG ELEMENTAL FE) 325 MG: 325 (65 FE) TAB at 20:01

## 2021-06-15 RX ADMIN — Medication 1000 MCG: at 10:38

## 2021-06-15 RX ADMIN — LEVOTHYROXINE SODIUM 25 MCG: 0.03 TABLET ORAL at 04:07

## 2021-06-15 RX ADMIN — GABAPENTIN 300 MG: 300 CAPSULE ORAL at 10:37

## 2021-06-15 RX ADMIN — VANCOMYCIN HYDROCHLORIDE 250 MG: 1 INJECTION, POWDER, LYOPHILIZED, FOR SOLUTION INTRAVENOUS at 12:41

## 2021-06-15 RX ADMIN — MEMANTINE HYDROCHLORIDE 10 MG: 10 TABLET, FILM COATED ORAL at 20:01

## 2021-06-15 RX ADMIN — PANTOPRAZOLE SODIUM 40 MG: 40 TABLET, DELAYED RELEASE ORAL at 04:07

## 2021-06-15 RX ADMIN — CITALOPRAM 20 MG: 20 TABLET, FILM COATED ORAL at 10:38

## 2021-06-15 RX ADMIN — SODIUM CHLORIDE, PRESERVATIVE FREE 10 ML: 5 INJECTION INTRAVENOUS at 20:02

## 2021-06-15 RX ADMIN — FERROUS SULFATE TAB 325 MG (65 MG ELEMENTAL FE) 325 MG: 325 (65 FE) TAB at 10:37

## 2021-06-15 RX ADMIN — FOLIC ACID 1 MG: 1 TABLET ORAL at 10:37

## 2021-06-15 RX ADMIN — TRAZODONE HYDROCHLORIDE 100 MG: 100 TABLET ORAL at 22:54

## 2021-06-15 ASSESSMENT — PAIN DESCRIPTION - PAIN TYPE
TYPE: ACUTE PAIN;SURGICAL PAIN
TYPE: SURGICAL PAIN;ACUTE PAIN

## 2021-06-15 ASSESSMENT — PAIN DESCRIPTION - LOCATION
LOCATION: SHOULDER
LOCATION: SHOULDER

## 2021-06-15 ASSESSMENT — PAIN SCALES - GENERAL
PAINLEVEL_OUTOF10: 5
PAINLEVEL_OUTOF10: 0
PAINLEVEL_OUTOF10: 5

## 2021-06-15 ASSESSMENT — PAIN DESCRIPTION - DESCRIPTORS: DESCRIPTORS: ACHING

## 2021-06-15 ASSESSMENT — PAIN DESCRIPTION - ONSET: ONSET: ON-GOING

## 2021-06-15 ASSESSMENT — PAIN - FUNCTIONAL ASSESSMENT: PAIN_FUNCTIONAL_ASSESSMENT: PREVENTS OR INTERFERES SOME ACTIVE ACTIVITIES AND ADLS

## 2021-06-15 ASSESSMENT — PAIN DESCRIPTION - PROGRESSION: CLINICAL_PROGRESSION: GRADUALLY IMPROVING

## 2021-06-15 ASSESSMENT — PAIN DESCRIPTION - ORIENTATION
ORIENTATION: LEFT
ORIENTATION: LEFT

## 2021-06-15 ASSESSMENT — PAIN DESCRIPTION - FREQUENCY: FREQUENCY: INTERMITTENT

## 2021-06-15 ASSESSMENT — PAIN DESCRIPTION - DIRECTION: RADIATING_TOWARDS: NECK

## 2021-06-15 NOTE — BRIEF OP NOTE
Brief Postoperative Note    Date:   6/15/2021  Patient name: Earnestine Davila  YOB: 1960  Sex: female   MRN:   [id number]    PCP: Oneil Batista MD     Procedure: Dual Chamber pacemaker implantation    Pre-Op Diagnosis: Complete heart block with Junctional bradycardia    Post-Op Diagnosis: Same    Surgeon[facility] Willam Greene [facility], [facility], [facility], [facility]    Assistant: Randa Lowe. Anesthesia/sedation: Local lidocaine/fentanyl and midazolam as needed. Estimated Blood Loss (mL): Minimal    Complications: None    Findings:    Complete heart block with Junctional bradycardia    Recommendations:  See Epic orders. Bed rest for 2 hours. Keep wound dry. No shower for 7 days ((sponge bath and tub bath OK). Avoid arm sling. ICE packs locally. Monitor site for bleeding or swelling. Pressure dressing x 24 hours. Chest x-ray tomorrow morning PA and lateral views. Follow up in device clinic in 1 week.        Electronically signed by Jeffrey Rivera MD, Annabel Headley on 6/15/2021 at 2:12 PM

## 2021-06-15 NOTE — PROGRESS NOTES
Hospitalist Progress Note    Patient:  Ivett Schofield      Unit/Bed:3B-24/024-A    YOB: 1960    MRN: [id number]       Acct: [de-identified]     PCP: Petey Waldron MD    Date of Admission: 6/9/2021    Assessment/Plan:    1. Acute on blood loss anemia-questionable GI bleed-follows up with hematologist oncologist as outpatient-seen by GI planning for outpatient colonoscopy/EGD-hemoglobin stable between 9-9.5  2. Elevated troponin likely secondary to demand ischemia-had cardiac cath on 6/11-ACS ruled out has nonobstructive coronary disease, has severe MR-plan for MitraClip as outpatient as per interventional cardiologist.  3. Volume overload-not in CHF-check BMP Will add couple doses of Lasix starting today 6/12-monitor intake and output and daily weights, BMP a.m.  6/13-diuretics on hold because of RIVAS-BMP a.m.  6/14-oral diuretics resumed by cardiologist today-creatinine stable  4. Junctional rhythm and Bradycardia -EP cardiologist consulted- ? Pacer if needed after their evaluation. Currently off any rate controllers  6/13-seen by EP cardiologist-plan for pacemaker noted  6/14-plan for pacemaker tomorrow-we will check schedule. 6/15-plan for pacemaker today  5. ?  Metabolic encephalopathy however currently at baseline mentation-resolved  6. Chronic hypoxic respiratory failure-currently at baseline oxygen of 2 L    Disposition plan-pacemaker today, Home tomorrow    Chief Complaint: Anemia    Initial H&P and Hospital Course:     Patient is a poor historian. History obtained from patient and chart review. 57 y.o. female who presented to [facility] with complaints of fatigue x 3-4 weeks. PMHx significant for GERD and anxiety as per chart review. Patient was take to [facility] by her roommate who reports that patient has not been herself for the past 3-4 weeks and is always feeling tired. As per patient, she states that she was to have a \"scope\" because her blood counts have been low. Patient is not able to elaborate on this - unsure who her GI doctor is and which procedure she was to have done. Patient states her PCP sent her to [facility] due to her \"blood being low. \" Patient denies any hemoptysis, hematochezia, or hematuria. She denies any fevers, chills, chest pain, abdominal pain, N/V/D otherwise. She is always short of breath and chronically uses oxygen at home. Patient not able to tell me if she takes any blood thinners or NSAIDs.     Records from [facility] reviewed. Labs showed BMP with BUN/Cr 35/1.2 Ca 10.4. CBC showed H/H 7.5/22.5, MCV 99.9, WBC wnl. Troponin 0.25, 0.27. XR showed stable cardiomegaly, no evidence of CHF. Patient was given 2 units PRBCs at [facility] and transferred to [facility] for further evaluation. EKG en route via EMS showed junctional rhythm.        6/10/21: S/p 2 units of pRBC transfusion. Stable. EGD when stable.     Subjective (past 24 hours): Doing better  Anxious about the procedure today  No nausea no vomiting no chest pain no shortness of breath    Medications:  Reviewed    Infusion Medications    sodium chloride 100 mL/hr at 06/15/21 1041    sodium chloride      sodium chloride      dextrose       Scheduled Medications    sodium chloride flush  10 mL Intravenous 2 times per day    furosemide  20 mg Oral Daily    gabapentin  300 mg Oral TID    pantoprazole  40 mg Oral QAM AC    atorvastatin  40 mg Oral Daily    citalopram  20 mg Oral Daily    ferrous sulfate  325 mg Oral BID    folic acid  1 mg Oral Daily    isosorbide mononitrate  15 mg Oral Daily    levothyroxine  25 mcg Oral Daily    nicotine  1 patch Transdermal Q24H    traZODone  100 mg Oral Nightly    vitamin B-12  1,000 mcg Oral Daily    memantine  10 mg Oral BID    insulin lispro  0-6 Units Subcutaneous TID WC    insulin lispro  0-3 Units Subcutaneous Nightly    sodium chloride flush  5-40 mL Intravenous 2 times per day     PRN Meds: sodium chloride flush, sodium chloride, LORazepam, potassium chloride **OR** potassium alternative oral replacement **OR** potassium chloride, magnesium sulfate, potassium chloride, sodium chloride flush, sodium chloride, ondansetron **OR** ondansetron, polyethylene glycol, acetaminophen **OR** acetaminophen, glucose, dextrose, glucagon (rDNA), dextrose      Intake/Output Summary (Last 24 hours) at 6/15/2021 1439  Last data filed at 6/14/2021 1913  Gross per 24 hour   Intake 300 ml   Output --   Net 300 ml       Diet:  ADULT DIET; Regular; Low Fat/Low Chol/High Fiber/VIKTORIYA; Low Sodium (2 gm)    Exam:  BP (!) 122/56   Pulse 62   Temp 98.1 °F (36.7 °C) (Oral)   Resp 18   Wt 245 lb 12.8 oz (111.5 kg)   SpO2 98%   BMI 46.44 kg/m²     General appearance: Morbidly obese female. Pale appearance. No acute distress. HEENT:  Normal cephalic, atraumatic without obvious deformity. Pupils equal, round, and reactive to light. Extra ocular muscles intact. Conjunctivae/corneas clear. No pallor noted. Neck: Supple, with full range of motion. No jugular venous distention. Trachea midline. Respiratory: On NC, chronic oxygen use. Normal respiratory effort but getting short of breath with sentences. Decreased breath sounds at bilateral bases. Cardiovascular:  Bradycardia noted  Abdomen: Soft, non-tender, non-distended with normal bowel sounds. Musculoskeletal:  Edematous legs - patient reports chronic. Not pitting - likely secondary to body habitus  Skin: Pale appearance to skin  Neurologic:  Neurovascularly intact without any focal sensory/motor deficits.    Psychiatric:  Alert and oriented x3, somnolent   Peripheral Pulses: +2 palpable, equal bilaterally       Labs:   Recent Labs     06/13/21  0326 06/14/21  0248 06/15/21  0350   WBC  --   --  6.8   HGB 9.0* 8.6* 8.4*   HCT 31.6* 29.6* 29.5*   PLT  --   --  204     Recent Labs     06/13/21  0326 06/14/21  0248 06/15/21  0350    142 140   K 4.0 3.9 4.2    108 106   CO2 26 28 29   BUN 20 21 18   CREATININE 1.4* estimated at 55-60%. LV wall thickness is within normal limits. The right ventricular size appears normal with normal systolic function  and wall thickness. Right ventricular systolic pressure of 88-30 mm Hg consistent with mild  pulmonary hypertension. Mild aortic regurgitation is noted. The mitral valve was not well visualized. Possible Flail leaflet. Moderate mitral regurgitation is present. Consider THERON to further evaluate  Mitral valve if clinically indicated. Signature   ----------------------------------------------------------------  Electronically signed by Nicky Verdugo MD (Interpreting  physician) on 06/10/2021 at 04:34 PM  ----------------------------------------------------------------   Findings   Mitral Valve  The mitral valve was not well visualized. Possible Flail leaflet. Moderate mitral regurgitation is present. Consider THERON to further evaluate  Mitral valve if clinically indicated. Aortic Valve  The aortic valve appears trileaflet with normal thickness and leaflet  excursion. DOPPLER: Transaortic velocity was within the normal range with  no evidence of aortic stenosis. Mild aortic regurgitation is noted. Tricuspid Valve  The tricuspid valve structure is normal with normal leaflet separation. DOPPLER: There is no evidence of tricuspid stenosis. Mild tricuspid regurgitation visualized. Pulmonic Valve  The pulmonic valve was not well visualized . Left Atrium  Moderately dilated left atrium. Left Ventricle  Left ventricular size and systolic function is normal. Ejection fraction  was estimated at 55-60%. LV wall thickness is within normal limits. Right Atrium  Mildly enlarged right atrium size. Right Ventricle  The right ventricular size appears normal with normal systolic function  and wall thickness. Right ventricular systolic pressure of 60-03 mm Hg consistent with mild  pulmonary hypertension.    Pericardial Effusion  The pericardium appears normal with no evidence of a pericardial effusion. Pleural Effusion  No evidence of pleural effusion. Aorta / Great Vessels  -Aortic root dimension within normal limits. -IVC size is within normal limits with normal respiratory phasic changes. M-Mode/2D Measurements & Calculations   LV Diastolic   LV Systolic Dimension:    AV Cusp Separation: 1.7 cmLA  Dimension: 5.4 3.6 cm                    Dimension: 4.3 cmAO Root  cm             LV Volume Diastolic: 961  Dimension: 3.3 cmLA Area: 18.6  LV FS:33.3 %   ml                        cm^2  LV PW          LV Volume Systolic: 41.8  Diastolic: 1   ml  cm             LV EDV/LV EDV Index: 141  Septum         ml/69 m^2LV ESV/LV ESV    RV Diastolic Dimension: 2.9 cm  Diastolic: 1   Index: 62.4 ml/27 m^2  cm             EF Calculated: 61.4 %     LA/Aorta: 1.3                                           Ascending Aorta: 3 cm                                           LA volume/Index: 52.3 ml /26m^2  Doppler Measurements & Calculations   MV Peak E-Wave: 164 cm/s    AV Peak Velocity:  LVOT Peak Velocity: 108  MV Peak A-Wave: 40.2 cm/s   200 cm/s           cm/s  MV E/A Ratio: 4.08          AV Peak Gradient:  LVOT Mean Velocity: 67.9  MV Peak Gradient: 10.76     16 mmHg            cm/s  mmHg                        AV Mean Velocity:  LVOT Peak Gradient: 5  MV Mean Gradient: 3 mmHg    142 cm/s           mmHgLVOT Mean Gradient: 2  MV Mean Velocity: 69.9 cm/s AV Mean Gradient:  mmHg  MV Deceleration Time: 204   9 mmHg  msec                        AV VTI: 38.9 cm  MV P1/2t: 68 msec  MVA by PHT:3.24 cm^2                           TR Velocity:292 cm/s  MV Area (PISA): 0.3 cm^2    LVOT VTI: 20.2 cm  TR Gradient:34.11 mmHg  MV E' Septal Velocity: 5.7  AV P1/2t: 691 msec PV Peak Velocity: 73.7 cm/s  cm/s                        IVRT: 63 msec      PV Peak Gradient: 2.17 mmHg  MV A' Septal Velocity: 4.5  cm/s  MV E' Lateral Velocity: 7.7 AV DVI (VTI):  cm/s                        0. 52AV DVI  MV A' Lateral Velocity: 4.2 (Vmax):0.54  cm/s  E/E' septal: 28.77  E/E' lateral: 21.3  MR Velocity: 532 cm/s  MV JUMA PISA: 0.11 cm^2  MR VTI: 188 cm  Alias Velocity: 38.5  cm/sPISA Radius: 0.5 cm  MV ERO Volumetric: 0.3 cm^2  PISA area: 1.57 cm^2MR flow  rate: 60.44 ml/sMR  volume:20.68 ml  [id number]%3d%[id number]      Electronically signed by Ronn Baptiste MD on 6/15/2021 at 2:39 PM

## 2021-06-15 NOTE — PRE SEDATION
Sedation Pre-Procedure Note    Patient Name: Zac Velasco   YOB: 1960  Room/Bed: -24/024-A  Medical Record Number: [id number]  Date: 6/15/2021   Time: 7:29 AM       Indication:  Dual Chamber pacemaker implantation for symptomatic sinus bradycardia with slow junctional escape and isorhythmic AV dissociation. Consent: The risk and benefits of pacemaker implantation including pneumothorax, hemothorax, bleeding, vascular complications, infection, pericardial tamponade requiring emergency pericardiocentesis, lead dislodgement requiring reposition, future operations for generator change or device revision or upgrade were discussed with the patient. She verbalized understanding of the discussion. Her questions were answered. The patient wishes to proceed. Vital Signs:   Vitals:    06/15/21 0413   BP: 130/60   Pulse: 66   Resp: 17   Temp: 97.8 °F (36.6 °C)   SpO2: 95%       Past Medical History:   has a past medical history of Agoraphobia, Anemia, Anxiety, Asthma, CAD (coronary artery disease), COPD (chronic obstructive pulmonary disease) ([address] Dementia ([address] Depression, Diabetes mellitus ([address] GERD (gastroesophageal reflux disease), Hypertension, Neuropathy, PTSD (post-traumatic stress disorder), PVD (peripheral vascular disease) ([address] and Renal disease. Past Surgical History:   has a past surgical history that includes Upper gastrointestinal endoscopy; esophageal motility study (Left, 5/24/2019); hernia repair (2016); Cholecystectomy (2014); Tonsillectomy and adenoidectomy; Hand surgery (Right); Cardiac catheterization; Coronary angioplasty with stent (2019); Hysterectomy, total abdominal (1982); and Ovary removal (Bilateral, 1982).     Medications:   Scheduled Meds:    sodium chloride flush  10 mL Intravenous 2 times per day    ceFAZolin (ANCEF) IVPB  2,000 mg Intravenous On Call to OR    vancomycin irrigation  250 mg Irrigation Once    furosemide  20 mg Oral Daily    gabapentin  300 mg Oral TID    pantoprazole  40 mg Oral QAM AC    atorvastatin  40 mg Oral Daily    citalopram  20 mg Oral Daily    ferrous sulfate  325 mg Oral BID    folic acid  1 mg Oral Daily    isosorbide mononitrate  15 mg Oral Daily    levothyroxine  25 mcg Oral Daily    nicotine  1 patch Transdermal Q24H    traZODone  100 mg Oral Nightly    vitamin B-12  1,000 mcg Oral Daily    memantine  10 mg Oral BID    insulin lispro  0-6 Units Subcutaneous TID WC    insulin lispro  0-3 Units Subcutaneous Nightly    sodium chloride flush  5-40 mL Intravenous 2 times per day     Continuous Infusions:    sodium chloride      sodium chloride      sodium chloride      dextrose       PRN Meds: sodium chloride flush, sodium chloride, LORazepam, potassium chloride **OR** potassium alternative oral replacement **OR** potassium chloride, magnesium sulfate, potassium chloride, sodium chloride flush, sodium chloride, ondansetron **OR** ondansetron, polyethylene glycol, acetaminophen **OR** acetaminophen, glucose, dextrose, glucagon (rDNA), dextrose  Home Meds:   Prior to Admission medications    Medication Sig Start Date End Date Taking? Authorizing Provider   pantoprazole (PROTONIX) 40 MG tablet Take 1 tablet by mouth every morning (before breakfast) 6/13/21  Yes Bennett Bernal APRN - CNP   metoprolol succinate (TOPROL XL) 25 MG extended release tablet Take 12.5 mg by mouth daily   Yes Historical Provider, MD   nystatin (MYCOSTATIN) 231878 UNIT/GM powder Apply topically 2 times daily Apply topically 2 times daily.    Yes Historical Provider, MD   nicotine (NICODERM CQ) 14 MG/24HR Place 1 patch onto the skin every 24 hours   Yes Historical Provider, MD   linaclotide (LINZESS) 145 MCG capsule Take 145 mcg by mouth every morning (before breakfast)   Yes Historical Provider, MD   folic acid (FOLVITE) 1 MG tablet Take 1 mg by mouth daily   Yes Historical Provider, MD   vitamin B-12 (CYANOCOBALAMIN) 1000 MCG tablet Take 1,000 mcg by mouth daily   Yes Historical Provider, MD   ferrous sulfate (IRON 325) 325 (65 Fe) MG tablet Take 325 mg by mouth 2 times daily   Yes Historical Provider, MD   Exenatide ER (BYDUREON BCISE) 2 MG/0.85ML AUIJ Inject 0.85 mLs into the skin once a week   Yes Historical Provider, MD   traZODone (DESYREL) 100 MG tablet Take 100 mg by mouth nightly   Yes Historical Provider, MD   gabapentin (NEURONTIN) 400 MG capsule Take 800 mg by mouth 3 times daily. Yes Historical Provider, MD   citalopram (CELEXA) 20 MG tablet Take 20 mg by mouth daily   Yes Historical Provider, MD   isosorbide mononitrate (IMDUR) 30 MG extended release tablet Take 15 mg by mouth daily   Yes Historical Provider, MD   buPROPion (WELLBUTRIN XL) 300 MG extended release tablet Take 300 mg by mouth every morning   Yes Historical Provider, MD   memantine ER (NAMENDA XR) 28 MG CP24 extended release capsule Take 28 mg by mouth daily   Yes Historical Provider, MD   prasugrel (EFFIENT) 10 MG TABS Take 10 mg by mouth daily   Yes Historical Provider, MD   levothyroxine (SYNTHROID) 25 MCG tablet Take 25 mcg by mouth Daily   Yes Historical Provider, MD   atorvastatin (LIPITOR) 40 MG tablet Take 40 mg by mouth daily   Yes Historical Provider, MD   bismuth subsalicylate (PEPTO BISMOL) 262 MG/15ML suspension Take 15 mLs by mouth every 6 hours as needed for Indigestion   Yes Historical Provider, MD   aspirin 81 MG chewable tablet Take 81 mg by mouth daily   Yes Historical Provider, MD     Coumadin Use Last 7 Days:  no  Antiplatelet drug therapy use last 7 days: yes - asa  Other anticoagulant use last 7 days: no  Additional Medication Information:  Per medical records. Pre-Sedation Documentation and Exam:   I have personally completed a history, physical exam & review of systems for this patient (see notes).     Mallampati Airway Assessment:  Mallampati Class III - (soft palate & base of uvula are visible)    Prior History of Anesthesia Complications:   none    ASA Classification:  Class 3 - A patient with severe systemic disease that limits activity but is not incapacitating    Sedation/ Anesthesia Plan:   intravenous sedation    Medications Planned:   midazolam (Versed) intravenously and fentanyl intravenously    Patient is an appropriate candidate for plan of sedation: yes    Electronically signed by Ruthann Menezes MD on 6/15/2021 at 7:29 AM

## 2021-06-15 NOTE — CARE COORDINATION
6/15/21, 1:35 PM EDT    DISCHARGE ON GOING [address] day: 6  Location: -24/024-A Reason for admit: GI bleed [K92.2]   Procedure:   6/11 Cardiac cath - Severe mitral regurgitation; no significant coronary artery  disease; elevated right heart cath pressures with preserved cardiac  Output. 6/15 Pacemaker placement by Dr. Vick Romano  Barriers to Discharge: Hospitalist, Cardiology and EP following. GI signed off, plan to work up as OP for EGD/Colonoscopy. Lipitor, Celexa, Iron tabs, Folvite, Lasix po daily, Neurontin tid, Imdur, Synthroid, Namenda, Nicotine patch, Protonix, Klor-Con, Desyrel and Vit B12. Electrolyte replacements prn. Diabetes management. Pacer today, planning discharge tomorrow if medically stable. PCP: Vesna Jovel MD  Readmission Risk Score: 13%  Patient Goals/Plan/Treatment Preferences: Plans home with her caregiver. Will need assistance with transportation. Denies any other needs.

## 2021-06-15 NOTE — PROCEDURES
leads were checked using external analyzing system and noted to be adequate. High output pacing with 10 volts was performed with both the leads and there was no phrenic nerve capture. The leads were secured to the pectoral fascia using 0 Ethibond over the suture sleeves. The pocket was thoroughly irrigated with antibiotic solution. The leads were attached to the pulse generator and lead parameters were tested one more time. The leads were wrapped around the device and the device was placed inside the pocket. The pulse generator was secured with pectoralis muscle. The pocket was closed in 3 layers using, 2-0, 3-0 and 4-0 Vicryl sutures. The incision was covered by steri-strips and site covered with by a sterile pressure dressing. The fluoroscopy of the chest showed no pneumothorax or pericardial or pleural effusion. The patient's hemodynamics were monitored throughout the procedure. She tolerated the procedure well. Medications:  1. Midazolam 1 mg intravenously. 2. Fentanyl 100 mcg intravenously. 3. Cefazolin 2 gm intravenously for surgical prophylaxis. 4. Lidocaine/Marcaine 30 cc for local anaesthesia. Fluoroscopy time:  3 minutes. Blood loss:  Minimal.     Complication:  None     Implanted device:  1. Pulse generator: Medtronic, model [id number] and serial [id number]. 2. Right atrial lead:  Medtronic, model [id number] and serial [id number].  3. Right ventricular lead: Medtronic, model [id number] and serial [id number]. Intraoperative device parameters:  1. RA lead: 0.4mV (retrograde P), [email or web address], and 461 ohms. 2. RV lead: 10 mV, [email or web address] and 646 ohms. Bradycardia settings:  DDDR  beats. Summary:  1. Successful dual chamber pacemaker implantation. 2. Fluoroscopy of the leads. 3. Normal pacemaker functions. Recommendations   1. Observation for 2 hours. 2. Chest x-ray (2 views) in 2 hours. 3. Post operative care per protocol.        Electronically signed by Etta Wang MD, Sam Conley on 6/15/2021 at 2:27 PM

## 2021-06-16 ENCOUNTER — APPOINTMENT (OUTPATIENT)
Dept: GENERAL RADIOLOGY | Age: 61
DRG: 242 | End: 2021-06-16
Attending: FAMILY MEDICINE
Payer: MEDICARE

## 2021-06-16 VITALS
RESPIRATION RATE: 18 BRPM | SYSTOLIC BLOOD PRESSURE: 116 MMHG | OXYGEN SATURATION: 99 % | DIASTOLIC BLOOD PRESSURE: 57 MMHG | BODY MASS INDEX: 46.44 KG/M2 | HEART RATE: 62 BPM | WEIGHT: 245.8 LBS | TEMPERATURE: 98.8 F

## 2021-06-16 LAB
ANION GAP SERPL CALCULATED.3IONS-SCNC: 8 MEQ/L (ref 8–16)
BUN BLDV-MCNC: 17 MG/DL (ref 7–22)
CALCIUM SERPL-MCNC: 8.7 MG/DL (ref 8.5–10.5)
CHLORIDE BLD-SCNC: 103 MEQ/L (ref 98–111)
CO2: 28 MEQ/L (ref 23–33)
CREAT SERPL-MCNC: 1 MG/DL (ref 0.4–1.2)
EKG ATRIAL RATE: 60 BPM
EKG P AXIS: 55 DEGREES
EKG P-R INTERVAL: 176 MS
EKG Q-T INTERVAL: 394 MS
EKG Q-T INTERVAL: 498 MS
EKG QRS DURATION: 172 MS
EKG QRS DURATION: 98 MS
EKG QTC CALCULATION (BAZETT): 386 MS
EKG QTC CALCULATION (BAZETT): 498 MS
EKG R AXIS: 70 DEGREES
EKG R AXIS: 86 DEGREES
EKG T AXIS: -135 DEGREES
EKG T AXIS: -98 DEGREES
EKG VENTRICULAR RATE: 58 BPM
EKG VENTRICULAR RATE: 60 BPM
GFR SERPL CREATININE-BSD FRML MDRD: 56 ML/MIN/1.73M2
GLUCOSE BLD-MCNC: 106 MG/DL (ref 70–108)
GLUCOSE BLD-MCNC: 127 MG/DL (ref 70–108)
GLUCOSE BLD-MCNC: 200 MG/DL (ref 70–108)
POTASSIUM REFLEX MAGNESIUM: 4 MEQ/L (ref 3.5–5.2)
SODIUM BLD-SCNC: 139 MEQ/L (ref 135–145)

## 2021-06-16 PROCEDURE — 2580000003 HC RX 258: Performed by: STUDENT IN AN ORGANIZED HEALTH CARE EDUCATION/TRAINING PROGRAM

## 2021-06-16 PROCEDURE — 6370000000 HC RX 637 (ALT 250 FOR IP): Performed by: STUDENT IN AN ORGANIZED HEALTH CARE EDUCATION/TRAINING PROGRAM

## 2021-06-16 PROCEDURE — 6370000000 HC RX 637 (ALT 250 FOR IP): Performed by: HOSPITALIST

## 2021-06-16 PROCEDURE — 80048 BASIC METABOLIC PNL TOTAL CA: CPT

## 2021-06-16 PROCEDURE — 82948 REAGENT STRIP/BLOOD GLUCOSE: CPT

## 2021-06-16 PROCEDURE — 93010 ELECTROCARDIOGRAM REPORT: CPT | Performed by: INTERNAL MEDICINE

## 2021-06-16 PROCEDURE — 36415 COLL VENOUS BLD VENIPUNCTURE: CPT

## 2021-06-16 PROCEDURE — 71046 X-RAY EXAM CHEST 2 VIEWS: CPT

## 2021-06-16 PROCEDURE — 99024 POSTOP FOLLOW-UP VISIT: CPT | Performed by: NURSE PRACTITIONER

## 2021-06-16 PROCEDURE — 6370000000 HC RX 637 (ALT 250 FOR IP): Performed by: NURSE PRACTITIONER

## 2021-06-16 PROCEDURE — 99239 HOSP IP/OBS DSCHRG MGMT >30: CPT | Performed by: INTERNAL MEDICINE

## 2021-06-16 PROCEDURE — 99221 1ST HOSP IP/OBS SF/LOW 40: CPT | Performed by: PHYSICIAN ASSISTANT

## 2021-06-16 RX ORDER — UBIQUINOL 100 MG
CAPSULE ORAL
Qty: 100 EACH | Refills: 11 | Status: SHIPPED | OUTPATIENT
Start: 2021-06-16

## 2021-06-16 RX ORDER — GLUCOSAMINE HCL/CHONDROITIN SU 500-400 MG
CAPSULE ORAL
Qty: 100 STRIP | Refills: 0 | OUTPATIENT
Start: 2021-06-16

## 2021-06-16 RX ORDER — FUROSEMIDE 20 MG/1
20 TABLET ORAL DAILY
Qty: 60 TABLET | Refills: 3 | Status: SHIPPED | OUTPATIENT
Start: 2021-06-16 | End: 2022-01-13

## 2021-06-16 RX ORDER — BLOOD-GLUCOSE METER
1 KIT MISCELLANEOUS DAILY
Qty: 1 KIT | Refills: 0 | Status: SHIPPED | OUTPATIENT
Start: 2021-06-16 | End: 2021-06-16 | Stop reason: SDUPTHER

## 2021-06-16 RX ORDER — PRASUGREL 10 MG/1
10 TABLET, FILM COATED ORAL DAILY
Qty: 30 TABLET | Refills: 1 | Status: SHIPPED | OUTPATIENT
Start: 2021-06-20 | End: 2022-01-13 | Stop reason: ALTCHOICE

## 2021-06-16 RX ORDER — BLOOD-GLUCOSE METER
1 KIT MISCELLANEOUS DAILY
Qty: 1 KIT | Refills: 0 | OUTPATIENT
Start: 2021-06-16

## 2021-06-16 RX ORDER — GLUCOSAMINE HCL/CHONDROITIN SU 500-400 MG
CAPSULE ORAL
Qty: 100 STRIP | Refills: 0 | Status: SHIPPED | OUTPATIENT
Start: 2021-06-16 | End: 2021-06-16 | Stop reason: SDUPTHER

## 2021-06-16 RX ADMIN — FUROSEMIDE 20 MG: 20 TABLET ORAL at 10:31

## 2021-06-16 RX ADMIN — FOLIC ACID 1 MG: 1 TABLET ORAL at 10:30

## 2021-06-16 RX ADMIN — SODIUM CHLORIDE, PRESERVATIVE FREE 10 ML: 5 INJECTION INTRAVENOUS at 10:30

## 2021-06-16 RX ADMIN — PANTOPRAZOLE SODIUM 40 MG: 40 TABLET, DELAYED RELEASE ORAL at 06:08

## 2021-06-16 RX ADMIN — LEVOTHYROXINE SODIUM 25 MCG: 0.03 TABLET ORAL at 06:05

## 2021-06-16 RX ADMIN — ACETAMINOPHEN 650 MG: 325 TABLET ORAL at 06:06

## 2021-06-16 RX ADMIN — CITALOPRAM 20 MG: 20 TABLET, FILM COATED ORAL at 10:30

## 2021-06-16 RX ADMIN — MEMANTINE HYDROCHLORIDE 10 MG: 10 TABLET, FILM COATED ORAL at 10:30

## 2021-06-16 RX ADMIN — ISOSORBIDE MONONITRATE 15 MG: 30 TABLET ORAL at 10:31

## 2021-06-16 RX ADMIN — GABAPENTIN 300 MG: 300 CAPSULE ORAL at 10:31

## 2021-06-16 RX ADMIN — FERROUS SULFATE TAB 325 MG (65 MG ELEMENTAL FE) 325 MG: 325 (65 FE) TAB at 10:30

## 2021-06-16 RX ADMIN — Medication 1000 MCG: at 10:30

## 2021-06-16 ASSESSMENT — PAIN DESCRIPTION - LOCATION: LOCATION: SHOULDER

## 2021-06-16 ASSESSMENT — PAIN SCALES - GENERAL: PAINLEVEL_OUTOF10: 5

## 2021-06-16 ASSESSMENT — PAIN DESCRIPTION - PAIN TYPE: TYPE: ACUTE PAIN;SURGICAL PAIN

## 2021-06-16 NOTE — PROGRESS NOTES
Cardiology Progress Note      Patient:  Konrad Kim  YOB: 1960  MRN: [id number]   Acct: [de-identified]  Admit Date:  6/9/2021  Primary Cardiologist: Dr. Kevin Silva, seen by Dr. Daysi Luong    Per Dr. Sterling Jarvis consult note:   Reason for Consultation:  Bradycardia, GI bleeding        History Of Present Illness:    57 y.o. pleasant female c hx of CAD s/p PCI in 2/20,  DM , who was transferred from [facility].  She was taken by her room mate to [facility] due to feeling not well and tiredness. She has a hx of Anemia, Asthma, COPD, Dementia, Depression, DM, HTN, PVD and Stage 3 CKD. She was apparently scheduled for colonoscopy next week to see where she is loosing blood. H/H 9.8/32, in the past it used to 14/43 in 2017. Patient at present denies any chest pain, sob, palpitations. EKG shows junctional rhythm at HR 46 bpm, NSST. Troponins were mildly at 0.115. Not on any AV juwan blocking agents. Patient is a poor historian and most of the info was obtained from outside records. Per Dr. Joanne Black consult note:   REASON FOR CONSULTATION:  Junctional bradycardia, evaluation for pacemaker implantation.       HISTORY OF PRESENT ILLNESS:  61/F was noted to be acting unusual at her home by her caretaker who lives with her. She was escorted to a local emergency room where she was noted to have junctional bradycardia and was transferred here for further management. The patient is known to have chronic anemia since her early childhood on iron supplements and is currently being evaluated for possible GI bleed. EP service was consulted for evaluation for pacemaker implantation.     The patient spends most of the time in the bed as she has had several falls because of weakness both lower extremities. She does report intermittent lightheadedness but denies presyncope/syncope, chest pain, shortness of breath or lower extremity swelling. She does report fatigue when she tries to walk with the help of a walker. The patient's routine laboratory work-up was significant for anemia and received 2 units of blood transfusion, current hemoglobin 9.5. Echocardiogram showed normal left ventricle size and function and moderate mitral regurgitation. Coronary angiogram showed nonobstructive coronary artery disease with patent stents in proximal LAD. Medcial hx: CAD/PCI-LAD (2/20), preserved LVEF (55%),  HTN, DM, CKD-III, PVD, anemia of chronic illness, depression and dementia.       Subjective (Events in last 24 hours): F/U PPM, POD #1. Resting in bed in nad on baseline home O2 . Has some incisional discomfort. Denies chest pain or sob. Tele with paced rhythm. Left upper chest incision with steri-strips intact, well approximated, mild echymosis, no drainage/bleeding/swelling. Objective:   BP (!) 128/59   Pulse 69   Temp 98.2 °F (36.8 °C) (Oral)   Resp 17   Wt 245 lb 12.8 oz (111.5 kg)   SpO2 95%   BMI 46.44 kg/m²        TELEMETRY: Paced    Physical Exam:  General Appearance: alert and oriented to person, place and time, in no acute distress  Cardiovascular: normal rate, regular rhythm, normal S1 and S2, no murmurs, rubs, clicks, or gallops, distal pulses intact, no carotid bruits, no JVD  Pulmonary/Chest: clear to auscultation bilaterally- no wheezes, rales or rhonchi, normal air movement, no respiratory distress  Left upper chest incision with steri-strips intact, well approximated, mild ecchymosis, no drainage/bleeding/swelling.   Abdomen: soft, non-tender, non-distended, normal bowel sounds, no masses   Extremities: no cyanosis or clubbing, BLE edema, pulses palpable  Skin: warm and dry, no rash or erythema  Head: normocephalic and atraumatic  Eyes: pupils equal, round, and reactive to light  Neck: supple and non-tender without mass, no thyromegaly   Musculoskeletal: normal range of motion, no joint swelling, deformity or tenderness  Neurological: alert, oriented, normal speech, no focal findings or movement disorder patient and informed   consent was obtained. Ejection fraction was estimated at 50-55%. Left atrial size was severely dilated with no thrombus identified. APPENDAGE: The left atrial appendage size was normal with no thrombus   identified. DOPPLER: The function was normal (normal emptying velocity). ATRIAL SEPTUM: Small PFO identified by color and bubble study. The mitral valve structure was degenerated with restriction of the   posterior leaflet. DOPPLER: The transmitral velocity was within the normal   range with no evidence for mitral stenosis. There was severe mitral   regurgitation (mean gradient 2 mmHg). Mild tricuspid regurgitation. Signature      ----------------------------------------------------------------   Electronically signed by Page Christopher MD        Left Heart Cath: 6/13/21  RIGHT HEART CATHETERIZATION:  RA 26, RV 62/22, PA 67/30 with a mean of  41, wedge pressure 32 along with PA saturation of 64%.     CORONARY ANGIOGRAM:  LEFT MAIN:  Mild luminal irregularities but patent without any  significant obstruction.     LAD:  30% stenosis proximally and with a patent stent to the proximal  segment, distally has luminal irregularities with no significant  obstruction. Small diagonal branch without any significant obstruction.     LCX:  Patent in the proximal segment. No significant obstruction. Bifurcates into a small AV groove branch and a larger OM1 branch without  any significant obstruction.     RCA:  Luminal irregularities throughout without any significant  obstruction. Bifurcates into a very small PLB and larger PDA. RCA is  dominant.     LV:  LV systolic pressure is 772. No significant LV to AO systolic  gradient. LVEDP is 30. LVEF is 45 to 50%. Mild to moderate dilation. Aortic valve is tricuspid. No significant aneurysm or dissection of  the visualized portion of the aorta.   There is 4+ mitral regurgitation  with reflux into the pulmonary veins.     IMMEDIATE COMPLICATIONS:  None.     MEDICATIONS:  See EMR.     ACCESS:  6-Arabic Angio-Seal was used for hemostasis.     ESTIMATED BLOOD LOSS:  Less than 10 mL.     SUMMARY:  Severe mitral regurgitation; no significant coronary artery  disease; elevated right heart cath pressures with preserved cardiac  output.     PLAN:  1. Bedrest.  2.  Optimal medical therapy. 3.  Risk factor management. 4.  Routine access site care. 5.  Guideline-directed therapy for acute heart failure. 6.  Uptitrate diuretics. 8.  Holding mitral valve workup as outpatient. 9.  CT Surgery consultation. 10.  Follow up with myself in two to four weeks postprocedure.     All the above was explained to the patient and the patient's family. They are agreeable and amenable to the above plan.           Juan Alfaro MD    Lab Data:    Cardiac Enzymes:  No results for input(s): CKTOTAL, CKMB, CKMBINDEX, TROPONINI in the last 72 hours.     CBC:   Lab Results   Component Value Date    WBC 6.8 06/15/2021    RBC 2.75 06/15/2021    RBC 4.52 12/27/2017    HGB 8.4 06/15/2021    HCT 29.5 06/15/2021     06/15/2021       CMP:    Lab Results   Component Value Date     06/16/2021    K 4.0 06/16/2021     06/16/2021    CO2 28 06/16/2021    BUN 17 06/16/2021    CREATININE 1.0 06/16/2021    AGRATIO 1.1 12/27/2017    LABGLOM 56 06/16/2021    GLUCOSE 106 06/16/2021    GLUCOSE 107 12/27/2017    CALCIUM 8.7 06/16/2021       Hepatic Function Panel:    Lab Results   Component Value Date    ALKPHOS 91 06/09/2021    ALT 18 06/09/2021    AST 21 06/09/2021    PROT 6.7 06/09/2021    BILITOT 1.2 06/09/2021    BILITOT 0.7 12/27/2017    BILIDIR 0.2 12/27/2017    LABALBU 3.9 06/09/2021       Magnesium:    Lab Results   Component Value Date    MG 1.8 06/12/2021       PT/INR:    Lab Results   Component Value Date    INR 1.02 06/15/2021       HgBA1c:    Lab Results   Component Value Date    LABA1C 6.3 12/27/2017       FLP:    Lab Results   Component Value Date

## 2021-06-16 NOTE — DISCHARGE SUMMARY
Hospitalist Progress Note    Patient:  Merry Ashford      Unit/Bed:3B-24/024-A    YOB: 1960    MRN: [id number]       Acct: [de-identified]     PCP: Mandy Nieto MD    Date of Admission: 6/9/2021    Assessment/Plan:    1. Acute on blood loss anemia-questionable GI bleed-follows up with hematologist oncologist as outpatient-seen by GI planning for outpatient colonoscopy/EGD-hemoglobin stable between 9-9.5  6/16-hemoglobin around 8.5-will resume prasugrel next week hold aspirin on hold, GI follow-up as outpatient for colonoscopy and endoscopy  2. Elevated troponin likely secondary to demand ischemia-had cardiac cath on 6/11-ACS ruled out has nonobstructive coronary disease, has severe MR-plan for MitraClip as outpatient as per interventional cardiologist.  3. Volume overload-not in CHF-check BMP Will add couple doses of Lasix starting today 6/12-monitor intake and output and daily weights, BMP a.m.  6/13-diuretics on hold because of RIVAS-BMP a.m.  6/14-oral diuretics resumed by cardiologist today-creatinine stable-we'll continue this at discharge  4. Junctional rhythm and Bradycardia -EP cardiologist consulted- ? Pacer if needed after their evaluation. Currently off any rate controllers  6/13-seen by EP cardiologist-plan for pacemaker noted  6/14-plan for pacemaker tomorrow-we will check schedule. 6/15-plan for pacemaker today  6/16-did well overnight minimal pain over the pacemaker site-seen by cardiologist rajwinder for discharge  5. ?  Metabolic encephalopathy however currently at baseline mentation-resolved  6.  Chronic hypoxic respiratory failure-currently at baseline oxygen of 2 L    Disposition plan-stable for discharge today  Seen by cardiology okay for discharge  PCP clinic in 1 week  GI clinic appointment as scheduled for outpatient endoscopy and colonoscopy  Will follow up with her cardiologist in 3 to 4 weeks time  MitraClip appointment as outpatient with interventional cardiologist  Discharge time 35 minutes    Chief Complaint: Anemia    Initial H&P and Hospital Course:     Patient is a poor historian. History obtained from patient and chart review. 57 y.o. female who presented to [facility] with complaints of fatigue x 3-4 weeks. PMHx significant for GERD and anxiety as per chart review. Patient was take to [facility] by her roommate who reports that patient has not been herself for the past 3-4 weeks and is always feeling tired. As per patient, she states that she was to have a \"scope\" because her blood counts have been low. Patient is not able to elaborate on this - unsure who her GI doctor is and which procedure she was to have done. Patient states her PCP sent her to [facility] due to her \"blood being low. \" Patient denies any hemoptysis, hematochezia, or hematuria. She denies any fevers, chills, chest pain, abdominal pain, N/V/D otherwise. She is always short of breath and chronically uses oxygen at home. Patient not able to tell me if she takes any blood thinners or NSAIDs.     Records from [facility] reviewed. Labs showed BMP with BUN/Cr 35/1.2 Ca 10.4. CBC showed H/H 7.5/22.5, MCV 99.9, WBC wnl. Troponin 0.25, 0.27. XR showed stable cardiomegaly, no evidence of CHF. Patient was given 2 units PRBCs at [facility] and transferred to [facility] for further evaluation. EKG en route via EMS showed junctional rhythm.        6/10/21: S/p 2 units of pRBC transfusion. Stable. EGD when stable. Medication List      START taking these medications    Alcohol Prep 70 % Pads  Us e3 imes per day Diagnosis:  Diabetes Non-Insulin Dependent     blood glucose test strips  Test 3 times a day & as needed for symptoms of irregular blood glucose. Dispense sufficient amount for indicated testing frequency plus additional to accommodate PRN testing needs.      furosemide 20 MG tablet  Commonly known as: LASIX  Take 1 tablet by mouth daily     glucose monitoring kit monitoring kit  1 kit by MCG/ACT Aero  Commonly known as: SYMBICORT     PROAIR HFA IN           Where to Get Your Medications      These medications were sent to [facility] , [address] [address]    Phone: [phone]   · Alcohol Prep 70 % Pads  · blood glucose test strips  · furosemide 20 MG tablet  · glucose monitoring kit monitoring kit  · metFORMIN 500 MG tablet  · prasugrel 10 MG Tabs     You can get these medications from any pharmacy    Bring a paper prescription for each of these medications  · pantoprazole 40 MG tablet         Intake/Output Summary (Last 24 hours) at 6/16/2021 1411  Last data filed at 6/16/2021 1358  Gross per 24 hour   Intake 420 ml   Output 500 ml   Net -80 ml       Diet:  ADULT DIET; Regular; Low Fat/Low Chol/High Fiber/VIKTORIYA; Low Sodium (2 gm)    Exam:  BP (!) 116/57   Pulse 62   Temp 98.8 °F (37.1 °C) (Oral)   Resp 18   Wt 245 lb 12.8 oz (111.5 kg)   SpO2 99%   BMI 46.44 kg/m²     General appearance: Morbidly obese female. Pale appearance. No acute distress. HEENT:  Normal cephalic, atraumatic without obvious deformity. Pupils equal, round, and reactive to light. Extra ocular muscles intact. Conjunctivae/corneas clear. No pallor noted. Neck: Supple, with full range of motion. No jugular venous distention. Trachea midline. Respiratory: On NC, chronic oxygen use. Normal respiratory effort but getting short of breath with sentences. Decreased breath sounds at bilateral bases. Cardiovascular:  Bradycardia noted  Abdomen: Soft, non-tender, non-distended with normal bowel sounds. Musculoskeletal:  Edematous legs - patient reports chronic. Not pitting - likely secondary to body habitus  Skin: Pale appearance to skin  Neurologic:  Neurovascularly intact without any focal sensory/motor deficits.    Psychiatric:  Alert and oriented x3, somnolent   Peripheral Pulses: +2 palpable, equal bilaterally       Labs:   Recent Labs     06/14/21  0248 06/15/21  0350   WBC  --  6.8   HGB 8.6* 8.4*   HCT 29.6* 29.5*   PLT  --  204     Recent Labs     06/14/21  0248 06/15/21  0350 06/16/21  0430    140 139   K 3.9 4.2 4.0    106 103   CO2 28 29 28   BUN 21 18 17   CREATININE 0.8 1.0 1.0   CALCIUM 8.5 8.7 8.7     No results for input(s): AST, ALT, BILIDIR, BILITOT, ALKPHOS in the last 72 hours. Recent Labs     06/15/21  0350   INR 1.02     No results for input(s): Janeece Gambles in the last 72 hours. No results for input(s): PROCAL in the last 72 hours. Microbiology:      Urinalysis:      Lab Results   Component Value Date    NITRU NEGATIVE 06/09/2021    WBCUA 3-5 12/27/2017    BACTERIA 2+ 12/27/2017    RBCUA 0-2 12/27/2017    BLOODU NEGATIVE 06/09/2021    SPECGRAV 1.025 12/27/2017    GLUCOSEU NEGATIVE 06/09/2021       Radiology:  ECHO Complete 2D W Doppler W Color    Result Date: 6/10/2021  Transthoracic Echocardiography Report (TTE)  Demographics   Patient Name   Ab Howard  Gender              Other                 JIMI   MR #           [id number]      Race                                                 Ethnicity   Account #      [de-identified]      Room Number         0019   Accession      [id number]     Date of Study       06/10/2021  Number   Date of Birth  1960     Referring Physician Elizabeth Gra MD   Age            57 year(s)     Melly Best RADHA                                 Interpreting        Moshe Khan MD                                Physician  Procedure Type of Study   TTE procedure:ECHOCARDIOGRAM COMPLETE 2D W DOPPLER W COLOR. Procedure Date Date: 06/10/2021 Start: 01:24 PM Study Location: Bedside Technical Quality: Adequate visualization Indications:Elevated troponin. Additional Medical History: Former smoker, anemia, asthma, COPD, hypertension, diabetes, coronary artery disease, peripheral vascular disease Patient Status: Routine Height: 61 inches Weight: 236.01 pounds BSA: 2.03 m^2 BMI: 44.59 kg/m^2 BP: 136/74 mmHg  Conclusions   Summary  Technically difficult examination. Left ventricular size and systolic function is normal. Ejection fraction  was estimated at 55-60%. LV wall thickness is within normal limits. The right ventricular size appears normal with normal systolic function  and wall thickness. Right ventricular systolic pressure of 41-03 mm Hg consistent with mild  pulmonary hypertension. Mild aortic regurgitation is noted. The mitral valve was not well visualized. Possible Flail leaflet. Moderate mitral regurgitation is present. Consider THERON to further evaluate  Mitral valve if clinically indicated. Signature   ----------------------------------------------------------------  Electronically signed by Nicky Verdugo MD (Interpreting  physician) on 06/10/2021 at 04:34 PM  ----------------------------------------------------------------   Findings   Mitral Valve  The mitral valve was not well visualized. Possible Flail leaflet. Moderate mitral regurgitation is present. Consider THERON to further evaluate  Mitral valve if clinically indicated. Aortic Valve  The aortic valve appears trileaflet with normal thickness and leaflet  excursion. DOPPLER: Transaortic velocity was within the normal range with  no evidence of aortic stenosis. Mild aortic regurgitation is noted. Tricuspid Valve  The tricuspid valve structure is normal with normal leaflet separation. DOPPLER: There is no evidence of tricuspid stenosis. Mild tricuspid regurgitation visualized. Pulmonic Valve  The pulmonic valve was not well visualized . Left Atrium  Moderately dilated left atrium. Left Ventricle  Left ventricular size and systolic function is normal. Ejection fraction  was estimated at 55-60%. LV wall thickness is within normal limits.    Right Atrium  Mildly enlarged right atrium size. Right Ventricle  The right ventricular size appears normal with normal systolic function  and wall thickness. Right ventricular systolic pressure of 84-12 mm Hg consistent with mild  pulmonary hypertension. Pericardial Effusion  The pericardium appears normal with no evidence of a pericardial effusion. Pleural Effusion  No evidence of pleural effusion. Aorta / Great Vessels  -Aortic root dimension within normal limits. -IVC size is within normal limits with normal respiratory phasic changes.   M-Mode/2D Measurements & Calculations   LV Diastolic   LV Systolic Dimension:    AV Cusp Separation: 1.7 cmLA  Dimension: 5.4 3.6 cm                    Dimension: 4.3 cmAO Root  cm             LV Volume Diastolic: 949  Dimension: 3.3 cmLA Area: 18.6  LV FS:33.3 %   ml                        cm^2  LV PW          LV Volume Systolic: 27.0  Diastolic: 1   ml  cm             LV EDV/LV EDV Index: 141  Septum         ml/69 m^2LV ESV/LV ESV    RV Diastolic Dimension: 2.9 cm  Diastolic: 1   Index: 68.9 ml/27 m^2  cm             EF Calculated: 61.4 %     LA/Aorta: 1.3                                           Ascending Aorta: 3 cm                                           LA volume/Index: 52.3 ml /26m^2  Doppler Measurements & Calculations   MV Peak E-Wave: 164 cm/s    AV Peak Velocity:  LVOT Peak Velocity: 108  MV Peak A-Wave: 40.2 cm/s   200 cm/s           cm/s  MV E/A Ratio: 4.08          AV Peak Gradient:  LVOT Mean Velocity: 67.9  MV Peak Gradient: 10.76     16 mmHg            cm/s  mmHg                        AV Mean Velocity:  LVOT Peak Gradient: 5  MV Mean Gradient: 3 mmHg    142 cm/s           mmHgLVOT Mean Gradient: 2  MV Mean Velocity: 69.9 cm/s AV Mean Gradient:  mmHg  MV Deceleration Time: 204   9 mmHg  msec                        AV VTI: 38.9 cm  MV P1/2t: 68 msec  MVA by PHT:3.24 cm^2                           TR Velocity:292 cm/s  MV Area (PISA): 0.3 cm^2    LVOT VTI: 20.2 cm  TR Gradient:34.11 mmHg  MV E' Septal Velocity: 5.7  AV P1/2t: 691 msec PV Peak Velocity: 73.7 cm/s  cm/s                        IVRT: 63 msec      PV Peak Gradient: 2.17 mmHg  MV A' Septal Velocity: 4.5  cm/s  MV E' Lateral Velocity: 7.7 AV DVI (VTI):  cm/s                        0. 52AV DVI  MV A' Lateral Velocity: 4.2 (Vmax):0.54  cm/s  E/E' septal: 28.77  E/E' lateral: 21.3  MR Velocity: 532 cm/s  MV JUMA PISA: 0.11 cm^2  MR VTI: 188 cm  Alias Velocity: 38.5  cm/sPISA Radius: 0.5 cm  MV ERO Volumetric: 0.3 cm^2  PISA area: 1.57 cm^2MR flow  rate: 60.44 ml/sMR  volume:20.68 ml  [email or web address]      Electronically signed by Aylin Voss MD on 6/16/2021 at 2:11 PM

## 2021-06-16 NOTE — CONSULTS
CT surgery New Patient Office Appointment    6/16/2021 2:48 PM    Patient's Name/Date of Birth: Merry Ashford / 1960 (39 y.o.)    PCP: Mandy Nieto MD    Date: June 16, 2021     CC:   Mitral Regurgitation    HPI  We had the pleasure of seeing Merry Ashford in the hospital today, as you know this is a very pleasant 57y.o. year old female dx with severe MR on THERON and cardiac cath by Dr. Jaimee Rain. PastMedical History:  Eloina Hagen  has a past medical history of Agoraphobia, Anemia, Anxiety, Asthma, CAD (coronary artery disease), COPD (chronic obstructive pulmonary disease) ([address] Dementia ([address] Depression, Diabetes mellitus ([address] GERD (gastroesophageal reflux disease), Hypertension, Neuropathy, PTSD (post-traumatic stress disorder), PVD (peripheral vascular disease) ([address] and Renal disease. Past Surgical History:  The patient  has a past surgical history that includes Upper gastrointestinal endoscopy; esophageal motility study (Left, 5/24/2019); hernia repair (2016); Cholecystectomy (2014); Tonsillectomy and adenoidectomy; Hand surgery (Right); Cardiac catheterization; Coronary angioplasty with stent (2019); Hysterectomy, total abdominal (1982); and Ovary removal (Bilateral, 1982). Allergies: The patient is allergic to adhesive tape, baclofen, donepezil, norco [hydrocodone-acetaminophen], percocet [oxycodone-acetaminophen], and tizanidine.     Medications:    Current Facility-Administered Medications:     0.9 % sodium chloride infusion, , Intravenous, Continuous, Luís Luciano PA-C, Last Rate: 100 mL/hr at 06/15/21 1041, New Bag at 06/15/21 1041    sodium chloride flush 0.9 % injection 10 mL, 10 mL, Intravenous, 2 times per day, Luís Luciano PA-C, 10 mL at 06/16/21 1030    sodium chloride flush 0.9 % injection 10 mL, 10 mL, Intravenous, PRN, Luís Luciano PA-C    0.9 % sodium chloride infusion, 25 mL, Intravenous, PRN, Luís Comp, PA-C    LORazepam (ATIVAN) tablet 0.5 mg, 0.5 mg, Oral, Q6H PRN, Soham Pratt MD, 0.5 mg at 06/15/21 1038    furosemide (LASIX) tablet 20 mg, 20 mg, Oral, Daily, Irene Maldonado PA-C, 20 mg at 06/16/21 1031    potassium chloride (KLOR-CON M) extended release tablet 40 mEq, 40 mEq, Oral, PRN **OR** potassium bicarb-citric acid (EFFER-K) effervescent tablet 40 mEq, 40 mEq, Oral, PRN **OR** potassium chloride 10 mEq/100 mL IVPB (Peripheral Line), 10 mEq, Intravenous, PRN, Soham Pratt MD    magnesium sulfate 2000 mg in dextrose 50 mL IVPB, 2,000 mg, Intravenous, PRN, Soham Pratt MD    potassium chloride (KLOR-CON M) extended release tablet 20 mEq, 20 mEq, Oral, PRN, Soham Pratt MD    gabapentin (NEURONTIN) capsule 300 mg, 300 mg, Oral, TID, Dubois Mitten, DO, 300 mg at 06/16/21 1031    pantoprazole (PROTONIX) tablet 40 mg, 40 mg, Oral, QAM AC, Dary Rodriguez, APRN - CNP, 40 mg at 06/16/21 5231    atorvastatin (LIPITOR) tablet 40 mg, 40 mg, Oral, Daily, Dubois Mitten, DO, 40 mg at 06/15/21 2001    citalopram (CELEXA) tablet 20 mg, 20 mg, Oral, Daily, Dubois Mitten, DO, 20 mg at 06/16/21 1030    ferrous sulfate (IRON 325) tablet 325 mg, 325 mg, Oral, BID, Dubois Mitten, DO, 325 mg at 28/70/02 6947    folic acid (FOLVITE) tablet 1 mg, 1 mg, Oral, Daily, Dubois Mitten, DO, 1 mg at 06/16/21 1030    isosorbide mononitrate (IMDUR) extended release tablet 15 mg, 15 mg, Oral, Daily, Dubois Mitten, DO, 15 mg at 06/16/21 1031    levothyroxine (SYNTHROID) tablet 25 mcg, 25 mcg, Oral, Daily, Dubois Mitten, DO, 25 mcg at 06/16/21 0605    nicotine (NICODERM CQ) 14 MG/24HR 1 patch, 1 patch, Transdermal, Q24H, Silvino More DO, 1 patch at 06/13/21 1328    traZODone (DESYREL) tablet 100 mg, 100 mg, Oral, Nightly, Silvino More DO, 100 mg at 06/15/21 2254    vitamin B-12 (CYANOCOBALAMIN) tablet 1,000 mcg, 1,000 mcg, Oral, Daily, Silvino More DO, 1,000 mcg at 06/16/21 1030    memantine (NAMENDA) tablet 10 mg, 10 mg, Oral, BID, Silvino More DO, 10 mg at 06/16/21 1030    insulin lispro (HUMALOG) injection vial 0-6 Units, 0-6 Units, Subcutaneous, TID WC, Pegge Bigness, DO, 2 Units at 06/14/21 1236    insulin lispro (HUMALOG) injection vial 0-3 Units, 0-3 Units, Subcutaneous, Nightly, Pegge Bigness, DO, 1 Units at 06/15/21 2008    sodium chloride flush 0.9 % injection 5-40 mL, 5-40 mL, Intravenous, 2 times per day, Klaus Salomon MD, 10 mL at 06/14/21 2014    sodium chloride flush 0.9 % injection 5-40 mL, 5-40 mL, Intravenous, PRN, Klaus Salomon MD    0.9 % sodium chloride infusion, 25 mL, Intravenous, PRN, Klaus Salomon MD    ondansetron (ZOFRAN-ODT) disintegrating tablet 4 mg, 4 mg, Oral, Q8H PRN **OR** ondansetron (ZOFRAN) injection 4 mg, 4 mg, Intravenous, Q6H PRN, Klaus Salomon MD    polyethylene glycol (GLYCOLAX) packet 17 g, 17 g, Oral, Daily PRN, Klaus Salomon MD    acetaminophen (TYLENOL) tablet 650 mg, 650 mg, Oral, Q6H PRN, 650 mg at 06/16/21 0606 **OR** acetaminophen (TYLENOL) suppository 650 mg, 650 mg, Rectal, Q6H PRN, Klaus Salomon MD    glucose (GLUTOSE) 40 % oral gel 15 g, 15 g, Oral, PRN, Klaus Salomon MD    dextrose 50 % IV solution, 12.5 g, Intravenous, PRN, Klaus Salomon MD    glucagon (rDNA) injection 1 mg, 1 mg, Intramuscular, PRN, Klaus Salomon MD    dextrose 5 % solution, 100 mL/hr, Intravenous, PRN, Klaus Salomon MD    Family History: This patient's family history is not on file. Social History:  Kerri Slater  reports that she has quit smoking. Her smoking use included cigarettes. She started smoking about 51 years ago. She has a 20.00 pack-year smoking history. She has never used smokeless tobacco. She reports previous alcohol use. She reports previous drug use. Imaging:  ECHO: I have reviewed the ECHO results.    Transesophageal Echocardiography Report (THERON)      Demographics      Patient Name   [facility]  Gender              Female                  K      MR #           [id number]      Race                                                    Ethnicity      Account # [id number]      Room Number         0019      Accession      [id number]     Date of Study       06/11/2021   Number      Date of Birth  1960     Referring Physician Eber Sullivan      Age            57 year(s)     Mandi Albert RVT                                    Interpreting        Mehdi Whyte MD                                 Physician     Procedure     Type of Study      THERON procedure:ECHOCARDIOGRAM TRANSESOPHAGEAL. Procedure Date  Date: 06/11/2021 Start: 03:35 PM     Study Location: Cath lab     Indications:Mitral regurgitation. Additional Medical History:former smoker, bradycardia, anemia, diabetes,  COPD, GERD, coronary artery disease, PVD     Patient Status: Routine     Contrast Medium: Bubble Study. Height: 61 inches Weight: 236.01 pounds BSA: 2.03 m^2 BMI: 44.59 kg/m^2     BP: 122/63 mmHg     Procedure Medications    - Fentanyl 100 mcg.       - Versed 2 mg. Procedure Descriptor:-The transesophageal approach was used. -Probe inserted with no complications by cardiologist.  -The study included complete 2D imaging, complete spectral doppler, and  color doppler.  -Procedure performed without complications. Allergies    - See Epic. Conclusions      Summary   Probe easily inserted by Cardiologist.   Procedure performed without complications. The study included complete 2D imaging, complete spectral doppler, and   color doppler. The transesophageal approach was used. Risk benefits and alternatives were explained to the patient and informed   consent was obtained. Ejection fraction was estimated at 50-55%. Left atrial size was severely dilated with no thrombus identified. APPENDAGE: The left atrial appendage size was normal with no thrombus   identified.  DOPPLER: The function was normal (normal emptying velocity). ATRIAL SEPTUM: Small PFO identified by color and bubble study. The mitral valve structure was degenerated with restriction of the   posterior leaflet. DOPPLER: The transmitral velocity was within the normal   range with no evidence for mitral stenosis. There was severe mitral   regurgitation (mean gradient 2 mmHg). Mild tricuspid regurgitation. Signature      ----------------------------------------------------------------   Electronically signed by Levi Rasmussen MD (Interpreting   physician) on 06/11/2021 at 05:28 PM   ----------------------------------------------------------------      Findings      Mitral Valve   The mitral valve structure was degenerated with restriction of the   posterior leaflet. DOPPLER: The transmitral velocity was within the normal   range with no evidence for mitral stenosis. There was severe mitral   regurgitation (mean gradient 2 mmHg). Aortic Valve   The aortic valve was trileaflet with normal thickness and cuspal   separation. There was no echocardiographic evidence of a vegetation. DOPPLER: Transaortic velocity was within the normal range with no evidence   of aortic stenosis, Mild aortic regurgitaiton. Tricuspid Valve   The tricuspid valve structure was normal with normal leaflet separation. There was no echocardiographic evidence of a vegetation. DOPPLER: There   was no evidence of tricuspid stenosis. Mild tricuspid regurgitation. Pulmonic Valve   The pulmonic valve leaflets exhibited normal thickness, no calcification,   and normal cuspal separation. There was no echocardiographic evidence of   vegetation. DOPPLER: The transpulmonic velocity was within the normal   range with no evidence for regurgitation. Left Atrium   Left atrial size was severely dilated with no thrombus identified. APPENDAGE: The left atrial appendage size was normal with no thrombus   identified. DOPPLER: The function was normal (normal emptying velocity). initially chosen,  but it was too small for access; therefore, elected to use right femoral  artery and vein. After infiltration of the right inguinal region with  2% lidocaine using micropuncture and modified Seldinger technique under  fluoroscopic guidance and ultrasound guidance, I was able to insert a  5-Bangladeshi sheath in the right femoral artery and 5-Bangladeshi sheath in the  right femoral vein. We then performed a diagnostic coronary  catheterization using 5-Bangladeshi Dawn balloon-tipped wedge catheter and  left heart catheterization using 5-Bangladeshi JL4, 5-Bangladeshi JR4 catheters. We performed left ventriculography using 5-Bangladeshi angled pigtail. RIGHT HEART CATHETERIZATION:  RA 26, RV 62/22, PA 67/30 with a mean of  41, wedge pressure 32 along with PA saturation of 64%. CORONARY ANGIOGRAM:  LEFT MAIN:  Mild luminal irregularities but patent without any  significant obstruction. LAD:  30% stenosis proximally and with a patent stent to the proximal  segment, distally has luminal irregularities with no significant  obstruction. Small diagonal branch without any significant obstruction. LCX:  Patent in the proximal segment. No significant obstruction. Bifurcates into a small AV groove branch and a larger OM1 branch without  any significant obstruction. RCA:  Luminal irregularities throughout without any significant  obstruction. Bifurcates into a very small PLB and larger PDA. RCA is  dominant. LV:  LV systolic pressure is 286. No significant LV to AO systolic  gradient. LVEDP is 30. LVEF is 45 to 50%. Mild to moderate dilation. Aortic valve is tricuspid. No significant aneurysm or dissection of  the visualized portion of the aorta. There is 4+ mitral regurgitation  with reflux into the pulmonary veins. IMMEDIATE COMPLICATIONS:  None. MEDICATIONS:  See EMR. ACCESS:  6-Bangladeshi Angio-Seal was used for hemostasis. ESTIMATED BLOOD LOSS:  Less than 10 mL.      SUMMARY: Severe mitral regurgitation; no significant coronary artery  disease; elevated right heart cath pressures with preserved cardiac  output. PLAN:  1. Bedrest.  2.  Optimal medical therapy. 3.  Risk factor management. 4.  Routine access site care. 5.  Guideline-directed therapy for acute heart failure. 6.  Uptitrate diuretics. 8.  Holding mitral valve workup as outpatient. 9.  CT Surgery consultation. 10.  Follow up with myself in two to four weeks postprocedure. All the above was explained to the patient and the patient's family. They are agreeable and amenable to the above plan. Marisa Jeronimo MD     D: 06/13/2021 4:26:11       T: 06/13/2021 6:23:36     [email or web address]  Job#: [id number]     Doc#: [id number]  Vitals:    06/15/21 2254 06/16/21 0608 06/16/21 0927 06/16/21 1129   BP: 132/62 (!) 128/59 (!) 117/56 (!) 116/57   Pulse: 56 69 68 62   Resp: 16 17 18 18   Temp: 98.1 °F (36.7 °C) 98.2 °F (36.8 °C) 98.4 °F (36.9 °C) 98.8 °F (37.1 °C)   TempSrc: Oral Oral Oral Oral   SpO2: 95%  96% 99%   Weight:           ROS:   Constitutional: Negative for activity change, chills, fever and unexpected weight change. Positive for fatigue. Respiratory: Negative for apnea, wheezing and stridor. Positive for STRONG. Cardiovascular: Negative for chest pain, palpitations and leg swelling. Gastrointestinal: Negative for hematochezia, melana, constipation, and N/V/D. Musculoskeletal: Negative for myalgias  Skin: Negative for color change, rash and wound. Neurological: Negative for dizziness or syncope.     Physical Exam:   General Appearance: alert ,conversing, in no acute distress  Cardiovascular: normal rate, regular rhythm, normal S1 and S2, Grade III/VI VIVEK THOMAS with no rubs, clicks, or gallops  Pulmonary/Chest: clear to auscultation bilaterally- no wheezes, rales or rhonchi, normal air movement, no respiratory distress  Abdomen:soft, non-tender, non-distended, normal bowel sounds, no bruits,   Extremities: no cyanosis, clubbing or edema. Skin: warm and dry, no rash or erythema  Head: normocephalic and atraumatic  Neck: supple and non-tender without mass, no thyromegaly, no JVD   Musculoskeletal: normal range of motion, no joint swelling, deformity or tenderness. Neurological: alert, oriented, normal speech, no focal findings or movement disorder noted. Capillary Refill: Brisk,< 3 seconds   Peripheral Pulses: +2 palpable, equal bilaterally     Active Problem List:  Patient Active Problem List   Diagnosis    GI bleed    Bradycardia    Anemia       Assessment:   Severe Mitral Regurgitation    Plan: 6/16/21  57y.o. year-old female with severe symptomatic mitral regurgitation. Patient clearly is at high risk for surgical mitral valve replacement, for reasons of age, frality, STS score, and multiple co-morbidities HTN, DM, CKD-III, anemia of chronic disease, CAD, and PVD. Recommend proceed with evaluation for MitraClip. The plan of care was discussed in detail with Dr. Prince Ruffin    I performed an independent history and physical examination, and personally reviewed all relevant imaging. I agree with the  physician assistant's findings, with the following comments. 57y.o. year-old female with severe symptomatic mitral regurgitation. Patient clearly is at high risk for surgical mitral valve intervention, for reasons of frailty, advanced medical comorbidities, and poor rehabilitation potential. Recommend proceed with evaluation for mitraclip procedure.

## 2021-06-16 NOTE — PROGRESS NOTES
CLINICAL PHARMACY: DISCHARGE MED RECONCILIATION/REVIEW    [facility]) Select Patient?: No  Total # of Interventions Recommended: 0   -   Total # Interventions Accepted: 0  Intervention Severity:   - Level 1 Intervention Present?: No   - Level 2 #: 0   - Level 3 #: 0   Time Spent (min): 15    Additional Documentation:    Ready for discharge.     Vero Isabel PharmD, [facility]  6/16/2021  11:56 AM

## 2021-06-18 ENCOUNTER — TELEPHONE (OUTPATIENT)
Dept: CARDIOLOGY CLINIC | Age: 61
End: 2021-06-18

## 2021-06-18 NOTE — TELEPHONE ENCOUNTER
LMOM from caretaker [facility] states Rambo Lovett is having \"a lot\" of incisional pain     Returned call, LM to call office       6/15/21 medtronic dual pacer insert

## 2021-06-23 NOTE — PROGRESS NOTES
Physician Progress Note      PATIENT:               Seema Boswell  CSN #:                  [id number]  :                       1960  ADMIT DATE:       2021 12:16 PM  [facility] DATE:        2021 3:30 PM  RESPONDING  PROVIDER #:        Claribel Mccord MD          QUERY TEXT:    Dr Josh Parr,    Patient admitted with AMS. Documentation reflects patient is oriented x3 but   somewhat lethargic. Her Wellbutrin was discontinued and Gabapentin decreased   with documentation of patient now alert and oriented back to baseline. If   possible, please document in the progress notes and discharge summary if   Metabolic Encephalopathy was: The medical record reflects the following:  Risk Factors: DM, COPD, HTN, GERD, CKD 3  Clinical Indicators: Admitted for AMS. Documentation reflects patient is   oriented x3 but somewhat lethargic. Her Wellbutrin was discontinued and   Gabapentin decreased with documentation of patient now alert and oriented back   to baseline. Treatment: Wellbutrin was discontinued and Gabapentin decreased  Options provided:  -- Metabolic Encephalopathy confirmed after study  -- Metabolic Encephalopathy treated and resolved  -- Metabolic Encephalopathy ruled out after study  -- Other - I will add my own diagnosis  -- Disagree - Not applicable / Not valid  -- Disagree - Clinically unable to determine / Unknown  -- Refer to Clinical Documentation Reviewer    PROVIDER RESPONSE TEXT:    Metabolic Encephalopathy confirmed after study.     Query created by: Dale Sandoval on 2021 9:13 AM      Electronically signed by:  Claribel Mccord MD 2021   8:26 AM

## 2021-06-24 ENCOUNTER — TELEPHONE (OUTPATIENT)
Dept: CARDIOLOGY CLINIC | Age: 61
End: 2021-06-24

## 2021-06-24 NOTE — PROGRESS NOTES
CLINICAL PHARMACY NOTE: MEDS TO BEDS    Total # of Prescriptions Filled: 4   The following medications were delivered to the patient:  Prasugrel 10mg  Furosemide 20mg  Metformin HCL 500mg  Alcohol Prep Pad 70%    Additional Documentation:

## 2021-06-24 NOTE — TELEPHONE ENCOUNTER
Called patient to see if she could come to office today.  Atrial lead threshold and amplitude elevated on carelink

## 2021-06-25 ENCOUNTER — TELEPHONE (OUTPATIENT)
Dept: CARDIOLOGY CLINIC | Age: 61
End: 2021-06-25

## 2021-06-25 NOTE — TELEPHONE ENCOUNTER
LM to RS appt from 6/25-due to transportation issues. If patient able to please RS on 6/30/2021 at 1100.

## 2021-06-25 NOTE — TELEPHONE ENCOUNTER
I called and spoke to the pt . She cannot come in the office today because she does not have a ride to get here and she lives in [facility] [place]. I told pt she has an appt with dr Analisa Murillo today in the office and she said she is not coming because she has no way to get here .  I did inform Carmine Smith with dr Analisa Murillo so she could call and r/s this pt     I asked her to send a download and will look at it

## 2021-06-28 ENCOUNTER — TELEPHONE (OUTPATIENT)
Dept: CARDIOLOGY CLINIC | Age: 61
End: 2021-06-28

## 2021-06-28 NOTE — TELEPHONE ENCOUNTER
Devon schroeder to call this office.  I did let her know that there are 2 depts in this office trying to contact her and she needs to call ernestina and phone number given

## 2021-06-28 NOTE — TELEPHONE ENCOUNTER
Per patient we need to contact [facility] caregiver at [phone] to schedule appts. She has a pacer appt on 6/29, maybe we can schedule both appts on Wednesday? LM for Jase Zhao to call our office back.

## 2021-06-28 NOTE — TELEPHONE ENCOUNTER
Talked to Mekhi Gaytan and pacer appt made on 6/30/2021 at 21 [phone] and then she will see Dr. Cindy Steve at 1100. Mekhi Gaytan verbalized appt date, times and location.

## 2021-06-30 ENCOUNTER — TELEPHONE (OUTPATIENT)
Dept: CARDIOLOGY CLINIC | Age: 61
End: 2021-06-30

## 2021-06-30 ENCOUNTER — NURSE ONLY (OUTPATIENT)
Dept: CARDIOLOGY CLINIC | Age: 61
End: 2021-06-30
Payer: MEDICARE

## 2021-06-30 ENCOUNTER — OFFICE VISIT (OUTPATIENT)
Dept: CARDIOLOGY CLINIC | Age: 61
End: 2021-06-30

## 2021-06-30 VITALS
DIASTOLIC BLOOD PRESSURE: 72 MMHG | SYSTOLIC BLOOD PRESSURE: 107 MMHG | HEIGHT: 60 IN | HEART RATE: 66 BPM | WEIGHT: 231.6 LBS | BODY MASS INDEX: 45.47 KG/M2

## 2021-06-30 DIAGNOSIS — I34.0 MITRAL VALVE INSUFFICIENCY, UNSPECIFIED ETIOLOGY: Primary | ICD-10-CM

## 2021-06-30 DIAGNOSIS — Z95.0 CARDIAC PACEMAKER IN SITU: Primary | ICD-10-CM

## 2021-06-30 PROCEDURE — 99024 POSTOP FOLLOW-UP VISIT: CPT | Performed by: INTERNAL MEDICINE

## 2021-06-30 PROCEDURE — 93280 PM DEVICE PROGR EVAL DUAL: CPT | Performed by: INTERNAL MEDICINE

## 2021-06-30 NOTE — PROGRESS NOTES
[address] [facility] [place] [address]  [place] [address]  Dept: [phone]  Dept Fax: [phone]  Loc: [phone]    Visit Date: 6/30/2021    Ms. Kathyanne Gottron is a 57 y.o. female  who presented for:  Chief Complaint   Patient presents with    New Patient    Check-Up       HPI:   Danyelle Lamas is a 57 y.o. female who is here for evaluation for MitraClip secondary to severe mitral regurgitation after completion of right heart catheterization on 6/2021. The patient is s/p pacemaker placement 6/15/21. The patient stated that she is having more shortness of breath, dyspnea on exertion, orthopnea, and bilateral extremity edema. The patient denies chest pain or palpitations. Taking DAPT. Using home O2. No chest pain. Symptoms worse in the last 6 months. Hard to do all ADLs. Making urine. Compliant with drugs. Does have poor dentition. No hx of malignancies. No sig a/t/d/. Current Outpatient Medications:     prasugrel (EFFIENT) 10 MG TABS, Take 1 tablet by mouth daily, Disp: 30 tablet, Rfl: 1    furosemide (LASIX) 20 MG tablet, Take 1 tablet by mouth daily, Disp: 60 tablet, Rfl: 3    metFORMIN (GLUCOPHAGE) 500 MG tablet, Take 1 tablet by mouth 2 times daily (with meals), Disp: 180 tablet, Rfl: 1    Alcohol Swabs (ALCOHOL PREP) 70 % PADS, Us e3 imes per day Diagnosis:  Diabetes Non-Insulin Dependent, Disp: 100 each, Rfl: 11    blood glucose monitor strips, Test 3 times a day & as needed for symptoms of irregular blood glucose. Dispense sufficient amount for indicated testing frequency plus additional to accommodate PRN testing needs. , Disp: 100 strip, Rfl: 0    glucose monitoring kit (FREESTYLE) monitoring kit, 1 kit by Does not apply route daily, Disp: 1 kit, Rfl: 0    pantoprazole (PROTONIX) 40 MG tablet, Take 1 tablet by mouth every morning (before breakfast), Disp: 30 tablet, Rfl: 2    metoprolol succinate (TOPROL XL) 25 MG extended release tablet, Take 12.5 mg by mouth daily, Disp: , Rfl:     nystatin (MYCOSTATIN) 842544 UNIT/GM powder, Apply topically 2 times daily Apply topically 2 times daily. , Disp: , Rfl:     nicotine (NICODERM CQ) 14 MG/24HR, Place 1 patch onto the skin every 24 hours, Disp: , Rfl:     linaclotide (LINZESS) 145 MCG capsule, Take 145 mcg by mouth every morning (before breakfast), Disp: , Rfl:     folic acid (FOLVITE) 1 MG tablet, Take 1 mg by mouth daily, Disp: , Rfl:     vitamin B-12 (CYANOCOBALAMIN) 1000 MCG tablet, Take 1,000 mcg by mouth daily, Disp: , Rfl:     ferrous sulfate (IRON 325) 325 (65 Fe) MG tablet, Take 325 mg by mouth 2 times daily, Disp: , Rfl:     Exenatide ER (BYDUREON BCISE) 2 MG/0.85ML AUIJ, Inject 0.85 mLs into the skin once a week, Disp: , Rfl:     traZODone (DESYREL) 100 MG tablet, Take 100 mg by mouth nightly, Disp: , Rfl:     gabapentin (NEURONTIN) 400 MG capsule, Take 800 mg by mouth 3 times daily. , Disp: , Rfl:     citalopram (CELEXA) 20 MG tablet, Take 20 mg by mouth daily, Disp: , Rfl:     isosorbide mononitrate (IMDUR) 30 MG extended release tablet, Take 15 mg by mouth daily, Disp: , Rfl:     buPROPion (WELLBUTRIN XL) 300 MG extended release tablet, Take 300 mg by mouth every morning, Disp: , Rfl:     memantine ER (NAMENDA XR) 28 MG CP24 extended release capsule, Take 28 mg by mouth daily, Disp: , Rfl:     levothyroxine (SYNTHROID) 25 MCG tablet, Take 25 mcg by mouth Daily, Disp: , Rfl:     atorvastatin (LIPITOR) 40 MG tablet, Take 40 mg by mouth daily, Disp: , Rfl:     Past Medical History  Luzmaria Amin  has a past medical history of Agoraphobia, Anemia, Anxiety, Asthma, CAD (coronary artery disease), COPD (chronic obstructive pulmonary disease) ([facility] [address] Dementia ([facility] [address] Depression, Diabetes mellitus ([facility] GERD (gastroesophageal reflux disease), Hypertension, Neuropathy, PTSD (post-traumatic stress disorder), PVD [facility]peripheral vascular disease) [facility] and Renal disease. Social History  Kaitlyn Ruffin  reports that she has quit smoking. Her smoking use included cigarettes. She started smoking about 51 years ago. She has a 20.00 pack-year smoking history. She has never used smokeless tobacco. She reports previous alcohol use. She reports previous drug use. Family History  Kaitlyn Ruffin family history is not on file. There is no family history of bicuspid aortic valve, aneurysms, heart transplant, pacemakers, defibrillators, or sudden cardiac death. Past Surgical History   Past Surgical History:   Procedure Laterality Date    CARDIAC CATHETERIZATION      CHOLECYSTECTOMY  2014    CORONARY ANGIOPLASTY WITH STENT PLACEMENT  2019    x2    ESOPHAGEAL MOTILITY STUDY Left 5/24/2019    ESOPHAGEAL MOTILITY/MANOMETRY STUDY performed by Caroline Gonzalez MD at [facility]    HAND SURGERY Right     HERNIA REPAIR  2016    HYSTERECTOMY, TOTAL ABDOMINAL  1982    OVARY REMOVAL Bilateral 1982    TONSILLECTOMY AND ADENOIDECTOMY      UPPER GASTROINTESTINAL ENDOSCOPY         Review of Systems   Constitutional: Negative for chills and fever  HENT: Negative for congestion, sinus pressure, sneezing and sore throat. Eyes: Negative for pain, discharge, redness and itching. Respiratory: SOB, STRONG, orthopnea  Gastrointestinal: Negative for blood in stool, constipation, diarrhea   Endocrine: Negative for cold intolerance, heat intolerance, polydipsia. Genitourinary: Negative for dysuria, enuresis, flank pain and hematuria. Musculoskeletal: Negative for arthralgias, joint swelling and neck pain. Neurological: Negative for numbness and headaches. Psychiatric/Behavioral: Negative for agitation, confusion, decreased concentration and dysphoric mood.      Objective:     /72   Pulse 66   Ht 5' (1.524 m)   Wt 231 lb 9.6 oz (105.1 kg)   BMI 45.23 kg/m²     Wt Readings from Last 3 Encounters:   06/30/21 231 lb 9.6 oz (105.1 kg)   06/14/21 245 lb 12.8 oz (111.5 kg)   05/24/19 236 lb (107 kg)     BP Readings from Last 3 Encounters:   06/30/21 107/72   06/16/21 (!) 116/57   05/24/19 (!) 88/51       Nursing note and vitals reviewed. Physical Exam   Constitutional: Oriented to person, place, and time. Appears well-developed and well-nourished. HENT:   Head: Normocephalic and atraumatic. Eyes: EOM are normal. Pupils are equal, round, and reactive to light. Neck: Normal range of motion. Neck supple. No JVD present. Cardiovascular: Normal rate, regular rhythm, normal heart sounds and intact distal pulses. Mitral regurgitation murmur heard. Pulmonary/Chest: Effort normal and breath sounds normal. No respiratory distress. No wheezes. No rales. On supplemental oxygen  Abdominal: Soft. Bowel sounds are normal. No distension. There is no tenderness. Musculoskeletal: Normal range of motion. 2+ pitting edema bilateral lower extemities  Neurological: Alert and oriented to person, place, and time. No cranial nerve deficit. Coordination normal.   Skin: Skin is warm and dry. Psychiatric: Normal mood and affect.        No results found for: CKTOTAL, CKMB, CKMBINDEX    Lab Results   Component Value Date    WBC 6.8 06/15/2021    RBC 2.75 06/15/2021    RBC 4.52 12/27/2017    HGB 8.4 06/15/2021    HCT 29.5 06/15/2021    .3 06/15/2021    MCH 30.5 06/15/2021    MCHC 28.5 06/15/2021    RDW 13.5 12/27/2017     06/15/2021    MPV 9.0 06/15/2021       Lab Results   Component Value Date     06/16/2021    K 4.0 06/16/2021     06/16/2021    CO2 28 06/16/2021    BUN 17 06/16/2021    LABALBU 3.9 06/09/2021    CREATININE 1.0 06/16/2021    CALCIUM 8.7 06/16/2021    LABGLOM 56 06/16/2021    GLUCOSE 106 06/16/2021    GLUCOSE 107 12/27/2017       Lab Results   Component Value Date    ALKPHOS 91 06/09/2021    ALT 18 06/09/2021    AST 21 06/09/2021    PROT 6.7 06/09/2021    BILITOT 1.2 06/09/2021    BILITOT 0.7 12/27/2017    BILIDIR 0.2 12/27/2017    LABALBU 3.9 06/09/2021       Lab Results   Component Value Date    MG 1.8 06/12/2021       Lab Results   Component Value Date    INR 1.02 06/15/2021    INR 1.03 06/11/2021         Lab Results   Component Value Date    LABA1C 6.3 12/27/2017       Lab Results   Component Value Date    TRIG 124 12/27/2017       Lab Results   Component Value Date    TSH 2.200 06/09/2021         Testing Reviewed:      I have individually reviewed the cardiac test below:    ECHO: Results for orders placed during the hospital encounter of 06/09/21    ECHO Complete 2D W Doppler W Color    Narrative  Transthoracic Echocardiography Report (TTE)    Demographics    Patient Name   [facility]  Gender              Other  K    MR #           [id number]      Race                    Ethnicity    Account #      [de-identified]      Room Number         0019    Accession      [id number]     Date of Study       06/10/2021  Number    Date of Birth  1960     Referring Physician Hernan Easton MD    Age            57 year(s)     Harry Valle, CS    Interpreting        Marisel Brown MD  Physician    Procedure    Type of Study    TTE procedure:ECHOCARDIOGRAM COMPLETE 2D W DOPPLER W COLOR. Procedure Date  Date: 06/10/2021 Start: 01:24 PM    Study Location: Bedside  Technical Quality: Adequate visualization    Indications:Elevated troponin. Additional Medical History: Former smoker, anemia, asthma, COPD,  hypertension, diabetes, coronary artery disease, peripheral vascular disease    Patient Status: Routine    Height: 61 inches Weight: 236.01 pounds BSA: 2.03 m^2 BMI: 44.59 kg/m^2    BP: 136/74 mmHg    Conclusions    Summary  Technically difficult examination. Left ventricular size and systolic function is normal. Ejection fraction  was estimated at 55-60%. LV wall thickness is within normal limits. The right ventricular size appears normal with normal systolic function  and wall thickness.   Right ventricular systolic pressure of 35-40 mm Hg consistent with mild  pulmonary hypertension. Mild aortic regurgitation is noted. The mitral valve was not well visualized. Possible Flail leaflet. Moderate mitral regurgitation is present. Consider THERON to further evaluate  Mitral valve if clinically indicated. Signature    ----------------------------------------------------------------  Electronically signed by Austin Schroeder MD (Interpreting  physician) on 06/10/2021 at 04:34 PM  ----------------------------------------------------------------    Findings    Mitral Valve  The mitral valve was not well visualized. Possible Flail leaflet. Moderate mitral regurgitation is present. Consider THERON to further evaluate  Mitral valve if clinically indicated. Aortic Valve  The aortic valve appears trileaflet with normal thickness and leaflet  excursion. DOPPLER: Transaortic velocity was within the normal range with  no evidence of aortic stenosis. Mild aortic regurgitation is noted. Tricuspid Valve  The tricuspid valve structure is normal with normal leaflet separation. DOPPLER: There is no evidence of tricuspid stenosis. Mild tricuspid regurgitation visualized. Pulmonic Valve  The pulmonic valve was not well visualized . Left Atrium  Moderately dilated left atrium. Left Ventricle  Left ventricular size and systolic function is normal. Ejection fraction  was estimated at 55-60%. LV wall thickness is within normal limits. Right Atrium  Mildly enlarged right atrium size. Right Ventricle  The right ventricular size appears normal with normal systolic function  and wall thickness. Right ventricular systolic pressure of 42-31 mm Hg consistent with mild  pulmonary hypertension. Pericardial Effusion  The pericardium appears normal with no evidence of a pericardial effusion. Pleural Effusion  No evidence of pleural effusion. Aorta / Great Vessels  -Aortic root dimension within normal limits.   -IVC size is within normal limits with normal respiratory phasic changes. M-Mode/2D Measurements & Calculations    LV Diastolic   LV Systolic Dimension:    AV Cusp Separation: 1.7 cmLA  Dimension: 5.4 3.6 cm                    Dimension: 4.3 cmAO Root  cm             LV Volume Diastolic: 255  Dimension: 3.3 cmLA Area: 18.6  LV FS:33.3 %   ml                        cm^2  LV PW          LV Volume Systolic: 94.6  Diastolic: 1   ml  cm             LV EDV/LV EDV Index: 141  Septum         ml/69 m^2LV ESV/LV ESV    RV Diastolic Dimension: 2.9 cm  Diastolic: 1   Index: 13.2 ml/27 m^2  cm             EF Calculated: 61.4 %     LA/Aorta: 1.3  Ascending Aorta: 3 cm  LA volume/Index: 52.3 ml /26m^2    Doppler Measurements & Calculations    MV Peak E-Wave: 164 cm/s    AV Peak Velocity:  LVOT Peak Velocity: 108  MV Peak A-Wave: 40.2 cm/s   200 cm/s           cm/s  MV E/A Ratio: 4.08          AV Peak Gradient:  LVOT Mean Velocity: 67.9  MV Peak Gradient: 10.76     16 mmHg            cm/s  mmHg                        AV Mean Velocity:  LVOT Peak Gradient: 5  MV Mean Gradient: 3 mmHg    142 cm/s           mmHgLVOT Mean Gradient: 2  MV Mean Velocity: 69.9 cm/s AV Mean Gradient:  mmHg  MV Deceleration Time: 204   9 mmHg  msec                        AV VTI: 38.9 cm  MV P1/2t: 68 msec  MVA by PHT:3.24 cm^2                           TR Velocity:292 cm/s  MV Area (PISA): 0.3 cm^2    LVOT VTI: 20.2 cm  TR Gradient:34.11 mmHg  MV E' Septal Velocity: 5.7  AV P1/2t: 691 msec PV Peak Velocity: 73.7 cm/s  cm/s                        IVRT: 63 msec      PV Peak Gradient: 2.17 mmHg  MV A' Septal Velocity: 4.5  cm/s  MV E' Lateral Velocity: 7.7 AV DVI (VTI):  cm/s                        0. 52AV DVI  MV A' Lateral Velocity: 4.2 (Vmax):0.54  cm/s  E/E' septal: 28.77  E/E' lateral: 21.3  MR Velocity: 532 cm/s  MV JUMA PISA: 0.11 cm^2  MR VTI: 188 cm  Alias Velocity: 38.5  cm/sPISA Radius: 0.5 cm  MV ERO Volumetric: 0.3 cm^2  PISA area: 1.57 cm^2MR flow  rate: 60.44 ml/sMR  volume:20.68 ml    [email or web address] [id number]%3d       Assessment/Plan   Compensated systolic heart failure NYHA III - EF =45-50% demonstrated on cardiac catheterization 6/2021.  - Continue Imdur, Lasix, and Toprol; deferring ACE-I and aldactone for CKD III    Severe Symptomatic Mitral Regurgitation - right heart catheterization 6/11/21 RA=26, RV 62/22, PA 67/30 with a mean of 41, Wedge pressure 32, PA saturation of 64%. - Recommend MitraClip; Patient recently had pacemaker placed (6/2021) and will need to wait 3 months before MitraClip placement. The patient was educated that postponement places her at increased risk for further decompensation with additional risk of inability to proceed with the procedure if this should occur. The patient verbalized understanding of these risks. CAD - s/p stenting to LAD and circumflex. Pacemaker placed 6/2021 for bradycardia  - Management as above    -The patient was educated on continuing her tobacco abstinence, diet/exercise/weight loss. She understand and is willing to comply with recommendations. Electronically signed by Kentrell Lopez DO on 6/30/2021 at 10:58 AM     Attending Supervising Physician's [address] Statement  I performed a history and physical examination on the patient and discussed the management with the resident physician. I reviewed and agree with the findings and plan as documented in the resident's note except for as noted below. Given the STS scores, age, frailty, comorbidities, and the imaging findings, the patient is currently prohibitive risk for surgical MVR. Discussed transcatheter mitral valve repair (MitraClip) with the patient/family regarding risks, benefits, alternatives to the procedure. The patient understands the associated visit with CT surgeon. The patient is FULL CODE. The POA is listed in the chart.   We will schedule the above as appropriate. Once complete and appropriate based on the findings, a procedure date will be aligned at [facility], and we will notify the patient of further instructions. We had a long discussion with myself, family, patient, and structural heart coordinators. The family and patient were explained the risks/benefits/alternatives of the procedure, how the procedure works (need for general anesthesia, intubation, THERON), the work-up, post-procedural care, and all of the possible complications of the procedure, including, but not limited to vascular injury, debilitating stroke, cardiac perforation, device related thrombosis, device embolization, bleeding, need for open heart surgery, and death. The patient/family would like to pursue MitraClip at our facility and we will work towards this goal.        The patient/family understand that should there be any findings or issues that arise during the evaluation that would preclude MitraClip, that this will need to be evaluated prior to proceeding with a percutaneous approach. Further, a unified heart team approach will be performed prior to proceeding with MitraClip which includes the patient's primary care givers, cardiologist, and the entire structural heart team (interventionalist, CT surgeons, structural heart NP). The patient and family appeared to understand everything, all of their questions were answered to their satisfaction and they are agreeable to proceed with further evaluation for MitraClip. Thank you for allowing us to participate in the care of this patient. Please do not hesitate to call us with questions. \    Electronically signed by Milagro Diallo MD on 7/7/21 at 5:24 PM EDT

## 2021-06-30 NOTE — PROGRESS NOTES
In Office Med[employer] Dual Pacemaker -- incision check - implant 6/15/21  [employer] every 3m  Patient of [facility]    Battery 4.8 years    Presenting rhythm AP   Underlying rhythm atrial standstill with rare p wave, pt does have conduction today to RV. Hakim implant note:   Brief medical history:   61/F with symptomatic severe sinus bradycardia with slow junctional escape and isorhythmic Av dissociation. Preserved LVEF. Medcial hx: CAD/PCI-LAD (2/20), preserved LVEF (55%),  HTN, DM, CKD-III, PVD, anemia of chronic illness, depression and mild dementia.      6/17/21atrial lead position check failure    A Impedance 418  RV Impedance 475    P wave sensing 0.5  R wave sensing >20    A Threshold 0.75 @ 1.0- threshold repeated at 0.4 pulse width, threshold 1.5 @ 0.4. device programmed 3.5 @0.4  RV Thresholds 0.75 @ 0.40  RV Amplitude 3.5 @ 0.40      Programmed Mode DDDR     A Paced 92.5%  V Paced 90.2% --RV conduction today, paced AV delay increased to 280. Sensed delay increased to 260. Can consider . AAIR <=> DDDR if low RV paced % at 3 month f/u    Afib [place] 0%    Episodes none    Steri strips intact with old drainage, minor puffiness, no drainage, tender to touch. Steristrips taken off and cleaned and new ones placed on.     If, at any point, there is any increased redness, swelling, increased discomfort, fever, or the incision opens up, the patient is aware to go to the emergency room

## 2021-07-01 ENCOUNTER — TELEPHONE (OUTPATIENT)
Dept: CARDIOLOGY CLINIC | Age: 61
End: 2021-07-01

## 2021-07-01 NOTE — TELEPHONE ENCOUNTER
Effie LM on VM. Pt was at PCP and they put her on Hydroxyzine 25 mg prn for anxiety. Cynthia Lance is asking if Dr Surjit Nelson is okay with this?

## 2021-07-09 ENCOUNTER — TELEPHONE (OUTPATIENT)
Dept: CARDIOLOGY CLINIC | Age: 61
End: 2021-07-09

## 2021-07-09 DIAGNOSIS — I34.0 NONRHEUMATIC MITRAL VALVE REGURGITATION: Primary | ICD-10-CM

## 2021-07-20 ENCOUNTER — OFFICE VISIT (OUTPATIENT)
Dept: CARDIOLOGY CLINIC | Age: 61
End: 2021-07-20
Payer: MEDICARE

## 2021-07-20 ENCOUNTER — OFFICE VISIT (OUTPATIENT)
Dept: CARDIOTHORACIC SURGERY | Age: 61
End: 2021-07-20
Payer: MEDICARE

## 2021-07-20 VITALS
WEIGHT: 239.2 LBS | HEART RATE: 65 BPM | OXYGEN SATURATION: 100 % | BODY MASS INDEX: 46.96 KG/M2 | SYSTOLIC BLOOD PRESSURE: 91 MMHG | HEIGHT: 60 IN | DIASTOLIC BLOOD PRESSURE: 61 MMHG

## 2021-07-20 VITALS
BODY MASS INDEX: 46.98 KG/M2 | DIASTOLIC BLOOD PRESSURE: 61 MMHG | HEART RATE: 65 BPM | HEIGHT: 60 IN | SYSTOLIC BLOOD PRESSURE: 91 MMHG | WEIGHT: 239.3 LBS | OXYGEN SATURATION: 100 %

## 2021-07-20 DIAGNOSIS — I95.9 HYPOTENSION, UNSPECIFIED HYPOTENSION TYPE: ICD-10-CM

## 2021-07-20 DIAGNOSIS — I34.0 NONRHEUMATIC MITRAL VALVE REGURGITATION: Primary | ICD-10-CM

## 2021-07-20 DIAGNOSIS — Z95.0 CARDIAC PACEMAKER: ICD-10-CM

## 2021-07-20 DIAGNOSIS — I34.0 NONRHEUMATIC MITRAL VALVE REGURGITATION: ICD-10-CM

## 2021-07-20 DIAGNOSIS — I50.32 CHF (CONGESTIVE HEART FAILURE), NYHA CLASS III, CHRONIC, DIASTOLIC (HCC): Primary | ICD-10-CM

## 2021-07-20 DIAGNOSIS — J43.9 PULMONARY EMPHYSEMA, UNSPECIFIED EMPHYSEMA TYPE (HCC): ICD-10-CM

## 2021-07-20 DIAGNOSIS — D64.9 ANEMIA, UNSPECIFIED TYPE: ICD-10-CM

## 2021-07-20 DIAGNOSIS — Z99.81 ON HOME O2: ICD-10-CM

## 2021-07-20 PROCEDURE — G8417 CALC BMI ABV UP PARAM F/U: HCPCS | Performed by: NURSE PRACTITIONER

## 2021-07-20 PROCEDURE — 99214 OFFICE O/P EST MOD 30 MIN: CPT | Performed by: PHYSICIAN ASSISTANT

## 2021-07-20 PROCEDURE — 3023F SPIROM DOC REV: CPT | Performed by: NURSE PRACTITIONER

## 2021-07-20 PROCEDURE — G8926 SPIRO NO PERF OR DOC: HCPCS | Performed by: NURSE PRACTITIONER

## 2021-07-20 PROCEDURE — 1036F TOBACCO NON-USER: CPT | Performed by: NURSE PRACTITIONER

## 2021-07-20 PROCEDURE — G8427 DOCREV CUR MEDS BY ELIG CLIN: HCPCS | Performed by: NURSE PRACTITIONER

## 2021-07-20 PROCEDURE — 99215 OFFICE O/P EST HI 40 MIN: CPT | Performed by: NURSE PRACTITIONER

## 2021-07-20 PROCEDURE — 3017F COLORECTAL CA SCREEN DOC REV: CPT | Performed by: NURSE PRACTITIONER

## 2021-07-20 RX ORDER — HYDROXYZINE PAMOATE 25 MG/1
25 CAPSULE ORAL DAILY
COMMUNITY
End: 2022-01-13 | Stop reason: ALTCHOICE

## 2021-07-20 ASSESSMENT — ENCOUNTER SYMPTOMS
ABDOMINAL DISTENTION: 0
SHORTNESS OF BREATH: 1
COUGH: 0

## 2021-07-20 NOTE — PROGRESS NOTES
CT surgery New Patient Office Appointment    7/20/2021 11:44 AM    Patient's Name/Date of Birth: Margarito Christensen / 1960 (60 y.o.)    PCP: Venita Varela MD    Date: July 20, 2021     CC:   Mitral Regurgitation    HPI  We had the pleasure of seeing Margarito Christensen in the office today, as you know this is a very pleasant 57y.o. year old female with a history of mitral regurgitation. Mrs. Jose Raul Michael has h/o shortness of breath, dyspnea on exertion, orthopnea and bilat LE edema. Her symptoms have gotten worse over the past several months. PastMedical History:  Aristides Amador  has a past medical history of Agoraphobia, Anemia, Anxiety, Asthma, CAD (coronary artery disease), COPD (chronic obstructive pulmonary disease) ([address] Dementia ([address] Depression, Diabetes mellitus ([address] GERD (gastroesophageal reflux disease), Hypertension, Neuropathy, PTSD (post-traumatic stress disorder), PVD (peripheral vascular disease) ([address] and Renal disease. Past Surgical History:  The patient  has a past surgical history that includes Upper gastrointestinal endoscopy; esophageal motility study (Left, 5/24/2019); hernia repair (2016); Cholecystectomy (2014); Tonsillectomy and adenoidectomy; Hand surgery (Right); Cardiac catheterization; Coronary angioplasty with stent (2019); Hysterectomy, total abdominal (1982); and Ovary removal (Bilateral, 1982). Allergies: The patient is allergic to adhesive tape, baclofen, donepezil, norco [hydrocodone-acetaminophen], percocet [oxycodone-acetaminophen], and tizanidine.     Medications:    Current Outpatient Medications:     hydrOXYzine (VISTARIL) 25 MG capsule, Take 25 mg by mouth daily Prn, Disp: , Rfl:     prasugrel (EFFIENT) 10 MG TABS, Take 1 tablet by mouth daily, Disp: 30 tablet, Rfl: 1    furosemide (LASIX) 20 MG tablet, Take 1 tablet by mouth daily, Disp: 60 tablet, Rfl: 3    metFORMIN (GLUCOPHAGE) 500 MG tablet, Take 1 tablet by mouth 2 times daily (with meals), Disp: 180 tablet, Rfl: 1    pantoprazole (PROTONIX) 40 MG tablet, Take 1 tablet by mouth every morning (before breakfast), Disp: 30 tablet, Rfl: 2    metoprolol succinate (TOPROL XL) 25 MG extended release tablet, Take 12.5 mg by mouth daily, Disp: , Rfl:     linaclotide (LINZESS) 145 MCG capsule, Take 145 mcg by mouth every morning (before breakfast), Disp: , Rfl:     folic acid (FOLVITE) 1 MG tablet, Take 1 mg by mouth daily, Disp: , Rfl:     vitamin B-12 (CYANOCOBALAMIN) 1000 MCG tablet, Take 1,000 mcg by mouth daily, Disp: , Rfl:     ferrous sulfate (IRON 325) 325 (65 Fe) MG tablet, Take 325 mg by mouth 2 times daily, Disp: , Rfl:     traZODone (DESYREL) 100 MG tablet, Take 100 mg by mouth nightly, Disp: , Rfl:     gabapentin (NEURONTIN) 400 MG capsule, Take 800 mg by mouth 3 times daily. , Disp: , Rfl:     citalopram (CELEXA) 20 MG tablet, Take 20 mg by mouth daily, Disp: , Rfl:     isosorbide mononitrate (IMDUR) 30 MG extended release tablet, Take 15 mg by mouth daily, Disp: , Rfl:     memantine ER (NAMENDA XR) 28 MG CP24 extended release capsule, Take 28 mg by mouth daily, Disp: , Rfl:     atorvastatin (LIPITOR) 40 MG tablet, Take 40 mg by mouth daily, Disp: , Rfl:     Alcohol Swabs (ALCOHOL PREP) 70 % PADS, Us e3 imes per day Diagnosis:  Diabetes Non-Insulin Dependent, Disp: 100 each, Rfl: 11    blood glucose monitor strips, Test 3 times a day & as needed for symptoms of irregular blood glucose. Dispense sufficient amount for indicated testing frequency plus additional to accommodate PRN testing needs. , Disp: 100 strip, Rfl: 0    glucose monitoring kit (FREESTYLE) monitoring kit, 1 kit by Does not apply route daily, Disp: 1 kit, Rfl: 0    nystatin (MYCOSTATIN) 525415 UNIT/GM powder, Apply topically 2 times daily Apply topically 2 times daily. , Disp: , Rfl:     Exenatide ER (BYDUREON BCISE) 2 MG/0.85ML AUIJ, Inject 0.85 mLs into the skin once a week, Disp: , Rfl:     buPROPion (WELLBUTRIN XL) 300 MG extended release tablet, Take 300 mg by mouth every morning, Disp: , Rfl:     levothyroxine (SYNTHROID) 25 MCG tablet, Take 25 mcg by mouth Daily, Disp: , Rfl:     Family History: This patient's family history is not on file. Social History:  Rosendo Abarca  reports that she has quit smoking. Her smoking use included cigarettes. She started smoking about 51 years ago. She has a 20.00 pack-year smoking history. She has never used smokeless tobacco. She reports previous alcohol use. She reports previous drug use. Imaging:  ECHO: I have reviewed the ECHO images. Reading Physician Reading Date Result Priority   Claude Salinas, MD  [phone] 6/11/2021    Unknown Provider Result 6/11/2021       Narrative & Impression  Transesophageal Echocardiography Report ([facility])      Demographics      Patient Name   [facility]  Gender              Female                  K      MR #           [id number]      Race                                                    Ethnicity      Account #      [de-identified]      Room Number         7507      Accession      [id number]     Date of Study       06/11/2021   Number      Date of Birth  1960     Referring Physician Terri Canseco      Age            57 year(s)     Becky Perez RVT                                    Interpreting        Parmjit Phillips MD                                 Physician     Procedure     Type of Study      THERON procedure:ECHOCARDIOGRAM TRANSESOPHAGEAL.      Procedure Date  Date: 06/11/2021 Start: 03:35 PM     Study Location: Cath lab     Indications:Mitral regurgitation.     Additional Medical History:former smoker, bradycardia, anemia, diabetes,  COPD, GERD, coronary artery disease, PVD     Patient Status: Routine     Contrast Medium: Bubble Study.     Height: 61 inches Weight: 236.01 pounds BSA: 2.03 m^2 BMI: 44.59 kg/m^2     BP: 122/63 mmHg     Procedure Medications    - Fentanyl 100 mcg.       - Versed 2 mg.     Procedure Descriptor:-The transesophageal approach was used. -Probe inserted with no complications by cardiologist.  -The study included complete 2D imaging, complete spectral doppler, and  color doppler.  -Procedure performed without complications.     Allergies    - See Epic.      Tanja      Summary   Probe easily inserted by Cardiologist.   Procedure performed without complications. The study included complete 2D imaging, complete spectral doppler, and   color doppler. The transesophageal approach was used. Risk benefits and alternatives were explained to the patient and informed   consent was obtained. Ejection fraction was estimated at 50-55%. Left atrial size was severely dilated with no thrombus identified. APPENDAGE: The left atrial appendage size was normal with no thrombus   identified. DOPPLER: The function was normal (normal emptying velocity). ATRIAL SEPTUM: Small PFO identified by color and bubble study. The mitral valve structure was degenerated with restriction of the   posterior leaflet. DOPPLER: The transmitral velocity was within the normal   range with no evidence for mitral stenosis. There was severe mitral   regurgitation (mean gradient 2 mmHg). Mild tricuspid regurgitation. Signature      ----------------------------------------------------------------   Electronically signed by Paige Kaur MD (Interpreting   physician) on 06/11/2021 at 05:28 PM   ----------------------------------------------------------------      Findings      Mitral Valve   The mitral valve structure was degenerated with restriction of the   posterior leaflet. DOPPLER: The transmitral velocity was within the normal   range with no evidence for mitral stenosis. There was severe mitral   regurgitation (mean gradient 2 mmHg).       Aortic Valve   The aortic valve was trileaflet with normal thickness and cuspal   separation. There was no echocardiographic evidence of a vegetation. DOPPLER: Transaortic velocity was within the normal range with no evidence   of aortic stenosis, Mild aortic regurgitaiton. Tricuspid Valve   The tricuspid valve structure was normal with normal leaflet separation. There was no echocardiographic evidence of a vegetation. DOPPLER: There   was no evidence of tricuspid stenosis. Mild tricuspid regurgitation. Pulmonic Valve   The pulmonic valve leaflets exhibited normal thickness, no calcification,   and normal cuspal separation. There was no echocardiographic evidence of   vegetation. DOPPLER: The transpulmonic velocity was within the normal   range with no evidence for regurgitation. Left Atrium   Left atrial size was severely dilated with no thrombus identified. APPENDAGE: The left atrial appendage size was normal with no thrombus   identified. DOPPLER: The function was normal (normal emptying velocity). Left Ventricle   Normal left ventricular size and systolic function. There were no regional wall motion abnormalities. Wall thickness was within normal limits. Ejection fraction was estimated at 50-55%. Right Atrium   Right atrial size was normal with no thrombus identified. ATRIAL SEPTUM:   Small PFO identified by color and bubble study. Right Ventricle   The right ventricular size was normal with normal systolic function and   wall thickness. Pericardial Effusion   The pericardium was normal in appearance with no evidence of a pericardial   effusion. Prominent pericardial fat. Pleural Effusion   No evidence of pleural effusion. Aorta / Great Vessels   -Aortic root dimension within normal limits.   -The Pulmonary artery is within normal limits. -IVC size is within normal limits with normal respiratory phasic changes. Procedure Descriptor   -The transesophageal approach was used.    -Probe inserted with no complications by cardiologist.   -The study included complete 2D imaging, complete spectral doppler, and   color doppler.   -Procedure performed without complications. Doppler Measurements & Calculations      MV Mean Gradient: 2 mmHg   MV Mean Velocity: 59.1 cm/s      Vitals:    07/20/21 1138   BP: 91/61   Pulse: 65   SpO2: 100%   Weight: 239 lb 3.2 oz (108.5 kg)   Height: 5' (1.524 m)       ROS:   Constitutional: Negative for activity change, chills, fever and unexpected weight change. Positive for fatigue. Respiratory: Negative for apnea, wheezing and stridor. Positive for STRONG. Cardiovascular: Negative for chest pain, palpitations and leg swelling. Gastrointestinal: Negative for hematochezia, melana, constipation, and N/V/D. Musculoskeletal: Negative for myalgias  Skin: Negative for color change, rash and wound. Neurological: Negative for dizziness or syncope. Physical Exam:   General Appearance: alert ,conversing, in no acute distress  Cardiovascular: normal rate, regular rhythm, normal S1 and S2, Grade III/VI VIVEK THOMAS with no rubs, clicks, or gallops  Pulmonary/Chest: clear to auscultation bilaterally- no wheezes, rales or rhonchi, normal air movement, no respiratory distress  Abdomen:soft, non-tender, non-distended, normal bowel sounds, no bruits,   Extremities: no cyanosis, clubbing or edema. Skin: warm and dry, no rash or erythema  Head: normocephalic and atraumatic  Neck: supple and non-tender without mass, no thyromegaly, no JVD   Musculoskeletal: normal range of motion, no joint swelling, deformity or tenderness. Neurological: alert, oriented, normal speech, no focal findings or movement disorder noted. Capillary Refill: Brisk,< 3 seconds   Peripheral Pulses: +2 palpable, equal bilaterally     Active Problem List:  Patient Active Problem List   Diagnosis    GI bleed    Bradycardia    Anemia    SOB (shortness of breath)       Assessment:   1.  Severe Mitral Regurgitation    Plan: 7/20/21  3 57y.o. year-old female with severe symptomatic mitral regurgitation. Patient clearly is at high risk for surgical mitral valve replacement, for reasons of age, frality, STS score, and multiple co-morbidities including CAD, HTN, DM and COPD. Recommend proceed with evaluation for MitraClip. The plan of care was discussed in detail with Dr. Nate Mills.

## 2021-07-20 NOTE — PROGRESS NOTES
Heart Failure Clinic       Visit Date: 7/20/2021  Cardiologist:  Dr. Cara Guzman  Primary Care Physician: Dr. Sylvia Slaughter MD    Kemi García is a 57 y.o. female who presents today for:  Chief Complaint   Patient presents with    New Patient     CHF-  MitraClip eval       HPI:   Kemi García is a 57 y.o. female who presents to the office for a NEW patient visit in the heart failure clinic. Accompanied by , Linsloan Barber    TYPE HF: HFpEF (EF 50-55%)  Device: Pacer [facility] 6/15/2021)  HX: HTN, CAD s/p [facility]), DM, Asthma/COPD, Home O2 2L, 3L activity (Dr Vernon He) former smoker (hx 20pk/yr), CKD stg 3, Dementia, Depression, Neuropathy/dizziness -Dr Pramod De La Fuente    Hospitalization:  June 2021 - fatigue. Anemia - Hgb 9.8 (received 2U RBC at ). Bradycardia - Pacemaker placed  [facility] - patent coronaries - PCW 32, LVEDP 30  Scheduled for EGD/colonoscopy this Thursday - Dr Mart Kanner    Concerns today: Feeling overall better since discharge. But has chronic SOB, fatigue. Tires very easily - ongoing. LE edema improved.     Hx falls/dizziness - none recent  Activity: wheelchair = can only walk short distances (20-30ft) past 2-3 years    Patient has:  Chest Pain: no  SOB: yes - chronic  Orthopnea/PND: hospital bed - 30*, \"cant breath if lay flat\"  KELVIN: no  Edema: no  Fatigue: yes  Abdominal bloating: no  Cough: no  Appetite: no  Home weight: no  Home blood pressure: not check    Past Medical History:   Diagnosis Date    Agoraphobia     Anemia     Anxiety     Asthma     CAD (coronary artery disease)     COPD (chronic obstructive pulmonary disease) (HCC)     Dementia (HCC)     Depression     Diabetes mellitus (HCC)     GERD (gastroesophageal reflux disease)     Hypertension     Neuropathy     PTSD (post-traumatic stress disorder)     PVD (peripheral vascular disease) ([facility] [address]     Renal disease     Stage 3     Past Surgical History:   Procedure Laterality Date    CARDIAC CATHETERIZATION  CHOLECYSTECTOMY  2014    CORONARY ANGIOPLASTY WITH STENT PLACEMENT  2019    x2    ESOPHAGEAL MOTILITY STUDY Left 5/24/2019    ESOPHAGEAL MOTILITY/MANOMETRY STUDY performed by Altagracia Sotelo MD at [facility]    HAND SURGERY Right     HERNIA REPAIR  2016    HYSTERECTOMY, TOTAL ABDOMINAL  1982    OVARY REMOVAL Bilateral 1982    TONSILLECTOMY AND ADENOIDECTOMY      UPPER GASTROINTESTINAL ENDOSCOPY       History reviewed. No pertinent family history. Social History     Tobacco Use    Smoking status: Former Smoker     Packs/day: 0.50     Years: 40.00     Pack years: 20.00     Types: Cigarettes     Start date: 1/1/1970    Smokeless tobacco: Never Used   Substance Use Topics    Alcohol use: Not Currently     Current Outpatient Medications   Medication Sig Dispense Refill    hydrOXYzine (VISTARIL) 25 MG capsule Take 25 mg by mouth daily Prn      prasugrel (EFFIENT) 10 MG TABS Take 1 tablet by mouth daily 30 tablet 1    furosemide (LASIX) 20 MG tablet Take 1 tablet by mouth daily 60 tablet 3    metFORMIN (GLUCOPHAGE) 500 MG tablet Take 1 tablet by mouth 2 times daily (with meals) 180 tablet 1    pantoprazole (PROTONIX) 40 MG tablet Take 1 tablet by mouth every morning (before breakfast) 30 tablet 2    metoprolol succinate (TOPROL XL) 25 MG extended release tablet Take 12.5 mg by mouth daily      nystatin (MYCOSTATIN) 072512 UNIT/GM powder Apply topically 2 times daily Apply topically 2 times daily.  linaclotide (LINZESS) 145 MCG capsule Take 145 mcg by mouth every morning (before breakfast)      folic acid (FOLVITE) 1 MG tablet Take 1 mg by mouth daily      vitamin B-12 (CYANOCOBALAMIN) 1000 MCG tablet Take 1,000 mcg by mouth daily      ferrous sulfate (IRON 325) 325 (65 Fe) MG tablet Take 325 mg by mouth 2 times daily      traZODone (DESYREL) 100 MG tablet Take 100 mg by mouth nightly      gabapentin (NEURONTIN) 400 MG capsule Take 800 mg by mouth 3 times daily.       citalopram (CELEXA) 20 MG tablet Take 20 mg by mouth daily      isosorbide mononitrate (IMDUR) 30 MG extended release tablet Take 15 mg by mouth daily      buPROPion (WELLBUTRIN XL) 300 MG extended release tablet Take 300 mg by mouth every morning      memantine ER (NAMENDA XR) 28 MG CP24 extended release capsule Take 28 mg by mouth daily      atorvastatin (LIPITOR) 40 MG tablet Take 40 mg by mouth daily      Alcohol Swabs (ALCOHOL PREP) 70 % PADS Us e3 imes per day Diagnosis:  Diabetes Non-Insulin Dependent 100 each 11    blood glucose monitor strips Test 3 times a day & as needed for symptoms of irregular blood glucose. Dispense sufficient amount for indicated testing frequency plus additional to accommodate PRN testing needs. 100 strip 0    glucose monitoring kit (FREESTYLE) monitoring kit 1 kit by Does not apply route daily 1 kit 0    Exenatide ER (BYDUREON BCISE) 2 MG/0.85ML AUIJ Inject 0.85 mLs into the skin once a week (Patient not taking: Reported on 7/20/2021)      levothyroxine (SYNTHROID) 25 MCG tablet Take 25 mcg by mouth Daily (Patient not taking: Reported on 7/20/2021)       No current facility-administered medications for this visit. Allergies   Allergen Reactions    Adhesive Tape      Causes skin tears    Baclofen Other (See Comments)     All muscle relaxers, Lethargy    Donepezil     Norco [Hydrocodone-Acetaminophen] Other (See Comments)     Muscle weakness    Percocet [Oxycodone-Acetaminophen]     Tizanidine        SUBJECTIVE:   Review of Systems   Constitutional: Positive for fatigue. Negative for activity change and appetite change. Respiratory: Positive for shortness of breath (STRONG). Negative for cough. Cardiovascular: Negative for chest pain, palpitations and leg swelling. Gastrointestinal: Negative for abdominal distention. Neurological: Negative for weakness, light-headedness and headaches. Hematological: Negative for adenopathy.    Psychiatric/Behavioral: Negative for sleep disturbance. OBJECTIVE:   Today's Vitals:  BP 91/61   Pulse 65   Ht 5' (1.524 m)   Wt 239 lb 4.8 oz (108.5 kg)   SpO2 100%   BMI 46.74 kg/m²     Physical Exam  Vitals reviewed. Constitutional:       General: She is not in acute distress. Appearance: Normal appearance. She is well-developed. She is obese. She is not diaphoretic. HENT:      Head: Normocephalic and atraumatic. Eyes:      Conjunctiva/sclera: Conjunctivae normal.   Neck:      Comments: No JVD  Cardiovascular:      Rate and Rhythm: Normal rate and regular rhythm. Heart sounds: Normal heart sounds. No murmur heard. Pulmonary:      Effort: Pulmonary effort is normal. No respiratory distress. Breath sounds: Normal breath sounds. No wheezing or rales. Comments: O2 2L NC  Abdominal:      General: Bowel sounds are normal. There is no distension. Palpations: Abdomen is soft. Tenderness: There is no abdominal tenderness. Musculoskeletal:         General: Normal range of motion. Cervical back: Normal range of motion and neck supple. Right lower leg: Edema (puffy, nonpitting) present. Left lower leg: Edema (puffy, nonpitting) present. Skin:     General: Skin is warm and dry. Capillary Refill: Capillary refill takes less than 2 seconds. Neurological:      Mental Status: She is alert and oriented to person, place, and time. Coordination: Coordination normal.   Psychiatric:         Behavior: Behavior normal.         Wt Readings from Last 3 Encounters:   07/20/21 239 lb 4.8 oz (108.5 kg)   07/20/21 239 lb 3.2 oz (108.5 kg)   06/30/21 231 lb 9.6 oz (105.1 kg)     BP Readings from Last 3 Encounters:   07/20/21 91/61   07/20/21 91/61   06/30/21 107/72     Pulse Readings from Last 3 Encounters:   07/20/21 65   07/20/21 65   06/30/21 66     Body mass index is 46.74 kg/m².     ECHO:   Echo: 6/11/21  Summary   Probe easily inserted by Cardiologist.  Funmilayo Matute performed without complications.   The study included complete 2D imaging, complete spectral doppler, and   color doppler.   The transesophageal approach was used.   Risk benefits and alternatives were explained to the patient and informed   consent was obtained.   Ejection fraction was estimated at 50-55%.   Left atrial size was severely dilated with no thrombus identified.   APPENDAGE: The left atrial appendage size was normal with no thrombus   identified. DOPPLER: The function was normal (normal emptying velocity).  ATRIAL SEPTUM: Small PFO identified by color and bubble study.   The mitral valve structure was degenerated with restriction of the   posterior leaflet. DOPPLER: The transmitral velocity was within the normal   range with no evidence for mitral stenosis. There was severe mitral   regurgitation (mean gradient 2 mmHg).  Mild tricuspid regurgitation.      Signature      ----------------------------------------------------------------   Electronically signed by Cherylene Boys MD      CATH/STRESS:   Left Heart Cath: 6/13/21  RIGHT HEART CATHETERIZATION:  RA 26, RV 62/22, PA 67/30 with a mean of  41, wedge pressure 32 along with PA saturation of 64%.     CORONARY ANGIOGRAM:  LEFT MAIN:  Mild luminal irregularities but patent without any  significant obstruction.     LAD:  30% stenosis proximally and with a patent stent to the proximal  segment, distally has luminal irregularities with no significant  obstruction.  Small diagonal branch without any significant obstruction.     LCX:  Patent in the proximal segment.  No significant obstruction. Bifurcates into a small AV groove branch and a larger OM1 branch without  any significant obstruction.     RCA:  Luminal irregularities throughout without any significant  obstruction.  Bifurcates into a very small PLB and larger PDA.  RCA is  dominant.     LV:  LV systolic pressure is 327.  No significant LV to AO systolic  gradient.  LVEDP is 30.  LVEF is 45 to 50%.   Mild to moderate dilation. Aortic valve is tricuspid.   No significant aneurysm or dissection of  the visualized portion of the aorta.  There is 4+ mitral regurgitation  with reflux into the pulmonary veins.     IMMEDIATE COMPLICATIONS:  None.     MEDICATIONS:  See EMR.     ACCESS:  6-[place] Angio-Seal was used for hemostasis.     ESTIMATED BLOOD LOSS:  Less than 10 mL.     SUMMARY:  Severe mitral regurgitation; no significant coronary artery  disease; elevated right heart cath pressures with preserved cardiac  output.     PLAN:  1.  Bedrest.  2.  Optimal medical therapy. 3.  Risk factor management. 4.  Routine access site care. 5.  Guideline-directed therapy for acute heart failure. 6.  Uptitrate diuretics. 8.  Holding mitral valve workup as outpatient. 9.  CT Surgery consultation. 10.  Follow up with myself in two to four weeks postprocedure.     All the above was explained to the patient and the patient's family.    They are agreeable and amenable to the above plan.           Zaida Chris MD         Results reviewed:  BNP: No results found for: BNP  CBC:   Lab Results   Component Value Date    WBC 6.8 06/15/2021    RBC 2.75 06/15/2021    RBC 4.52 12/27/2017    HGB 8.4 06/15/2021    HCT 29.5 06/15/2021     06/15/2021     CMP:    Lab Results   Component Value Date     06/16/2021    K 4.0 06/16/2021     06/16/2021    CO2 28 06/16/2021    BUN 17 06/16/2021    CREATININE 1.0 06/16/2021    AGRATIO 1.1 12/27/2017    LABGLOM 56 06/16/2021    GLUCOSE 106 06/16/2021    GLUCOSE 107 12/27/2017    CALCIUM 8.7 06/16/2021     Hepatic Function Panel:    Lab Results   Component Value Date    ALKPHOS 91 06/09/2021    ALT 18 06/09/2021    AST 21 06/09/2021    PROT 6.7 06/09/2021    BILITOT 1.2 06/09/2021    BILITOT 0.7 12/27/2017    BILIDIR 0.2 12/27/2017    LABALBU 3.9 06/09/2021     Magnesium:    Lab Results   Component Value Date    MG 1.8 06/12/2021     PT/INR:    Lab Results   Component Value Date    INR 1.02 06/15/2021     Lipids:    Lab Results   Component Value Date    TRIG 124 12/27/2017       ASSESSMENT AND PLAN:   The patient's condition/symptoms are Stable: No clinical evidence of fluid overload today. Continue current medical regimen without changes at present time. Diagnosis Orders   1. CHF (congestive heart failure), NYHA class III, chronic, diastolic (HCC)  CBC    Basic Metabolic Panel    Brain Natriuretic Peptide   2. Nonrheumatic mitral valve regurgitation     3. Cardiac pacemaker     4. Anemia, unspecified type     5. Pulmonary emphysema, unspecified emphysema type ([address]     6. On home O2     7. Hypotension, unspecified hypotension type       Continue:  GDMT:   ACE/ARB/ARNI - None   BB - Toprol 12.5/day   Diuretic - Lasix 20/day  AA - none  Vasodilator - Imdur 15/day  Continue Current medications:  Effient, Fe sulfate  Stable, appears Euvolemic   Chronic STRONG - likely multifactorial d/t emphysema, severe MR  Recent pacemaker placed for bradycardia  Plan for MitraClip in Sept (allow Pacer heal, risk bleeding w/ Heparin bolus)  Labs reviewed - none since d/c from hospital in June   Scheduled for EGD/colonoscopy this Thursday  BMP, BNP, CBC next week. Consider adding Aldactone pending labs. BP runs low - (will be cautious)  Ok to proceed w/ MitraClip from CHF standpoint - appears Euvolemic on exam  F/U 2 weeks post or sooner if needed. Discussed diet/fluid/sodium at length - handouts given. Tolerating above noted HF meds, no ill side effects noted. Will continue to monitor kidney function and electrolytes. Will optimize as tolerated. Pt is compliant w/ medications. Total visit time of 40 minutes has been spent with patient on education of symptoms, management, medication, and plan of care; as well as review of chart: labs, ECHO, radiology reports, etc.   I personally spent more then 50% of the appt time face to face with the patient.   · Daily weights  · Fluid restriction of 2 Liters per day  · Limit sodium in diet to around 4628-7981 mg/day  · Monitor BP  · Activity as tolerated     Patient was instructed to call the [address] for any changes in symptoms as noted in AVS.      Return in about 3 months (around 10/20/2021). or sooner if needed     Patient given educational materials - see patient instructions. We discussed the importance of weighing oneself and recording daily. We also discussed the importance of a low sodium diet, higher sodium foods to avoid and better low sodium food options. Patient verbalizes understanding of plan of care using teach back method, and is agreeable to the treatment plan.        Electronically signed by SHERMAN Boles CNP on 7/20/2021 at 4:42 PM

## 2021-07-27 ENCOUNTER — TELEPHONE (OUTPATIENT)
Dept: CARDIOLOGY CLINIC | Age: 61
End: 2021-07-27

## 2021-07-27 NOTE — TELEPHONE ENCOUNTER
Dr. Bryn Duverney (caregiver) questioning if Sonia Gongora needs to continue her Effient? Patient was PCI'd x 2 in 2019 and has been recently having anemia issues. EGD and Colonoscopy was completed and dictation is scanned into the media tab. Studies were negitive. Please advise?

## 2021-07-30 ENCOUNTER — TELEPHONE (OUTPATIENT)
Dept: CARDIOLOGY CLINIC | Age: 61
End: 2021-07-30

## 2021-07-30 NOTE — TELEPHONE ENCOUNTER
RTC to Gail Bishop caregiver. LM to call back if needed, questions in regards to metformin should be directed to PCP.

## 2021-08-06 LAB
B-TYPE NATRIURETIC PEPTIDE: 146 PG/ML
BASOPHILS ABSOLUTE: ABNORMAL
BASOPHILS RELATIVE PERCENT: ABNORMAL
BUN BLDV-MCNC: 35 MG/DL
CALCIUM SERPL-MCNC: 9.6 MG/DL
CHLORIDE BLD-SCNC: 101 MMOL/L
CO2: 28 MMOL/L
CREAT SERPL-MCNC: 1.2 MG/DL
EOSINOPHILS ABSOLUTE: ABNORMAL
EOSINOPHILS RELATIVE PERCENT: ABNORMAL
GFR CALCULATED: 45
GLUCOSE BLD-MCNC: 151 MG/DL
HCT VFR BLD CALC: 28.8 % (ref 36–46)
HEMOGLOBIN: 9.8 G/DL (ref 12–16)
LYMPHOCYTES ABSOLUTE: ABNORMAL
LYMPHOCYTES RELATIVE PERCENT: ABNORMAL
MCH RBC QN AUTO: 31.4 PG
MCHC RBC AUTO-ENTMCNC: 33.9 G/DL
MCV RBC AUTO: 92.8 FL
MONOCYTES ABSOLUTE: ABNORMAL
MONOCYTES RELATIVE PERCENT: ABNORMAL
NEUTROPHILS ABSOLUTE: ABNORMAL
NEUTROPHILS RELATIVE PERCENT: ABNORMAL
PLATELET # BLD: 354 K/ΜL
PMV BLD AUTO: ABNORMAL FL
POTASSIUM SERPL-SCNC: 4.2 MMOL/L
RBC # BLD: 3.1 10^6/ΜL
SODIUM BLD-SCNC: 139 MMOL/L
WBC # BLD: 7.7 10^3/ML

## 2021-08-09 ENCOUNTER — TELEPHONE (OUTPATIENT)
Dept: CARDIOLOGY CLINIC | Age: 61
End: 2021-08-09

## 2021-08-10 NOTE — TELEPHONE ENCOUNTER
Spoke with patient   She would like Wicho Gonzalez notified because she will forget everything as soon as she hangs up the phone     Wicho Gonzalez notified she would like lab results sent to Dr. Elton Nieto forwarded to Dr. Matheus Alarcon office

## 2021-09-13 ENCOUNTER — TELEPHONE (OUTPATIENT)
Dept: CARDIOLOGY CLINIC | Age: 61
End: 2021-09-13

## 2021-09-13 DIAGNOSIS — I34.0 NONRHEUMATIC MITRAL VALVE REGURGITATION: Primary | ICD-10-CM

## 2021-09-13 NOTE — TELEPHONE ENCOUNTER
Orders received from Dr. Libertad Srivastava to schedule the patient for THERON prior to scheduling to moving forward with the MitraClip. Gaston Renteria, is there a way we could schedule this for this week?

## 2021-09-13 NOTE — TELEPHONE ENCOUNTER
[facility] will be picking the patient up at 0930 at her residence and on 9/20/2021 they will be picking her up at 0830 for the PFT. Patient has been notified.

## 2021-09-15 ENCOUNTER — HOSPITAL ENCOUNTER (OUTPATIENT)
Dept: NURSING | Age: 61
End: 2021-09-15
Payer: MEDICARE

## 2021-09-15 ENCOUNTER — HOSPITAL ENCOUNTER (OUTPATIENT)
Age: 61
Discharge: HOME | End: 2021-09-16
Attending: INTERNAL MEDICINE | Admitting: INTERNAL MEDICINE
Payer: MEDICARE

## 2021-09-15 DIAGNOSIS — I34.0 NONRHEUMATIC MITRAL VALVE REGURGITATION: ICD-10-CM

## 2021-09-15 LAB
ERYTHROCYTE [DISTWIDTH] IN BLOOD BY AUTOMATED COUNT: 14.3 % (ref 11.5–14.5)
ERYTHROCYTE [DISTWIDTH] IN BLOOD BY AUTOMATED COUNT: 54.4 FL (ref 35–45)
GLUCOSE BLD-MCNC: 198 MG/DL (ref 70–108)
HCT VFR BLD CALC: 31.3 % (ref 37–47)
HEMOGLOBIN: 9.6 GM/DL (ref 12–16)
INR BLD: 0.83 (ref 0.85–1.13)
LV EF: 53 %
LVEF MODALITY: NORMAL
MCH RBC QN AUTO: 31.9 PG (ref 26–33)
MCHC RBC AUTO-ENTMCNC: 30.7 GM/DL (ref 32.2–35.5)
MCV RBC AUTO: 104 FL (ref 81–99)
PLATELET # BLD: 265 THOU/MM3 (ref 130–400)
PMV BLD AUTO: 10 FL (ref 9.4–12.4)
RBC # BLD: 3.01 MILL/MM3 (ref 4.2–5.4)
WBC # BLD: 9.9 THOU/MM3 (ref 4.8–10.8)

## 2021-09-15 PROCEDURE — 93312 ECHO TRANSESOPHAGEAL: CPT

## 2021-09-15 PROCEDURE — 2709999900 HC NON-CHARGEABLE SUPPLY: Performed by: INTERNAL MEDICINE

## 2021-09-15 PROCEDURE — 36415 COLL VENOUS BLD VENIPUNCTURE: CPT

## 2021-09-15 PROCEDURE — 6370000000 HC RX 637 (ALT 250 FOR IP): Performed by: INTERNAL MEDICINE

## 2021-09-15 PROCEDURE — 93306 TTE W/DOPPLER COMPLETE: CPT

## 2021-09-15 PROCEDURE — 85027 COMPLETE CBC AUTOMATED: CPT

## 2021-09-15 PROCEDURE — 85610 PROTHROMBIN TIME: CPT

## 2021-09-15 PROCEDURE — 93320 DOPPLER ECHO COMPLETE: CPT

## 2021-09-15 PROCEDURE — 6370000000 HC RX 637 (ALT 250 FOR IP)

## 2021-09-15 PROCEDURE — 2580000003 HC RX 258: Performed by: PHYSICIAN ASSISTANT

## 2021-09-15 PROCEDURE — 7100000010 HC PHASE II RECOVERY - FIRST 15 MIN: Performed by: INTERNAL MEDICINE

## 2021-09-15 PROCEDURE — 93325 DOPPLER ECHO COLOR FLOW MAPG: CPT

## 2021-09-15 PROCEDURE — 82948 REAGENT STRIP/BLOOD GLUCOSE: CPT

## 2021-09-15 PROCEDURE — 6360000002 HC RX W HCPCS: Performed by: INTERNAL MEDICINE

## 2021-09-15 PROCEDURE — 7100000011 HC PHASE II RECOVERY - ADDTL 15 MIN: Performed by: INTERNAL MEDICINE

## 2021-09-15 PROCEDURE — 93312 ECHO TRANSESOPHAGEAL: CPT | Performed by: INTERNAL MEDICINE

## 2021-09-15 RX ORDER — LANOLIN ALCOHOL/MO/W.PET/CERES
1000 CREAM (GRAM) TOPICAL DAILY
Status: DISCONTINUED | OUTPATIENT
Start: 2021-09-15 | End: 2021-09-16 | Stop reason: HOSPADM

## 2021-09-15 RX ORDER — FUROSEMIDE 20 MG/1
20 TABLET ORAL DAILY
Status: DISCONTINUED | OUTPATIENT
Start: 2021-09-15 | End: 2021-09-16 | Stop reason: HOSPADM

## 2021-09-15 RX ORDER — ISOSORBIDE MONONITRATE 30 MG/1
15 TABLET, EXTENDED RELEASE ORAL DAILY
Status: DISCONTINUED | OUTPATIENT
Start: 2021-09-15 | End: 2021-09-16 | Stop reason: HOSPADM

## 2021-09-15 RX ORDER — MIDAZOLAM HYDROCHLORIDE 1 MG/ML
INJECTION INTRAMUSCULAR; INTRAVENOUS PRN
Status: DISCONTINUED | OUTPATIENT
Start: 2021-09-15 | End: 2021-09-15 | Stop reason: ALTCHOICE

## 2021-09-15 RX ORDER — SODIUM CHLORIDE 9 MG/ML
25 INJECTION, SOLUTION INTRAVENOUS PRN
Status: DISCONTINUED | OUTPATIENT
Start: 2021-09-15 | End: 2021-09-15 | Stop reason: SDUPTHER

## 2021-09-15 RX ORDER — TRAZODONE HYDROCHLORIDE 100 MG/1
100 TABLET ORAL NIGHTLY
Status: DISCONTINUED | OUTPATIENT
Start: 2021-09-15 | End: 2021-09-16 | Stop reason: HOSPADM

## 2021-09-15 RX ORDER — SODIUM CHLORIDE 9 MG/ML
INJECTION, SOLUTION INTRAVENOUS CONTINUOUS
Status: DISCONTINUED | OUTPATIENT
Start: 2021-09-15 | End: 2021-09-16 | Stop reason: HOSPADM

## 2021-09-15 RX ORDER — METOPROLOL SUCCINATE 25 MG/1
12.5 TABLET, EXTENDED RELEASE ORAL DAILY
Status: DISCONTINUED | OUTPATIENT
Start: 2021-09-15 | End: 2021-09-16 | Stop reason: HOSPADM

## 2021-09-15 RX ORDER — FENTANYL CITRATE 50 UG/ML
INJECTION, SOLUTION INTRAMUSCULAR; INTRAVENOUS PRN
Status: DISCONTINUED | OUTPATIENT
Start: 2021-09-15 | End: 2021-09-15 | Stop reason: ALTCHOICE

## 2021-09-15 RX ORDER — SODIUM CHLORIDE 0.9 % (FLUSH) 0.9 %
5-40 SYRINGE (ML) INJECTION EVERY 12 HOURS SCHEDULED
Status: DISCONTINUED | OUTPATIENT
Start: 2021-09-15 | End: 2021-09-15 | Stop reason: SDUPTHER

## 2021-09-15 RX ORDER — BUPROPION HYDROCHLORIDE 300 MG/1
300 TABLET ORAL EVERY MORNING
Status: DISCONTINUED | OUTPATIENT
Start: 2021-09-16 | End: 2021-09-16 | Stop reason: HOSPADM

## 2021-09-15 RX ORDER — ACETAMINOPHEN 500 MG
500 TABLET ORAL EVERY 6 HOURS PRN
Status: DISCONTINUED | OUTPATIENT
Start: 2021-09-15 | End: 2021-09-16 | Stop reason: HOSPADM

## 2021-09-15 RX ORDER — SODIUM CHLORIDE 9 MG/ML
25 INJECTION, SOLUTION INTRAVENOUS PRN
Status: DISCONTINUED | OUTPATIENT
Start: 2021-09-15 | End: 2021-09-16 | Stop reason: HOSPADM

## 2021-09-15 RX ORDER — FERROUS SULFATE 325(65) MG
325 TABLET ORAL 2 TIMES DAILY
Status: DISCONTINUED | OUTPATIENT
Start: 2021-09-15 | End: 2021-09-16 | Stop reason: HOSPADM

## 2021-09-15 RX ORDER — HYDROXYZINE PAMOATE 25 MG/1
25 CAPSULE ORAL DAILY
Status: DISCONTINUED | OUTPATIENT
Start: 2021-09-15 | End: 2021-09-16 | Stop reason: HOSPADM

## 2021-09-15 RX ORDER — SODIUM CHLORIDE 0.9 % (FLUSH) 0.9 %
5-40 SYRINGE (ML) INJECTION PRN
Status: DISCONTINUED | OUTPATIENT
Start: 2021-09-15 | End: 2021-09-15 | Stop reason: SDUPTHER

## 2021-09-15 RX ORDER — SODIUM CHLORIDE 0.9 % (FLUSH) 0.9 %
5-40 SYRINGE (ML) INJECTION PRN
Status: DISCONTINUED | OUTPATIENT
Start: 2021-09-15 | End: 2021-09-16 | Stop reason: HOSPADM

## 2021-09-15 RX ORDER — PRASUGREL 10 MG/1
10 TABLET, FILM COATED ORAL DAILY
Status: DISCONTINUED | OUTPATIENT
Start: 2021-09-15 | End: 2021-09-16 | Stop reason: HOSPADM

## 2021-09-15 RX ORDER — ATORVASTATIN CALCIUM 40 MG/1
40 TABLET, FILM COATED ORAL NIGHTLY
Status: DISCONTINUED | OUTPATIENT
Start: 2021-09-15 | End: 2021-09-16 | Stop reason: HOSPADM

## 2021-09-15 RX ORDER — PANTOPRAZOLE SODIUM 40 MG/1
40 TABLET, DELAYED RELEASE ORAL
Status: DISCONTINUED | OUTPATIENT
Start: 2021-09-16 | End: 2021-09-16 | Stop reason: HOSPADM

## 2021-09-15 RX ORDER — LEVOTHYROXINE SODIUM 0.03 MG/1
25 TABLET ORAL DAILY
Status: DISCONTINUED | OUTPATIENT
Start: 2021-09-16 | End: 2021-09-16 | Stop reason: HOSPADM

## 2021-09-15 RX ORDER — FOLIC ACID 1 MG/1
1 TABLET ORAL DAILY
Status: DISCONTINUED | OUTPATIENT
Start: 2021-09-15 | End: 2021-09-16 | Stop reason: HOSPADM

## 2021-09-15 RX ORDER — GABAPENTIN 400 MG/1
800 CAPSULE ORAL NIGHTLY
Status: DISCONTINUED | OUTPATIENT
Start: 2021-09-15 | End: 2021-09-16 | Stop reason: HOSPADM

## 2021-09-15 RX ORDER — CITALOPRAM 20 MG/1
20 TABLET ORAL DAILY
Status: DISCONTINUED | OUTPATIENT
Start: 2021-09-15 | End: 2021-09-16 | Stop reason: HOSPADM

## 2021-09-15 RX ORDER — SODIUM CHLORIDE 0.9 % (FLUSH) 0.9 %
5-40 SYRINGE (ML) INJECTION EVERY 12 HOURS SCHEDULED
Status: DISCONTINUED | OUTPATIENT
Start: 2021-09-15 | End: 2021-09-16 | Stop reason: HOSPADM

## 2021-09-15 RX ADMIN — FERROUS SULFATE TAB 325 MG (65 MG ELEMENTAL FE) 325 MG: 325 (65 FE) TAB at 21:42

## 2021-09-15 RX ADMIN — SODIUM CHLORIDE: 9 INJECTION, SOLUTION INTRAVENOUS at 18:00

## 2021-09-15 RX ADMIN — SODIUM CHLORIDE: 9 INJECTION, SOLUTION INTRAVENOUS at 12:52

## 2021-09-15 RX ADMIN — CITALOPRAM 20 MG: 20 TABLET, FILM COATED ORAL at 21:41

## 2021-09-15 RX ADMIN — METOPROLOL SUCCINATE 12.5 MG: 25 TABLET, FILM COATED, EXTENDED RELEASE ORAL at 21:42

## 2021-09-15 RX ADMIN — TRAZODONE HYDROCHLORIDE 100 MG: 100 TABLET ORAL at 21:39

## 2021-09-15 RX ADMIN — ATORVASTATIN CALCIUM 40 MG: 40 TABLET, FILM COATED ORAL at 21:42

## 2021-09-15 RX ADMIN — Medication 1000 MCG: at 21:42

## 2021-09-15 RX ADMIN — HYDROXYZINE PAMOATE 25 MG: 25 CAPSULE ORAL at 21:40

## 2021-09-15 RX ADMIN — ISOSORBIDE MONONITRATE 15 MG: 30 TABLET ORAL at 21:41

## 2021-09-15 RX ADMIN — FOLIC ACID 1 MG: 1 TABLET ORAL at 21:42

## 2021-09-15 RX ADMIN — GABAPENTIN 800 MG: 400 CAPSULE ORAL at 21:41

## 2021-09-15 RX ADMIN — ACETAMINOPHEN 500 MG: 500 TABLET ORAL at 21:44

## 2021-09-15 ASSESSMENT — PAIN - FUNCTIONAL ASSESSMENT
PAIN_FUNCTIONAL_ASSESSMENT: 0-10
PAIN_FUNCTIONAL_ASSESSMENT: ACTIVITIES ARE NOT PREVENTED

## 2021-09-15 ASSESSMENT — PAIN SCALES - GENERAL
PAINLEVEL_OUTOF10: 4
PAINLEVEL_OUTOF10: 0

## 2021-09-15 NOTE — PROGRESS NOTES
Report called to [facility] on [address]. Transport requested.

## 2021-09-15 NOTE — TELEPHONE ENCOUNTER
Per Dr. Akiko Boyd this patient does not qualify for a MitraClip procedure. PFT and 6 minute walk test canceled and patient will be notified.

## 2021-09-15 NOTE — H&P
to proceed; the consent form was signed. MEDICAL HISTORY  [x]ASHD/ANGINA/MI/CHF   [x]Hypertension  []Diabetes  []Hyperlipidemia  []Smoking  []Family Hx of ASHD  [x]Additional information:       has a past medical history of Agoraphobia, Anemia, Anxiety, Asthma, CAD (coronary artery disease), COPD (chronic obstructive pulmonary disease) ([facility] [address] Dementia ([facility] [address] Depression, Diabetes mellitus ([facility] [address] GERD (gastroesophageal reflux disease), Hypertension, Neuropathy, PTSD (post-traumatic stress disorder), PVD (peripheral vascular disease) ([facility] [address] and Renal disease. SURGICAL HISTORY   has a past surgical history that includes Upper gastrointestinal endoscopy; esophageal motility study (Left, 5/24/2019); hernia repair (2016); Cholecystectomy (2014); Tonsillectomy and adenoidectomy; Hand surgery (Right); Cardiac catheterization; Coronary angioplasty with stent (2019); Hysterectomy, total abdominal (1982); Ovary removal (Bilateral, 1982); and pacemaker placement.   Additional information:       ALLERGIES   Allergies as of 09/14/2021 - Fully Reviewed 07/20/2021   Allergen Reaction Noted    Adhesive tape  06/09/2021    Baclofen Other (See Comments) 06/09/2021    Donepezil  06/09/2021    Norco [hydrocodone-acetaminophen] Other (See Comments) 06/09/2021    Percocet [oxycodone-acetaminophen]  06/09/2021    Tizanidine  06/09/2021     Additional information:       MEDICATIONS   Aspirin  [x] 81 mg  [] 325 mg  [] None  Antiplatelet drug therapy use last 5 days  []No [x]Yes  Coumadin Use Last 5 Days [x]No []Yes  Other anticoagulant use last 5 days  [x]No []Yes    Current Facility-Administered Medications:     0.9 % sodium chloride infusion, , IntraVENous, Continuous, Josie Linder PA-C    0.9 % sodium chloride infusion, 25 mL, IntraVENous, PRN, Willis Florian PA-C    sodium chloride flush 0.9 % injection 5-40 mL, 5-40 mL, IntraVENous, 2 times per day, Willis Florian PA-C    sodium chloride flush 0.9 % injection 5-40 mL, 5-40 mL, IntraVENous, PRN, Titi Flores PA-C  Prior to Admission medications    Medication Sig Start Date End Date Taking? Authorizing Provider   hydrOXYzine (VISTARIL) 25 MG capsule Take 25 mg by mouth daily Prn   Yes Historical Provider, MD   prasugrel (EFFIENT) 10 MG TABS Take 1 tablet by mouth daily 6/20/21  Yes Jazmin Burton MD   pantoprazole (PROTONIX) 40 MG tablet Take 1 tablet by mouth every morning (before breakfast) 6/13/21  Yes Willis Mora APRN - CNP   metoprolol succinate (TOPROL XL) 25 MG extended release tablet Take 12.5 mg by mouth daily   Yes Historical Provider, MD   nystatin (MYCOSTATIN) 187317 UNIT/GM powder Apply topically 2 times daily Apply topically 2 times daily. Yes Historical Provider, MD   vitamin B-12 (CYANOCOBALAMIN) 1000 MCG tablet Take 1,000 mcg by mouth daily   Yes Historical Provider, MD   ferrous sulfate (IRON 325) 325 (65 Fe) MG tablet Take 325 mg by mouth 2 times daily   Yes Historical Provider, MD   traZODone (DESYREL) 100 MG tablet Take 100 mg by mouth nightly   Yes Historical Provider, MD   gabapentin (NEURONTIN) 400 MG capsule Take 800 mg by mouth nightly.     Yes Historical Provider, MD   isosorbide mononitrate (IMDUR) 30 MG extended release tablet Take 15 mg by mouth daily   Yes Historical Provider, MD   buPROPion (WELLBUTRIN XL) 300 MG extended release tablet Take 300 mg by mouth every morning   Yes Historical Provider, MD   memantine ER (NAMENDA XR) 28 MG CP24 extended release capsule Take 28 mg by mouth daily   Yes Historical Provider, MD   levothyroxine (SYNTHROID) 25 MCG tablet Take 25 mcg by mouth Daily    Yes Historical Provider, MD   atorvastatin (LIPITOR) 40 MG tablet Take 40 mg by mouth daily   Yes Historical Provider, MD   furosemide (LASIX) 20 MG tablet Take 1 tablet by mouth daily 6/16/21   Jazmin Burton MD   metFORMIN (GLUCOPHAGE) 500 MG tablet Take 1 tablet by mouth 2 times daily (with meals)  Patient taking differently: Take 500 mg by mouth 2 times daily (with meals) 500 mg in the AM  1000 mg at bedtime 6/16/21   Eliza Carpenter MD   Alcohol Swabs (ALCOHOL PREP) 70 % PADS Us e3 imes per day Diagnosis:  Diabetes Non-Insulin Dependent 6/16/21   Eliza Carpenter MD   blood glucose monitor strips Test 3 times a day & as needed for symptoms of irregular blood glucose. Dispense sufficient amount for indicated testing frequency plus additional to accommodate PRN testing needs. 6/16/21   Eliza Carpenter MD   glucose monitoring kit (FREESTYLE) monitoring kit 1 kit by Does not apply route daily 6/16/21   Eliza Carpenter MD   folic acid (FOLVITE) 1 MG tablet Take 1 mg by mouth daily    Historical Provider, MD   Exenatide ER (BYDUREON BCISE) 2 MG/0.85ML AUIJ Inject 0.85 mLs into the skin once a week  Patient not taking: Reported on 7/20/2021    Historical Provider, MD   citalopram (CELEXA) 20 MG tablet Take 20 mg by mouth daily    Historical Provider, MD     Additional information:       VITAL SIGNS   Vitals:    09/15/21 1106   BP:    Pulse:    Resp:    Temp:    SpO2: 98%       PHYSICAL:   General: No acute distress  HEENT:  Unremarkable for age  Neck: without increased JVD, carotid pulses 2+ bilaterally without bruits  Heart: RRR, S1 & S2 WNL, S4 gallop, 2-3/6 HSM   NYHA 3  Lungs: Clear to auscultation    Abdomen: BS present, without HSM, masses, or tenderness    Extremities: without C,C,E.  Pulses 2+ bilaterally  Mental Status: Alert & Oriented        PLANNED PROCEDURE   []Cath  []PCI                []Pacemaker/AICD  [x]THERON             []Cardioversion []Peripheral angiography/PTA  []Other:      SEDATION  Planned agent:[x]Midazolam []Meperidine [x]Sublimaze []Morphine  []Diazepam  []Other:     ASA Classification:  []1 []2 [x]3 []4 []5  Class 1: A normal healthy patient  Class 2: Pt with mild to moderate systemic disease  Class 3: Severe systemic disease or disturbance  Class 4: Severe systemic disorders that are already life threatening.   Class 5: Moribund pt

## 2021-09-15 NOTE — PROGRESS NOTES
Recovery mode. Patient denies discomfort.  discussed findings with patient. Waiting for inpatient bed.

## 2021-09-16 VITALS
BODY MASS INDEX: 44.45 KG/M2 | DIASTOLIC BLOOD PRESSURE: 57 MMHG | TEMPERATURE: 98.6 F | SYSTOLIC BLOOD PRESSURE: 124 MMHG | WEIGHT: 235.4 LBS | OXYGEN SATURATION: 99 % | RESPIRATION RATE: 18 BRPM | HEIGHT: 61 IN | HEART RATE: 69 BPM

## 2021-09-16 PROCEDURE — 6370000000 HC RX 637 (ALT 250 FOR IP): Performed by: INTERNAL MEDICINE

## 2021-09-16 RX ADMIN — Medication 1000 MCG: at 10:40

## 2021-09-16 RX ADMIN — BUPROPION HYDROCHLORIDE 300 MG: 300 TABLET, EXTENDED RELEASE ORAL at 10:40

## 2021-09-16 RX ADMIN — METOPROLOL SUCCINATE 12.5 MG: 25 TABLET, FILM COATED, EXTENDED RELEASE ORAL at 10:45

## 2021-09-16 RX ADMIN — LEVOTHYROXINE SODIUM 25 MCG: 0.03 TABLET ORAL at 10:39

## 2021-09-16 RX ADMIN — CITALOPRAM 20 MG: 20 TABLET, FILM COATED ORAL at 10:41

## 2021-09-16 RX ADMIN — FERROUS SULFATE TAB 325 MG (65 MG ELEMENTAL FE) 325 MG: 325 (65 FE) TAB at 10:42

## 2021-09-16 RX ADMIN — ISOSORBIDE MONONITRATE 15 MG: 30 TABLET ORAL at 10:46

## 2021-09-16 RX ADMIN — PANTOPRAZOLE SODIUM 40 MG: 40 TABLET, DELAYED RELEASE ORAL at 10:43

## 2021-09-16 RX ADMIN — HYDROXYZINE PAMOATE 25 MG: 25 CAPSULE ORAL at 10:43

## 2021-09-16 RX ADMIN — PRASUGREL 10 MG: 10 TABLET, FILM COATED ORAL at 10:42

## 2021-09-16 RX ADMIN — FOLIC ACID 1 MG: 1 TABLET ORAL at 10:41

## 2021-09-16 ASSESSMENT — PAIN SCALES - GENERAL
PAINLEVEL_OUTOF10: 0
PAINLEVEL_OUTOF10: 0

## 2021-09-16 NOTE — PROGRESS NOTES
Patient discharged with all belongings, AVS and green discharge folder. Denied needs or questions.  Left via wheelchair to discharge doors and transported home via [facility]

## 2021-09-16 NOTE — CARE COORDINATION
9/16/21, 10:19 AM EDT  DISCHARGE PLANNING EVALUATION:    Moshe Littlejohn       Admitted: 9/15/2021/ [facility] day: 0   Location: -12/012-A Reason for admit: Severe mitral regurgitation [I34.0]   PMH:  has a past medical history of Agoraphobia, Anemia, Anxiety, Asthma, CAD (coronary artery disease), COPD (chronic obstructive pulmonary disease) ([address] Dementia ([address] Depression, Diabetes mellitus ([address] GERD (gastroesophageal reflux disease), Hypertension, Neuropathy, PTSD (post-traumatic stress disorder), PVD (peripheral vascular disease) ([facility] [address] and Renal disease. Procedure: 9/15/21 THERON  Barriers to Discharge:  Is being discharged to home today. Was required to have someone with her overnight and had no one to stay with her. She was kept on 7K as an outpatient in a bed for that reason. Oxygen on. PCP: Caden Ramírez MD   %    Patient Goals/Plan/Treatment Preferences: Susan said that she is going home today - requesting [employer] transport. Has home oxygen and has portable concentrator here. Denied need for [facility]. No other discharge needs voiced. Transportation/Food Security/Housekeeping Addressed:  No issues identified. 9/16/21, 10:27 AM EDT    Patient goals/plan/ treatment preferences discussed by  and . Patient goals/plan/ treatment preferences reviewed with patient/ family. Patient/ family verbalize understanding of discharge plan and are in agreement with goal/plan/treatment preferences. Understanding was demonstrated using the teach back method. AVS provided by RN at time of discharge, which includes all necessary medical information pertaining to the patients current course of illness, treatment, post-discharge goals of care, and treatment preferences.

## 2021-09-16 NOTE — PROGRESS NOTES
Pt is awake in bed in semi-kolb's position. Pt is alert and oriented x4, pupils PERRLA. Pt follows verbal commands and speech is clear and appropriate for pt age. Pt has moderate and equal hand grasps, strong and equal pedal push and pull. Heart sounds are regular rate and rhythm. Pt lungs are clear bilaterally, pt is on 2L of O2 which she also wears at home. Breathing is normal depth and unlabored. Pt has active bowel sounds in all four quadrants. She has just ordered her lunch because she had a late breakfast. Pt's upper and lower extremities are warm, dry, and normal color for ethnicity. There is no notable swelling in the upper extremities. Lower extremities show +2 non-pitting edema. Pt will be going home today, just waiting for a call back from the doctor for her follow-up appointment. Pt had c/o some pain in the coccyx area, there is no redness. Pt was reminded to continue turning and is able to turn with no assistance.

## 2021-09-16 NOTE — PROGRESS NOTES
Pt is awake in bed in semi-kolb's position. Pt is alert and oriented x4, pupils PERRLA. Pt follows verbal commands and has appropriate speech for age. Pt had moderate and equal  Hand  and strong and equal pedal push and pull. Arm drift is negative. Pt has regular heart sounds and rhythm. Pt lung sounds are clear bilaterally. Pt is on 2L O2 and breathing is unlabored. Mucous membranes are pink and moist. Pt has active bowel sounds in all four quadrants. Pt's upper extremities are warm, dry, and proper color for ethnicity with no notable edema. Lower extremities are warm, dry, and proper color for ethnicity with notable +2 non-pitting edema in the left and right lower extremities. Pt was up to the bathroom at this time and had one voiding occurrence.

## 2021-10-06 ENCOUNTER — TELEPHONE (OUTPATIENT)
Dept: CARDIOLOGY CLINIC | Age: 61
End: 2021-10-06

## 2021-10-06 NOTE — TELEPHONE ENCOUNTER
PT LM ON NURSE LINE THAT SHE IS CANCELLING HER PACER CHECK APPT FOR TOMORROW AND SHE WILL CALL US BACK TO R/S

## 2021-11-23 ENCOUNTER — TELEPHONE (OUTPATIENT)
Dept: CARDIOLOGY CLINIC | Age: 61
End: 2021-11-23

## 2021-11-23 NOTE — TELEPHONE ENCOUNTER
Pt LM on nurse line wanting refills on Metoprolol and Isosorbide. Pt mentioned concerns about cutting these meds in half and was told that she should not be cutting extended release tablets in half and wanted Dr. Mary Ortiz recommendation if it is still ok for her to take these meds at half doses. Please advise.

## 2022-01-13 ENCOUNTER — NURSE ONLY (OUTPATIENT)
Dept: CARDIOLOGY CLINIC | Age: 62
End: 2022-01-13
Payer: MEDICARE

## 2022-01-13 ENCOUNTER — OFFICE VISIT (OUTPATIENT)
Dept: CARDIOLOGY CLINIC | Age: 62
End: 2022-01-13
Payer: MEDICARE

## 2022-01-13 VITALS
WEIGHT: 248 LBS | HEART RATE: 60 BPM | SYSTOLIC BLOOD PRESSURE: 136 MMHG | HEIGHT: 60 IN | DIASTOLIC BLOOD PRESSURE: 82 MMHG | BODY MASS INDEX: 48.69 KG/M2

## 2022-01-13 DIAGNOSIS — Z95.0 PACEMAKER: ICD-10-CM

## 2022-01-13 DIAGNOSIS — I50.32 CHRONIC DIASTOLIC CHF (CONGESTIVE HEART FAILURE), NYHA CLASS 2 (HCC): Primary | ICD-10-CM

## 2022-01-13 DIAGNOSIS — I34.0 MILD MITRAL REGURGITATION: ICD-10-CM

## 2022-01-13 DIAGNOSIS — I27.20 PULMONARY HYPERTENSION (HCC): ICD-10-CM

## 2022-01-13 DIAGNOSIS — Z95.0 CARDIAC PACEMAKER IN SITU: ICD-10-CM

## 2022-01-13 DIAGNOSIS — I25.10 CORONARY ARTERY DISEASE INVOLVING NATIVE CORONARY ARTERY OF NATIVE HEART WITHOUT ANGINA PECTORIS: ICD-10-CM

## 2022-01-13 PROCEDURE — G8417 CALC BMI ABV UP PARAM F/U: HCPCS | Performed by: INTERNAL MEDICINE

## 2022-01-13 PROCEDURE — G8427 DOCREV CUR MEDS BY ELIG CLIN: HCPCS | Performed by: INTERNAL MEDICINE

## 2022-01-13 PROCEDURE — G8484 FLU IMMUNIZE NO ADMIN: HCPCS | Performed by: INTERNAL MEDICINE

## 2022-01-13 PROCEDURE — 99214 OFFICE O/P EST MOD 30 MIN: CPT | Performed by: INTERNAL MEDICINE

## 2022-01-13 PROCEDURE — 1036F TOBACCO NON-USER: CPT | Performed by: INTERNAL MEDICINE

## 2022-01-13 PROCEDURE — 93280 PM DEVICE PROGR EVAL DUAL: CPT | Performed by: INTERNAL MEDICINE

## 2022-01-13 PROCEDURE — 3017F COLORECTAL CA SCREEN DOC REV: CPT | Performed by: INTERNAL MEDICINE

## 2022-01-13 RX ORDER — FUROSEMIDE 40 MG/1
40 TABLET ORAL DAILY
Qty: 90 TABLET | Refills: 1 | Status: SHIPPED | OUTPATIENT
Start: 2022-01-13 | End: 2022-02-01

## 2022-01-13 RX ORDER — SPIRONOLACTONE 25 MG/1
25 TABLET ORAL DAILY
Qty: 90 TABLET | Refills: 3 | Status: SHIPPED | OUTPATIENT
Start: 2022-01-13

## 2022-01-13 RX ORDER — RAMELTEON 8 MG/1
8 TABLET ORAL NIGHTLY
COMMUNITY
End: 2022-09-13

## 2022-01-13 NOTE — PATIENT INSTRUCTIONS
You may receive a survey regarding the care you received during your visit. Your input is valuable to us. We encourage you to complete and return your survey.   We hope you will choose us in the future for your healthcare needs

## 2022-01-13 NOTE — PROGRESS NOTES
medtronic dual pacer     . Brandon Frederick Battery longevity:  13.5 years on device   Presenting rhythm  AP VS     Atrial impedance 513  RV impedance 418    P wave sensing 1.6  R wave sensing 15.9    61.8 % atrial paced  3 % RV paced     Atrial threshold 0.75 V  at 0.4ms  RV threshold 1 V at 0.4ms  Mode switches   0

## 2022-01-13 NOTE — PROGRESS NOTES
Follow-up 6 months. Patient has a pacemaker and states she has felt so good since it has been placed. She denies having any chest pain, shortness of breath or palpitations. She has intermittent dizziness. THERON on 9/15/2021-moderate MR. Effient was on med list and she states she has been off for 3 years. Patient isn't on ASA either.

## 2022-01-13 NOTE — PROGRESS NOTES
[address]  [place] [address]  Dept: [phone]  Dept Fax: [phone]  Loc: [phone]    Visit Date: 1/13/2022    Ms. Jose Tamayo is a 57 y.o. female  who presented for:  Chief Complaint   Patient presents with    6 Month Follow-Up    Congestive Heart Failure       HPI:     Destinee Barlow is a 57 y.o. female with PMHx of COPD on chronic oxygen therapy, anxiety, NIDDM2, hypothyroidism, HLD, severe anemia, GERD, CAD who is here for 6 month follow-up. The patient stated that she is searching for a new PCP and has not been taking her Lasix, Toprol XL, or Imdur because she has had misinformation on which dosage and frequency she should be taking these medications. According to her, her PCP has not been able to help with guidance. She denies fever, chills, headache, lightheadedness, change in vision, rhinorrhea, congestion, sore throat, cough, hemoptysis, chest pain, palpitations, PND, abdominal pain, nausea, vomiting, hematemesis, change in bowel/bladder habits, hematochezia, hematuria, weakness, difficulty with ambulation, numbness/tingling, or known exposure to sick contacts. She admits to chronic shortness of breath and STRONG only with walking long distances.            Current Outpatient Medications:     ramelteon (ROZEREM) 8 MG tablet, Take 8 mg by mouth nightly, Disp: , Rfl:     furosemide (LASIX) 20 MG tablet, Take 1 tablet by mouth daily (Patient taking differently: Take 20 mg by mouth daily as needed ), Disp: 60 tablet, Rfl: 3    metFORMIN (GLUCOPHAGE) 500 MG tablet, Take 1 tablet by mouth 2 times daily (with meals) (Patient taking differently: Take 500 mg by mouth 2 times daily (with meals) 500 mg in the AM 1000 mg at bedtime), Disp: 180 tablet, Rfl: 1    Alcohol Swabs (ALCOHOL PREP) 70 % PADS, Us e3 imes per day Diagnosis:  Diabetes Non-Insulin Dependent, Disp: 100 each, Rfl: 11    blood glucose monitor strips, Test 3 times a day & as needed for symptoms of irregular blood glucose. Dispense sufficient amount for indicated testing frequency plus additional to accommodate PRN testing needs. , Disp: 100 strip, Rfl: 0    glucose monitoring kit (FREESTYLE) monitoring kit, 1 kit by Does not apply route daily, Disp: 1 kit, Rfl: 0    pantoprazole (PROTONIX) 40 MG tablet, Take 1 tablet by mouth every morning (before breakfast), Disp: 30 tablet, Rfl: 2    nystatin (MYCOSTATIN) 528244 UNIT/GM powder, Apply topically 2 times daily Apply topically 2 times daily. , Disp: , Rfl:     folic acid (FOLVITE) 1 MG tablet, Take 1 mg by mouth daily, Disp: , Rfl:     vitamin B-12 (CYANOCOBALAMIN) 1000 MCG tablet, Take 1,000 mcg by mouth daily, Disp: , Rfl:     ferrous sulfate (IRON 325) 325 (65 Fe) MG tablet, Take 325 mg by mouth 2 times daily, Disp: , Rfl:     traZODone (DESYREL) 100 MG tablet, Take 100 mg by mouth nightly, Disp: , Rfl:     gabapentin (NEURONTIN) 400 MG capsule, Take 800 mg by mouth 2 times daily.  , Disp: , Rfl:     memantine ER (NAMENDA XR) 28 MG CP24 extended release capsule, Take 28 mg by mouth daily, Disp: , Rfl:     levothyroxine (SYNTHROID) 25 MCG tablet, Take 25 mcg by mouth Daily , Disp: , Rfl:     atorvastatin (LIPITOR) 40 MG tablet, Take 40 mg by mouth daily, Disp: , Rfl:     metoprolol succinate (TOPROL XL) 25 MG extended release tablet, Take 12.5 mg by mouth daily (Patient not taking: Reported on 1/13/2022), Disp: , Rfl:     isosorbide mononitrate (IMDUR) 30 MG extended release tablet, Take 15 mg by mouth daily (Patient not taking: Reported on 1/13/2022), Disp: , Rfl:     Past Medical History  Chikis Huertas  has a past medical history of Agoraphobia, Anemia, Anxiety, Asthma, CAD (coronary artery disease), COPD (chronic obstructive pulmonary disease) ([facility] [address] Dementia ([facility] [address] Depression, Diabetes mellitus ([facility] [address] GERD (gastroesophageal reflux disease), Hypertension, Medtronic dual pacemaker , Neuropathy, PTSD (post-traumatic stress disorder), PVD (peripheral vascular disease) ([address] and Renal disease. Social History  Carlos Denny  reports that she has quit smoking. Her smoking use included cigarettes. She started smoking about 52 years ago. She has a 20.00 pack-year smoking history. She has never used smokeless tobacco. She reports previous alcohol use. She reports previous drug use. Family History  Carlos Denny family history is not on file. There is no family history of bicuspid aortic valve, aneurysms, heart transplant, pacemakers, defibrillators, or sudden cardiac death. Past Surgical History   Past Surgical History:   Procedure Laterality Date    CARDIAC CATHETERIZATION      CHOLECYSTECTOMY  2014    CORONARY ANGIOPLASTY WITH STENT PLACEMENT  2019    x2    ESOPHAGEAL MOTILITY STUDY Left 5/24/2019    ESOPHAGEAL MOTILITY/MANOMETRY STUDY performed by Jazmin Hernandez MD at [facility]    HAND SURGERY Right     HERNIA REPAIR  2016    HYSTERECTOMY, TOTAL ABDOMINAL  1982    OVARY REMOVAL Bilateral 1982    PACEMAKER PLACEMENT      6/15/2021    TONSILLECTOMY AND ADENOIDECTOMY      TRANSESOPHAGEAL ECHOCARDIOGRAM N/A 9/15/2021    TRANSESOPHAGEAL ECHOCARDIOGRAM performed by Shanelle Velasquez MD at [facility] ENDOSCOPY         Review of Systems   Constitutional: Negative for chills and fever  HENT: Negative for congestion, sinus pressure, sneezing and sore throat. Eyes: Negative for pain, discharge, redness and itching. Respiratory: Negative for apnea, cough, (+) sob  Gastrointestinal: Negative for blood in stool, constipation, diarrhea   Endocrine: Negative for cold intolerance, heat intolerance, polydipsia. Genitourinary: Negative for dysuria, enuresis, flank pain and hematuria. Musculoskeletal: Negative for arthralgias, joint swelling and neck pain. Neurological: Negative for numbness and headaches.    Psychiatric/Behavioral: Negative for agitation, confusion, decreased concentration and dysphoric mood. Objective:     /82   Pulse 60   Ht 5' (1.524 m)   Wt 248 lb (112.5 kg)   BMI 48.43 kg/m²     Wt Readings from Last 3 Encounters:   01/13/22 248 lb (112.5 kg)   09/15/21 235 lb 6.4 oz (106.8 kg)   07/20/21 239 lb 4.8 oz (108.5 kg)     BP Readings from Last 3 Encounters:   01/13/22 136/82   09/16/21 (!) 124/57   07/20/21 91/61       Nursing note and vitals reviewed. Physical Exam   Constitutional: Oriented to person, place, and time. Appears well-developed and well-nourished. On supplemental oxygen  HENT:   Head: Normocephalic and atraumatic. Eyes: EOM are normal. Pupils are equal, round, and reactive to light. Neck: Normal range of motion. Neck supple. No JVD present. Cardiovascular: Normal rate, regular rhythm, normal heart sounds and intact distal pulses. No murmur heard. Pulmonary/Chest:  Effort normal and breath sounds normal. No respiratory distress. No wheezes. No rales. Abdominal: Soft. Bowel sounds are normal. No distension. There is no tenderness. Musculoskeletal: Normal range of motion. 3+ bilateral lower extremity edema   Neurological: Alert and oriented to person, place, and time. No cranial nerve deficit. Coordination normal.   Skin: Skin is warm and dry. Lower extremity dry skin with excoriations on bilateral tibial surfaces  Psychiatric: Normal mood and affect.        No results found for: CKTOTAL, CKMB, CKMBINDEX    Lab Results   Component Value Date    WBC 9.9 09/15/2021    RBC 3.01 09/15/2021    RBC 4.52 12/27/2017    HGB 9.6 09/15/2021    HCT 31.3 09/15/2021    .0 09/15/2021    MCH 31.9 09/15/2021    MCHC 30.7 09/15/2021    RDW 13.5 12/27/2017     09/15/2021    MPV 10.0 09/15/2021       Lab Results   Component Value Date     08/06/2021    K 4.2 08/06/2021    K 4.0 06/16/2021     08/06/2021    CO2 28 08/06/2021    BUN 35 08/06/2021    LABALBU 3.9 06/09/2021    CREATININE 1.2 08/06/2021    CALCIUM 9.6 08/06/2021 LABGLOM 45 08/06/2021    LABGLOM 56 06/16/2021    GLUCOSE 151 08/06/2021    GLUCOSE 107 12/27/2017       Lab Results   Component Value Date    ALKPHOS 91 06/09/2021    ALT 18 06/09/2021    AST 21 06/09/2021    PROT 6.7 06/09/2021    BILITOT 1.2 06/09/2021    BILITOT 0.7 12/27/2017    BILIDIR 0.2 12/27/2017    LABALBU 3.9 06/09/2021       Lab Results   Component Value Date    MG 1.8 06/12/2021       Lab Results   Component Value Date    INR 0.83 (L) 09/15/2021    INR 1.02 06/15/2021    INR 1.03 06/11/2021         Lab Results   Component Value Date    LABA1C 6.3 12/27/2017       Lab Results   Component Value Date    TRIG 124 12/27/2017       Lab Results   Component Value Date    TSH 2.200 06/09/2021         Testing Reviewed:      I have individually reviewed the cardiac test below:    ECHO: Results for orders placed during the hospital encounter of 06/09/21    ECHO Complete 2D W Doppler W Color    Narrative  Transthoracic Echocardiography Report (TTE)    Demographics    Patient Name   Hitesh Thao  Gender              Other  K    MR #           [id number]      Race                    Ethnicity    Account #      [de-identified]      Room Number         0019    Accession      [id number]     Date of Study       06/10/2021  Number    Date of Birth  1960     Referring Physician Galdino Ovalle MD    Age            57 year(s)     Dee Sandoval, CS    Interpreting        Marco Antonio Borjas MD  Physician    Procedure    Type of Study    TTE procedure:ECHOCARDIOGRAM COMPLETE 2D W DOPPLER W COLOR. Procedure Date  Date: 06/10/2021 Start: 01:24 PM    Study Location: Bedside  Technical Quality: Adequate visualization    Indications:Elevated troponin. Additional Medical History: Former smoker, anemia, asthma, COPD,  hypertension, diabetes, coronary artery disease, peripheral vascular disease    Patient Status: Routine    Height: 61 inches Weight: 236.01 pounds BSA: 2.03 m^2 BMI: 44.59 kg/m^2    BP: 136/74 mmHg    Conclusions    Summary  Technically difficult examination. Left ventricular size and systolic function is normal. Ejection fraction  was estimated at 55-60%. LV wall thickness is within normal limits. The right ventricular size appears normal with normal systolic function  and wall thickness. Right ventricular systolic pressure of 16-05 mm Hg consistent with mild  pulmonary hypertension. Mild aortic regurgitation is noted. The mitral valve was not well visualized. Possible Flail leaflet. Moderate mitral regurgitation is present. Consider THERON to further evaluate  Mitral valve if clinically indicated. Signature    ----------------------------------------------------------------  Electronically signed by Charlotte Camarillo MD (Interpreting  physician) on 06/10/2021 at 04:34 PM  ----------------------------------------------------------------    Findings    Mitral Valve  The mitral valve was not well visualized. Possible Flail leaflet. Moderate mitral regurgitation is present. Consider THERON to further evaluate  Mitral valve if clinically indicated. Aortic Valve  The aortic valve appears trileaflet with normal thickness and leaflet  excursion. DOPPLER: Transaortic velocity was within the normal range with  no evidence of aortic stenosis. Mild aortic regurgitation is noted. Tricuspid Valve  The tricuspid valve structure is normal with normal leaflet separation. DOPPLER: There is no evidence of tricuspid stenosis. Mild tricuspid regurgitation visualized. Pulmonic Valve  The pulmonic valve was not well visualized . Left Atrium  Moderately dilated left atrium. Left Ventricle  Left ventricular size and systolic function is normal. Ejection fraction  was estimated at 55-60%. LV wall thickness is within normal limits. Right Atrium  Mildly enlarged right atrium size.     Right Ventricle  The right ventricular size appears normal with normal systolic function  and wall thickness. Right ventricular systolic pressure of 08-96 mm Hg consistent with mild  pulmonary hypertension. Pericardial Effusion  The pericardium appears normal with no evidence of a pericardial effusion. Pleural Effusion  No evidence of pleural effusion. Aorta / Great Vessels  -Aortic root dimension within normal limits. -IVC size is within normal limits with normal respiratory phasic changes.     M-Mode/2D Measurements & Calculations    LV Diastolic   LV Systolic Dimension:    AV Cusp Separation: 1.7 cmLA  Dimension: 5.4 3.6 cm                    Dimension: 4.3 cmAO Root  cm             LV Volume Diastolic: 296  Dimension: 3.3 cmLA Area: 18.6  LV FS:33.3 %   ml                        cm^2  LV PW          LV Volume Systolic: 61.3  Diastolic: 1   ml  cm             LV EDV/LV EDV Index: 141  Septum         ml/69 m^2LV ESV/LV ESV    RV Diastolic Dimension: 2.9 cm  Diastolic: 1   Index: 11.2 ml/27 m^2  cm             EF Calculated: 61.4 %     LA/Aorta: 1.3  Ascending Aorta: 3 cm  LA volume/Index: 52.3 ml /26m^2    Doppler Measurements & Calculations    MV Peak E-Wave: 164 cm/s    AV Peak Velocity:  LVOT Peak Velocity: 108  MV Peak A-Wave: 40.2 cm/s   200 cm/s           cm/s  MV E/A Ratio: 4.08          AV Peak Gradient:  LVOT Mean Velocity: 67.9  MV Peak Gradient: 10.76     16 mmHg            cm/s  mmHg                        AV Mean Velocity:  LVOT Peak Gradient: 5  MV Mean Gradient: 3 mmHg    142 cm/s           mmHgLVOT Mean Gradient: 2  MV Mean Velocity: 69.9 cm/s AV Mean Gradient:  mmHg  MV Deceleration Time: 204   9 mmHg  msec                        AV VTI: 38.9 cm  MV P1/2t: 68 msec  MVA by PHT:3.24 cm^2                           TR Velocity:292 cm/s  MV Area (PISA): 0.3 cm^2    LVOT VTI: 20.2 cm  TR Gradient:34.11 mmHg  MV E' Septal Velocity: 5.7  AV P1/2t: 691 msec PV Peak Velocity: 73.7 cm/s  cm/s                        IVRT: 63 msec      PV Peak Gradient: 2.17 mmHg  MV A' Septal Velocity: 4.5  cm/s  MV E' Lateral Velocity: 7.7 AV DVI (VTI):  cm/s                        0. 52AV DVI  MV A' Lateral Velocity: 4.2 (Vmax):0.54  cm/s  E/E' septal: 28.77  E/E' lateral: 21.3  MR Velocity: 532 cm/s  MV JUMA PISA: 0.11 cm^2  MR VTI: 188 cm  Alias Velocity: 38.5  cm/sPISA Radius: 0.5 cm  MV ERO Volumetric: 0.3 cm^2  PISA area: 1.57 cm^2MR flow  rate: 60.44 ml/sMR  volume:20.68 ml    [id number]%[id number]%3d       Assessment/Plan   Chronic congestive diastolic heart failure NYHA II - Evaluated for mitraclip 9/15/2021 but not indicated after completion of THERON demonstrating EF=50-55%. No increased SOB, TSRONG, PND or palpitations. 3+ bilateral lower extremity edema.  - Start Aldactone 25 mg daily  - Resume Lasix 40 mg daily  - Discontinue Toprol XL; bradycardia precludes use of beta blocking agent  - BMP in 1 week and referral to CHF clinic    Group 2 Pulmonary HTN - Secondary to CAD and moderate mitral regurgitation. ECHO with RVSP 35-40mmHg  - Management as above    Obstructive CAD - s/p pacemaker placement 6/15/21 and stent to LAD and circumflex. [facility] 6/2021 with 30% stenosis of proximal LAD and patent stent, luminal irregularities in left main and RCA without obstruction. LCx without significant obstructions. Continue ASA/Statin. COPD - On chronic oxygen therapy at home. No PFT found in chart review for GOLD staging. Follows with outpatient pulmonology. HLD - Noted  - Continue high dose statin    Obesity - BMI=48.43.  - Educated on weight loss, diet, and healthy lifestyle habits    Disposition: follow-up 1 year  The patient is in agreement with and verbalizes understanding of the plan of care today and has no additional questions or complaints.     Electronically signed by Paulie Garcia DO   1/13/2022 at 11:31 AM EST      Attending Supervising Physician's Attestation Statement  I performed a history and physical examination on the patient and discussed the management with the resident physician. I reviewed and agree with the findings and plan as documented in the resident's note except for as noted below. Patient with chronic diastolic failure with secondary PH, off meds due to BP issues, now stabilized, add back diuretic regimen with Lasix/Aldactone, hold BB. The patient was advised on risk/benefits of the new Rx and she agreed to proceed with the medication(s). Check BMP 1-2 weeks, and CHF clinic follow-up. Moderate MR does not meet indication for clip, preserved EF. Wrap legs, elevate, compression stockings. Needs to salt and diet restrict. No BB for now due to borderline bradycardia. The patient was advised on risk/benefits of the new Rx and she agreed to proceed with the medication(s). ASA/statin for CAD. Discussed diet/exercise/BP/weight loss/health lifestyle choices/lipids; the patient understands the goals and will try to comply.       Electronically signed by Callie Lance MD on 1/14/22 at 7:23 AM EST

## 2022-01-24 ENCOUNTER — TELEPHONE (OUTPATIENT)
Dept: CARDIOLOGY CLINIC | Age: 62
End: 2022-01-24

## 2022-01-24 NOTE — TELEPHONE ENCOUNTER
[place] Rodney is calling she needs some advice on her bilateral ankle and leg swelling, cant wear shoes, hurts to bend legs, she's taking Lasix but not helping. Please call her back at [phone].

## 2022-01-25 ENCOUNTER — TELEPHONE (OUTPATIENT)
Dept: CARDIOLOGY CLINIC | Age: 62
End: 2022-01-25

## 2022-01-25 ENCOUNTER — OFFICE VISIT (OUTPATIENT)
Dept: CARDIOLOGY CLINIC | Age: 62
End: 2022-01-25
Payer: MEDICARE

## 2022-01-25 VITALS
DIASTOLIC BLOOD PRESSURE: 78 MMHG | WEIGHT: 249.4 LBS | HEIGHT: 60 IN | HEART RATE: 87 BPM | SYSTOLIC BLOOD PRESSURE: 132 MMHG | OXYGEN SATURATION: 94 % | BODY MASS INDEX: 48.97 KG/M2

## 2022-01-25 DIAGNOSIS — F41.9 ANXIETY: ICD-10-CM

## 2022-01-25 DIAGNOSIS — Z99.81 ON HOME O2: ICD-10-CM

## 2022-01-25 DIAGNOSIS — R60.0 BILATERAL LOWER EXTREMITY EDEMA: ICD-10-CM

## 2022-01-25 DIAGNOSIS — F32.A DEPRESSION, UNSPECIFIED DEPRESSION TYPE: ICD-10-CM

## 2022-01-25 DIAGNOSIS — I50.32 CHRONIC DIASTOLIC CHF (CONGESTIVE HEART FAILURE), NYHA CLASS 2 (HCC): Primary | ICD-10-CM

## 2022-01-25 LAB
ANION GAP SERPL CALCULATED.3IONS-SCNC: 9 MEQ/L (ref 8–16)
BUN BLDV-MCNC: 17 MG/DL (ref 7–22)
CALCIUM SERPL-MCNC: 9.2 MG/DL (ref 8.5–10.5)
CHLORIDE BLD-SCNC: 100 MEQ/L (ref 98–111)
CO2: 30 MEQ/L (ref 23–33)
CREAT SERPL-MCNC: 1.1 MG/DL (ref 0.4–1.2)
ERYTHROCYTE [DISTWIDTH] IN BLOOD BY AUTOMATED COUNT: 13.3 % (ref 11.5–14.5)
ERYTHROCYTE [DISTWIDTH] IN BLOOD BY AUTOMATED COUNT: 48.2 FL (ref 35–45)
GFR SERPL CREATININE-BSD FRML MDRD: 50 ML/MIN/1.73M2
GLUCOSE BLD-MCNC: 127 MG/DL (ref 70–108)
HCT VFR BLD CALC: 33.6 % (ref 37–47)
HEMOGLOBIN: 10.4 GM/DL (ref 12–16)
MCH RBC QN AUTO: 30.6 PG (ref 26–33)
MCHC RBC AUTO-ENTMCNC: 31 GM/DL (ref 32.2–35.5)
MCV RBC AUTO: 98.8 FL (ref 81–99)
PLATELET # BLD: 189 THOU/MM3 (ref 130–400)
PMV BLD AUTO: 9.7 FL (ref 9.4–12.4)
POTASSIUM SERPL-SCNC: 4.3 MEQ/L (ref 3.5–5.2)
PRO-BNP: 241 PG/ML (ref 0–900)
RBC # BLD: 3.4 MILL/MM3 (ref 4.2–5.4)
SODIUM BLD-SCNC: 139 MEQ/L (ref 135–145)
WBC # BLD: 4.9 THOU/MM3 (ref 4.8–10.8)

## 2022-01-25 PROCEDURE — 1036F TOBACCO NON-USER: CPT | Performed by: NURSE PRACTITIONER

## 2022-01-25 PROCEDURE — G8482 FLU IMMUNIZE ORDER/ADMIN: HCPCS | Performed by: NURSE PRACTITIONER

## 2022-01-25 PROCEDURE — G8417 CALC BMI ABV UP PARAM F/U: HCPCS | Performed by: NURSE PRACTITIONER

## 2022-01-25 PROCEDURE — 36415 COLL VENOUS BLD VENIPUNCTURE: CPT | Performed by: NURSE PRACTITIONER

## 2022-01-25 PROCEDURE — 99214 OFFICE O/P EST MOD 30 MIN: CPT | Performed by: NURSE PRACTITIONER

## 2022-01-25 PROCEDURE — G8427 DOCREV CUR MEDS BY ELIG CLIN: HCPCS | Performed by: NURSE PRACTITIONER

## 2022-01-25 PROCEDURE — 3017F COLORECTAL CA SCREEN DOC REV: CPT | Performed by: NURSE PRACTITIONER

## 2022-01-25 PROCEDURE — 96374 THER/PROPH/DIAG INJ IV PUSH: CPT | Performed by: NURSE PRACTITIONER

## 2022-01-25 RX ORDER — METOLAZONE 2.5 MG/1
2.5 TABLET ORAL DAILY PRN
Qty: 10 TABLET | Refills: 0 | Status: SHIPPED | OUTPATIENT
Start: 2022-01-25

## 2022-01-25 RX ORDER — FLUOXETINE HYDROCHLORIDE 20 MG/1
50 CAPSULE ORAL DAILY
COMMUNITY
Start: 2021-12-10

## 2022-01-25 RX ORDER — TORSEMIDE 20 MG/1
20 TABLET ORAL DAILY
Qty: 30 TABLET | Refills: 3 | Status: SHIPPED | OUTPATIENT
Start: 2022-01-25 | End: 2022-04-07 | Stop reason: SDUPTHER

## 2022-01-25 RX ORDER — FUROSEMIDE 10 MG/ML
40 INJECTION INTRAMUSCULAR; INTRAVENOUS ONCE
Status: COMPLETED | OUTPATIENT
Start: 2022-01-25 | End: 2022-01-25

## 2022-01-25 RX ORDER — POTASSIUM CHLORIDE 20 MEQ/1
20 TABLET, EXTENDED RELEASE ORAL ONCE
Status: COMPLETED | OUTPATIENT
Start: 2022-01-25 | End: 2022-01-25

## 2022-01-25 RX ADMIN — FUROSEMIDE 40 MG: 10 INJECTION INTRAMUSCULAR; INTRAVENOUS at 15:15

## 2022-01-25 RX ADMIN — POTASSIUM CHLORIDE 20 MEQ: 20 TABLET, EXTENDED RELEASE ORAL at 15:20

## 2022-01-25 NOTE — PROGRESS NOTES
Heart Failure Clinic       Visit Date: 1/25/2022  Cardiologist:  Dr. Page Biswas  Primary Care Physician: Dr. Anitha Hernandez MD    Merary Zapata is a 57 y.o. female who presents today for:  Chief Complaint   Patient presents with    Congestive Heart Failure       HPI:   Merary Zapata is a 57 y.o. female who presents to the office for a follow up patient visit in the heart failure clinic. Accompanied by no one    TYPE HF: HFpEF (EF 50-55%), Mod MR (prior MitraClip workup no longer indicated)  Device: Pacer [facility] 6/15/2021)  HX: HTN, CAD s/p PCI [facility], [address] [place] [address]), DM, Asthma/COPD, Home O2 2L (since 2015), 3L activity (Dr Gomez Villarreal) former smoker (hx 20pk/yr), CKD stg 3, Dementia, Depression, Neuropathy/dizziness -Dr Alayna Barajas     Hospitalization:  June 2021 - fatigue. Anemia - Hgb 9.8 (received 2U RBC at ). Bradycardia - Pacemaker placed  [facility] - patent coronaries - PCW 32, LVEDP 30    OV [place] 1/13 - had not been taking several meds - restarted on Lasix/Aldactone (had not taken since Aug). Didn't start meds till 5 days ago (had to be mailed)  Wt 248  Pt called yesterday c/o increased swelling. Today: wt today 249# - up 1#    No labs since 8/2021  Tearful - states needs admitted for fluid overload. Had call maintanence man to help get shoes on today. \"cant bend my knee\".    Worse since seeing Page Biswas, states Lasix not helping, but worsening over past 3 months  Activity: walked from entrance -   Diet: poor    Patient has:  Chest Pain: no  SOB: no  Orthopnea/PND: no  KELVIN: no  Edema: yes   Fatigue: no  Abdominal bloating: no  Cough: no  Appetite: good  Home weight: not checking   Home blood pressure: not checkin    Past Medical History:   Diagnosis Date    Agoraphobia     Anemia     Anxiety     Asthma     CAD (coronary artery disease)     COPD (chronic obstructive pulmonary disease) (HCC)     Dementia (HCC)     Depression     Diabetes mellitus (HCC)     GERD (gastroesophageal reflux disease)     Hypertension     Medtronic dual pacemaker  1/13/2022    Neuropathy     PTSD (post-traumatic stress disorder)     PVD (peripheral vascular disease) ([facility] [address]     Renal disease     Stage 3     Past Surgical History:   Procedure Laterality Date    CARDIAC CATHETERIZATION      CHOLECYSTECTOMY  2014    CORONARY ANGIOPLASTY WITH STENT PLACEMENT  2019    x2    ESOPHAGEAL MOTILITY STUDY Left 5/24/2019    ESOPHAGEAL MOTILITY/MANOMETRY STUDY performed by Christina Hughes MD at [facility]    HAND SURGERY Right    [address]  2016    HYSTERECTOMY, TOTAL ABDOMINAL  1982    OVARY REMOVAL Bilateral 1982    PACEMAKER PLACEMENT      6/15/2021    TONSILLECTOMY AND ADENOIDECTOMY      TRANSESOPHAGEAL ECHOCARDIOGRAM N/A 9/15/2021    TRANSESOPHAGEAL ECHOCARDIOGRAM performed by Charles Rock MD at [facility]       No family history on file.   Social History     Tobacco Use    Smoking status: Former Smoker     Packs/day: 0.50     Years: 40.00     Pack years: 20.00     Types: Cigarettes     Start date: 1/1/1970    Smokeless tobacco: Never Used   Substance Use Topics    Alcohol use: Not Currently     Current Outpatient Medications   Medication Sig Dispense Refill    FLUoxetine (PROZAC) 20 MG capsule Take 20 mg by mouth daily       torsemide (DEMADEX) 20 MG tablet Take 1 tablet by mouth daily 30 tablet 3    metOLazone (ZAROXOLYN) 2.5 MG tablet Take 1 tablet by mouth daily as needed (as directed by CHF clinic) 10 tablet 0    ramelteon (ROZEREM) 8 MG tablet Take 8 mg by mouth nightly      furosemide (LASIX) 40 MG tablet Take 1 tablet by mouth daily 90 tablet 1    spironolactone (ALDACTONE) 25 MG tablet Take 1 tablet by mouth daily 90 tablet 3    metFORMIN (GLUCOPHAGE) 500 MG tablet Take 1 tablet by mouth 2 times daily (with meals) (Patient taking differently: Take 500 mg by mouth 2 times daily (with meals) 500 mg in the AM  1000 mg at bedtime) 180 tablet 1    Alcohol Swabs (ALCOHOL PREP) 70 % PADS Us e3 imes per day Diagnosis:  Diabetes Non-Insulin Dependent 100 each 11    blood glucose monitor strips Test 3 times a day & as needed for symptoms of irregular blood glucose. Dispense sufficient amount for indicated testing frequency plus additional to accommodate PRN testing needs. 100 strip 0    glucose monitoring kit (FREESTYLE) monitoring kit 1 kit by Does not apply route daily 1 kit 0    pantoprazole (PROTONIX) 40 MG tablet Take 1 tablet by mouth every morning (before breakfast) 30 tablet 2    nystatin (MYCOSTATIN) 986727 UNIT/GM powder Apply topically 2 times daily Apply topically 2 times daily.  folic acid (FOLVITE) 1 MG tablet Take 1 mg by mouth daily      ferrous sulfate (IRON 325) 325 (65 Fe) MG tablet Take 325 mg by mouth 2 times daily      traZODone (DESYREL) 100 MG tablet Take 100 mg by mouth nightly      gabapentin (NEURONTIN) 400 MG capsule Take 800 mg by mouth 2 times daily.  memantine ER (NAMENDA XR) 28 MG CP24 extended release capsule Take 28 mg by mouth daily      levothyroxine (SYNTHROID) 25 MCG tablet Take 25 mcg by mouth Daily       atorvastatin (LIPITOR) 40 MG tablet Take 40 mg by mouth daily       No current facility-administered medications for this visit.      Allergies   Allergen Reactions    Adhesive Tape      Causes skin tears    Baclofen Other (See Comments)     All muscle relaxers, Lethargy    Benadryl [Diphenhydramine]      rash    Donepezil     Flexeril [Cyclobenzaprine]      shakes    Norco [Hydrocodone-Acetaminophen] Other (See Comments)     Muscle weakness    Percocet [Oxycodone-Acetaminophen]     Tizanidine        SUBJECTIVE:   Review of Systems    OBJECTIVE:   Today's Vitals:  /78   Pulse 87   Ht 5' (1.524 m)   Wt 249 lb 6.4 oz (113.1 kg)   SpO2 94%   BMI 48.71 kg/m²     Physical Exam      Wt Readings from Last 3 Encounters:   01/25/22 249 lb 6.4 oz (113.1 kg)   01/13/22 248 lb (112.5 kg)   09/15/21 235 lb 6.4 oz (106.8 kg)     BP Readings from Last 3 Encounters:   01/25/22 132/78   01/13/22 136/82   09/16/21 (!) 124/57     Pulse Readings from Last 3 Encounters:   01/25/22 87   01/13/22 60   09/16/21 69     Body mass index is 48.71 kg/m². THERON   Ejection fraction was estimated at 50-55%. Left atrial size was severely dilated with no thrombus identified. The mitral valve structure was thickened with reduced coaptation and   normal leaflet separation. DOPPLER: The transmitral velocity was within   the normal range with no evidence for mitral stenosis. There was moderate   mitral regurgitation. Mild tricuspid regurgitation. Signature      ----------------------------------------------------------------   Electronically signed by Arlette Mahajan MD (Interpreting   physician) on 09/15/2021 at 04:58 PM   ----------------------------------------------------------------      Echo: 6/11/21  Summary   Probe easily inserted by Cardiologist.  Nelly Zuniga performed without complications.   The study included complete 2D imaging, complete spectral doppler, and   color doppler.   The transesophageal approach was used.   Risk benefits and alternatives were explained to the patient and informed   consent was obtained.   Ejection fraction was estimated at 50-55%.   Left atrial size was severely dilated with no thrombus identified.   APPENDAGE: The left atrial appendage size was normal with no thrombus   identified. DOPPLER: The function was normal (normal emptying velocity).  ATRIAL SEPTUM: Small PFO identified by color and bubble study.   The mitral valve structure was degenerated with restriction of the   posterior leaflet. DOPPLER: The transmitral velocity was within the normal   range with no evidence for mitral stenosis. There was severe mitral   regurgitation (mean gradient 2 mmHg).    Mild tricuspid regurgitation.      Signature      ----------------------------------------------------------------   Electronically signed by Janae Spivey MD        CATH/STRESS:   Left Heart Cath: 6/13/21  RIGHT HEART CATHETERIZATION:  RA 26, RV 62/22, PA 67/30 with a mean of  41, wedge pressure 32 along with PA saturation of 64%.     CORONARY ANGIOGRAM:  LEFT MAIN:  Mild luminal irregularities but patent without any  significant obstruction.     LAD:  30% stenosis proximally and with a patent stent to the proximal  segment, distally has luminal irregularities with no significant  obstruction.  Small diagonal branch without any significant obstruction.     LCX:  Patent in the proximal segment.  No significant obstruction. Bifurcates into a small AV groove branch and a larger OM1 branch without  any significant obstruction.     RCA:  Luminal irregularities throughout without any significant  obstruction.  Bifurcates into a very small PLB and larger PDA.  RCA is  dominant.     LV:  LV systolic pressure is 453.  No significant LV to AO systolic  gradient.  LVEDP is 30.  LVEF is 45 to 50%.  Mild to moderate dilation. Aortic valve is tricuspid.   No significant aneurysm or dissection of  the visualized portion of the aorta.  There is 4+ mitral regurgitation  with reflux into the pulmonary veins.     SUMMARY:  Severe mitral regurgitation; no significant coronary artery  disease; elevated right heart cath pressures with preserved cardiac  output.     PLAN:  1.  Bedrest.  2.  Optimal medical therapy. 3.  Risk factor management. 4.  Routine access site care. 5.  Guideline-directed therapy for acute heart failure. 6.  Uptitrate diuretics. 8.  Holding mitral valve workup as outpatient. 9.  CT Surgery consultation. 10.  Follow up with myself in two to four weeks postprocedure.     All the above was explained to the patient and the patient's family.    They are agreeable and amenable to the above plan.        Harry Kam MD       Results reviewed:  BNP:   Lab Results   Component Value Date     (H) 08/06/2021     CBC:   Lab Results Component Value Date    WBC 4.9 01/25/2022    RBC 3.40 01/25/2022    RBC 4.52 12/27/2017    HGB 10.4 01/25/2022    HCT 33.6 01/25/2022     01/25/2022     CMP:    Lab Results   Component Value Date     01/25/2022    K 4.3 01/25/2022    K 4.0 06/16/2021     01/25/2022    CO2 30 01/25/2022    BUN 17 01/25/2022    CREATININE 1.1 01/25/2022    AGRATIO 1.1 12/27/2017    LABGLOM 50 01/25/2022    GLUCOSE 127 01/25/2022    GLUCOSE 107 12/27/2017    CALCIUM 9.2 01/25/2022     Hepatic Function Panel:    Lab Results   Component Value Date    ALKPHOS 91 06/09/2021    ALT 18 06/09/2021    AST 21 06/09/2021    PROT 6.7 06/09/2021    BILITOT 1.2 06/09/2021    BILITOT 0.7 12/27/2017    BILIDIR 0.2 12/27/2017    LABALBU 3.9 06/09/2021     Magnesium:    Lab Results   Component Value Date    MG 1.8 06/12/2021     PT/INR:    Lab Results   Component Value Date    INR 0.83 09/15/2021     Lipids:    Lab Results   Component Value Date    TRIG 124 12/27/2017       ASSESSMENT AND PLAN:   The patient's condition/symptoms are Stable: No clinical evidence of fluid overload today. Continue current medical regimen without changes at present time. Diagnosis Orders   1. Chronic diastolic CHF (congestive heart failure), NYHA class 2 ([facility])  Basic Metabolic Panel    Brain Natriuretic Peptide    CBC   2. Bilateral lower extremity edema     3. Anxiety  [facility]   4. Depression, unspecified depression type  [facility]   5. On home O2       Continue:  GDMT:              ACE/ARB/ARNI - None              BB - none d/t borderline bradycardia              Diuretic - Lasix 40/day - change to Torsemide 20/day  AA - Aldactone 25/day  Vasodilator - none  Continue Current medications:  Fe sulfate  Stable but hypervolemic - mainly LE edema - no SOB, orthopnea.     Repeat blood work today - BMP, CBC, BNP  Lasix 40 mg IV today  Kcl 20 po  Consider changing to Torsemide and adding Metolazone = pending bloodwork   BP/HR stable   Nurse to apply Tubigrips - pt to look into getting some. (not tolerate compression socks)  If not improvement - recommend Lymphedema clinic  Better diet/fluid adherence  Referral to Psychiatry - pt dealing w/ a lot emotional issues. Begging to go back on Xanax. Doubled her Prozac on her own. Tearful t/o whole OV today. Does not want to see psychologist/therapist.  F/U w/ Cardiology  F/U in clinic in 1 week. ADDENDUM  Labs reviewed - stable  Ok to change to try Torsemide - STOP Lasix  Take Metolazone 2.5 mg 1 tab x 2 days (1 hr before Torsemide)    Tolerating above noted HF meds, no ill side effects noted. Will continue to monitor kidney function and electrolytes. Will optimize as tolerated. Pt is compliant w/ medications. Total visit time of 30 minutes has been spent with patient on education of symptoms, management, medication, and plan of care; as well as review of chart: labs, ECHO, radiology reports, etc.   I personally spent more then 50% of the appt time face to face with the patient. · Daily weights  · Fluid restriction of 2 Liters per day  · Limit sodium in diet to around 2832-0027 mg/day  · Monitor BP  · Activity as tolerated     Patient was instructed to call the [address] for any changes in symptoms as noted in AVS.      Return in about 2 weeks (around 2/8/2022). or sooner if needed     Patient given educational materials - see patient instructions. We discussed the importance of weighing oneself and recording daily. We also discussed the importance of a low sodium diet, higher sodium foods to avoid and better low sodium food options. Patient verbalizes understanding of plan of care using teach back method, and is agreeable to the treatment plan.        Electronically signed by SHERMAN Parikh CNP on 1/25/2022 at 4:11 PM

## 2022-01-25 NOTE — TELEPHONE ENCOUNTER
Labs reviewed - stable  Ok to change to try Torsemide - STOP Lasix  Take Metolazone 2.5 mg 1 tab x 2 days (1 hr before Torsemide)  Order Tubigrips!!

## 2022-01-25 NOTE — PROGRESS NOTES
Venipuncture obtained from left AC. Blood work obtained. IVP Lasix 40mg and PO Potassium 20mEq given per order. Patient tolerated procedure without complications or complaints.

## 2022-01-26 NOTE — TELEPHONE ENCOUNTER
Feels 110% better, urinated a lot yesterday. Was upset that she received IV and had to wait on a ride and lived so far away, but does feel better  Weight yesterday- 251 lb. Weight today - 246 lb.

## 2022-01-31 NOTE — TELEPHONE ENCOUNTER
Pt called in bp was 99-54, pulse 72 weight 248 lbs, pt in alot of pain, legs hurt, she states she has voided in her pants. Pt wants to be admitted as she needs observations. Per Dr. Serrano Laughter, pt needs to go to ER if in that much pain.    Pt also states to cancel heart failure clinic as she will probably be admitted in the hospital.

## 2022-01-31 NOTE — TELEPHONE ENCOUNTER
Patient contacted office very tearful  She does not want to come to appointment in CHF clinic tomorrow  She wants something done about her swelling in her legs but does not want IV lasix in the office and travel all the way home  She stated she has looked up on line and wants a PICC line and an order for lasix. Discussed with patient risks and reasons for PICC line. Not for use for intermittent OP lasix. She stated she wants something done but will not get IV lasix and then go home. She states she wants to be admitted and 20 lbs taken off  Advised patient that if she feels she is worse she should go to emergency room but can not guarantee she will be admitted that is up to the emergency room doctor. Patient stated she will call Dr. Susana Oswald and see what he will do for her and if he can't do anything she will just go to [facility]. [facility]   Informed patient we are trying to help patient as best we can. Recommendations?

## 2022-02-01 ENCOUNTER — HOSPITAL ENCOUNTER (OUTPATIENT)
Age: 62
Setting detail: OBSERVATION
Discharge: HOME | End: 2022-02-05
Attending: HOSPITALIST | Admitting: HOSPITALIST
Payer: MEDICARE

## 2022-02-01 ENCOUNTER — APPOINTMENT (OUTPATIENT)
Dept: GENERAL RADIOLOGY | Age: 62
End: 2022-02-01
Payer: MEDICARE

## 2022-02-01 DIAGNOSIS — R60.0 BILATERAL LEG EDEMA: ICD-10-CM

## 2022-02-01 DIAGNOSIS — F41.1 ANXIETY STATE: ICD-10-CM

## 2022-02-01 DIAGNOSIS — E87.70 HYPERVOLEMIA, UNSPECIFIED HYPERVOLEMIA TYPE: ICD-10-CM

## 2022-02-01 DIAGNOSIS — N17.9 AKI (ACUTE KIDNEY INJURY) (HCC): ICD-10-CM

## 2022-02-01 DIAGNOSIS — I50.9 CONGESTIVE HEART FAILURE, UNSPECIFIED HF CHRONICITY, UNSPECIFIED HEART FAILURE TYPE (HCC): Primary | ICD-10-CM

## 2022-02-01 LAB
ALBUMIN SERPL-MCNC: 4.1 G/DL (ref 3.5–5.1)
ALP BLD-CCNC: 84 U/L (ref 38–126)
ALT SERPL-CCNC: 13 U/L (ref 11–66)
ANION GAP SERPL CALCULATED.3IONS-SCNC: 14 MEQ/L (ref 8–16)
AST SERPL-CCNC: 18 U/L (ref 5–40)
BASOPHILS # BLD: 0.7 %
BASOPHILS ABSOLUTE: 0 THOU/MM3 (ref 0–0.1)
BILIRUB SERPL-MCNC: 0.3 MG/DL (ref 0.3–1.2)
BILIRUBIN DIRECT: < 0.2 MG/DL (ref 0–0.3)
BILIRUBIN URINE: NEGATIVE
BLOOD, URINE: NEGATIVE
BUN BLDV-MCNC: 21 MG/DL (ref 7–22)
CALCIUM SERPL-MCNC: 9.3 MG/DL (ref 8.5–10.5)
CHARACTER, URINE: CLEAR
CHLORIDE BLD-SCNC: 99 MEQ/L (ref 98–111)
CO2: 27 MEQ/L (ref 23–33)
COLOR: YELLOW
CREAT SERPL-MCNC: 1.2 MG/DL (ref 0.4–1.2)
EOSINOPHIL # BLD: 2.6 %
EOSINOPHILS ABSOLUTE: 0.1 THOU/MM3 (ref 0–0.4)
ERYTHROCYTE [DISTWIDTH] IN BLOOD BY AUTOMATED COUNT: 13.2 % (ref 11.5–14.5)
ERYTHROCYTE [DISTWIDTH] IN BLOOD BY AUTOMATED COUNT: 47.6 FL (ref 35–45)
GFR SERPL CREATININE-BSD FRML MDRD: 45 ML/MIN/1.73M2
GLUCOSE BLD-MCNC: 143 MG/DL (ref 70–108)
GLUCOSE URINE: NEGATIVE MG/DL
HCT VFR BLD CALC: 33.3 % (ref 37–47)
HEMOGLOBIN: 10.4 GM/DL (ref 12–16)
IMMATURE GRANS (ABS): 0.02 THOU/MM3 (ref 0–0.07)
IMMATURE GRANULOCYTES: 0.4 %
KETONES, URINE: NEGATIVE
LEUKOCYTE ESTERASE, URINE: NEGATIVE
LYMPHOCYTES # BLD: 34.9 %
LYMPHOCYTES ABSOLUTE: 1.9 THOU/MM3 (ref 1–4.8)
MCH RBC QN AUTO: 30.8 PG (ref 26–33)
MCHC RBC AUTO-ENTMCNC: 31.2 GM/DL (ref 32.2–35.5)
MCV RBC AUTO: 98.5 FL (ref 81–99)
MONOCYTES # BLD: 11.7 %
MONOCYTES ABSOLUTE: 0.6 THOU/MM3 (ref 0.4–1.3)
NITRITE, URINE: NEGATIVE
NUCLEATED RED BLOOD CELLS: 0 /100 WBC
OSMOLALITY CALCULATION: 284.8 MOSMOL/KG (ref 275–300)
PH UA: 7.5 (ref 5–9)
PLATELET # BLD: 271 THOU/MM3 (ref 130–400)
PMV BLD AUTO: 9.4 FL (ref 9.4–12.4)
POTASSIUM REFLEX MAGNESIUM: 4.4 MEQ/L (ref 3.5–5.2)
PRO-BNP: 355.2 PG/ML (ref 0–900)
PROTEIN UA: NEGATIVE
RBC # BLD: 3.38 MILL/MM3 (ref 4.2–5.4)
SEG NEUTROPHILS: 49.7 %
SEGMENTED NEUTROPHILS ABSOLUTE COUNT: 2.7 THOU/MM3 (ref 1.8–7.7)
SODIUM BLD-SCNC: 140 MEQ/L (ref 135–145)
SPECIFIC GRAVITY, URINE: 1.01 (ref 1–1.03)
TOTAL PROTEIN: 7.1 G/DL (ref 6.1–8)
TROPONIN T: < 0.01 NG/ML
UROBILINOGEN, URINE: 0.2 EU/DL (ref 0–1)
WBC # BLD: 5.4 THOU/MM3 (ref 4.8–10.8)

## 2022-02-01 PROCEDURE — 80048 BASIC METABOLIC PNL TOTAL CA: CPT

## 2022-02-01 PROCEDURE — 93005 ELECTROCARDIOGRAM TRACING: CPT | Performed by: HOSPITALIST

## 2022-02-01 PROCEDURE — 71046 X-RAY EXAM CHEST 2 VIEWS: CPT

## 2022-02-01 PROCEDURE — 80076 HEPATIC FUNCTION PANEL: CPT

## 2022-02-01 PROCEDURE — 85025 COMPLETE CBC W/AUTO DIFF WBC: CPT

## 2022-02-01 PROCEDURE — 99220 PR INITIAL OBSERVATION CARE/DAY 70 MINUTES: CPT | Performed by: HOSPITALIST

## 2022-02-01 PROCEDURE — 6360000002 HC RX W HCPCS: Performed by: PHYSICIAN ASSISTANT

## 2022-02-01 PROCEDURE — 83880 ASSAY OF NATRIURETIC PEPTIDE: CPT

## 2022-02-01 PROCEDURE — 99285 EMERGENCY DEPT VISIT HI MDM: CPT

## 2022-02-01 PROCEDURE — 96374 THER/PROPH/DIAG INJ IV PUSH: CPT

## 2022-02-01 PROCEDURE — 81003 URINALYSIS AUTO W/O SCOPE: CPT

## 2022-02-01 PROCEDURE — G0378 HOSPITAL OBSERVATION PER HR: HCPCS

## 2022-02-01 PROCEDURE — 84484 ASSAY OF TROPONIN QUANT: CPT

## 2022-02-01 RX ORDER — FUROSEMIDE 10 MG/ML
40 INJECTION INTRAMUSCULAR; INTRAVENOUS 2 TIMES DAILY
Status: DISCONTINUED | OUTPATIENT
Start: 2022-02-02 | End: 2022-02-04

## 2022-02-01 RX ORDER — FLUOXETINE HYDROCHLORIDE 20 MG/1
20 CAPSULE ORAL DAILY
Status: DISCONTINUED | OUTPATIENT
Start: 2022-02-02 | End: 2022-02-06 | Stop reason: HOSPADM

## 2022-02-01 RX ORDER — FOLIC ACID 1 MG/1
1 TABLET ORAL DAILY
Status: DISCONTINUED | OUTPATIENT
Start: 2022-02-02 | End: 2022-02-06 | Stop reason: HOSPADM

## 2022-02-01 RX ORDER — GABAPENTIN 400 MG/1
800 CAPSULE ORAL 2 TIMES DAILY
Status: DISCONTINUED | OUTPATIENT
Start: 2022-02-01 | End: 2022-02-06 | Stop reason: HOSPADM

## 2022-02-01 RX ORDER — SODIUM CHLORIDE 9 MG/ML
25 INJECTION, SOLUTION INTRAVENOUS PRN
Status: DISCONTINUED | OUTPATIENT
Start: 2022-02-01 | End: 2022-02-06 | Stop reason: HOSPADM

## 2022-02-01 RX ORDER — PANTOPRAZOLE SODIUM 40 MG/1
40 TABLET, DELAYED RELEASE ORAL
Status: DISCONTINUED | OUTPATIENT
Start: 2022-02-02 | End: 2022-02-06 | Stop reason: HOSPADM

## 2022-02-01 RX ORDER — MEMANTINE HYDROCHLORIDE 10 MG/1
10 TABLET ORAL 2 TIMES DAILY
Status: DISCONTINUED | OUTPATIENT
Start: 2022-02-01 | End: 2022-02-06 | Stop reason: HOSPADM

## 2022-02-01 RX ORDER — LANOLIN ALCOHOL/MO/W.PET/CERES
5 CREAM (GRAM) TOPICAL NIGHTLY PRN
Status: DISCONTINUED | OUTPATIENT
Start: 2022-02-02 | End: 2022-02-06 | Stop reason: HOSPADM

## 2022-02-01 RX ORDER — ACETAMINOPHEN 650 MG/1
650 SUPPOSITORY RECTAL EVERY 6 HOURS PRN
Status: DISCONTINUED | OUTPATIENT
Start: 2022-02-01 | End: 2022-02-06 | Stop reason: HOSPADM

## 2022-02-01 RX ORDER — LEVOTHYROXINE SODIUM 0.03 MG/1
25 TABLET ORAL DAILY
Status: DISCONTINUED | OUTPATIENT
Start: 2022-02-02 | End: 2022-02-06 | Stop reason: HOSPADM

## 2022-02-01 RX ORDER — FUROSEMIDE 10 MG/ML
40 INJECTION INTRAMUSCULAR; INTRAVENOUS ONCE
Status: COMPLETED | OUTPATIENT
Start: 2022-02-01 | End: 2022-02-01

## 2022-02-01 RX ORDER — SODIUM CHLORIDE 0.9 % (FLUSH) 0.9 %
10 SYRINGE (ML) INJECTION EVERY 12 HOURS SCHEDULED
Status: DISCONTINUED | OUTPATIENT
Start: 2022-02-01 | End: 2022-02-06 | Stop reason: HOSPADM

## 2022-02-01 RX ORDER — SPIRONOLACTONE 25 MG/1
25 TABLET ORAL DAILY
Status: DISCONTINUED | OUTPATIENT
Start: 2022-02-02 | End: 2022-02-06 | Stop reason: HOSPADM

## 2022-02-01 RX ORDER — ACETAMINOPHEN 325 MG/1
650 TABLET ORAL EVERY 6 HOURS PRN
Status: DISCONTINUED | OUTPATIENT
Start: 2022-02-01 | End: 2022-02-06 | Stop reason: HOSPADM

## 2022-02-01 RX ORDER — FERROUS SULFATE 325(65) MG
325 TABLET ORAL 2 TIMES DAILY
Status: DISCONTINUED | OUTPATIENT
Start: 2022-02-01 | End: 2022-02-06 | Stop reason: HOSPADM

## 2022-02-01 RX ORDER — SODIUM CHLORIDE 0.9 % (FLUSH) 0.9 %
10 SYRINGE (ML) INJECTION PRN
Status: DISCONTINUED | OUTPATIENT
Start: 2022-02-01 | End: 2022-02-06 | Stop reason: HOSPADM

## 2022-02-01 RX ORDER — POLYETHYLENE GLYCOL 3350 17 G/17G
17 POWDER, FOR SOLUTION ORAL DAILY PRN
Status: DISCONTINUED | OUTPATIENT
Start: 2022-02-01 | End: 2022-02-06 | Stop reason: HOSPADM

## 2022-02-01 RX ORDER — ONDANSETRON 2 MG/ML
4 INJECTION INTRAMUSCULAR; INTRAVENOUS EVERY 6 HOURS PRN
Status: DISCONTINUED | OUTPATIENT
Start: 2022-02-01 | End: 2022-02-06 | Stop reason: HOSPADM

## 2022-02-01 RX ORDER — TRAZODONE HYDROCHLORIDE 100 MG/1
100 TABLET ORAL NIGHTLY
Status: DISCONTINUED | OUTPATIENT
Start: 2022-02-01 | End: 2022-02-06 | Stop reason: HOSPADM

## 2022-02-01 RX ORDER — ATORVASTATIN CALCIUM 40 MG/1
40 TABLET, FILM COATED ORAL DAILY
Status: DISCONTINUED | OUTPATIENT
Start: 2022-02-02 | End: 2022-02-06 | Stop reason: HOSPADM

## 2022-02-01 RX ADMIN — FUROSEMIDE 40 MG: 10 INJECTION, SOLUTION INTRAMUSCULAR; INTRAVENOUS at 21:05

## 2022-02-01 ASSESSMENT — ENCOUNTER SYMPTOMS
SHORTNESS OF BREATH: 0
SORE THROAT: 0
EYE DISCHARGE: 0
RHINORRHEA: 0
DIARRHEA: 0
ABDOMINAL PAIN: 0
NAUSEA: 0
EYE ITCHING: 0
VOMITING: 0
COUGH: 0
SINUS PRESSURE: 0

## 2022-02-01 ASSESSMENT — PAIN DESCRIPTION - LOCATION: LOCATION: FOOT;LEG

## 2022-02-01 ASSESSMENT — PAIN DESCRIPTION - FREQUENCY: FREQUENCY: CONTINUOUS

## 2022-02-01 ASSESSMENT — PAIN DESCRIPTION - ONSET: ONSET: ON-GOING

## 2022-02-01 ASSESSMENT — PAIN SCALES - GENERAL: PAINLEVEL_OUTOF10: 6

## 2022-02-01 ASSESSMENT — PAIN DESCRIPTION - DESCRIPTORS: DESCRIPTORS: DULL;STABBING

## 2022-02-01 NOTE — ED TRIAGE NOTES
Pt presents to the ED via [facility] for bilateral leg and feet swelling. Pt has a prior history of swelling in her legs. Pt states that she uses a walker to ambulate but is currently having difficulty with ambulation. Pt states that her diuretics have been changed recently by Dr. Jeny Hernandez and the HF clinic. Edema is nonpitting, pt states the swelling continues up into her stomach. Pt reports pain in her lower legs and feet. Pt states that she has been feeling SOB over the past two days. Pt does not appear to be in any acute respiratory distress. Pt is currently on [facility], which is what she wears at home continuously. VSS and EKG completed.

## 2022-02-01 NOTE — ED NOTES
Bed: 007A  Expected date: 2/1/22  Expected time: 5:24 PM  Means of arrival:   Comments:  Melyssa Clark RN  02/01/22 0066

## 2022-02-02 LAB
ALBUMIN SERPL-MCNC: 3.6 G/DL (ref 3.5–5.1)
ALP BLD-CCNC: 78 U/L (ref 38–126)
ALT SERPL-CCNC: 12 U/L (ref 11–66)
ANION GAP SERPL CALCULATED.3IONS-SCNC: 13 MEQ/L (ref 8–16)
AST SERPL-CCNC: 14 U/L (ref 5–40)
BILIRUB SERPL-MCNC: 0.3 MG/DL (ref 0.3–1.2)
BUN BLDV-MCNC: 21 MG/DL (ref 7–22)
CALCIUM SERPL-MCNC: 9.3 MG/DL (ref 8.5–10.5)
CHLORIDE BLD-SCNC: 101 MEQ/L (ref 98–111)
CO2: 28 MEQ/L (ref 23–33)
CREAT SERPL-MCNC: 1.1 MG/DL (ref 0.4–1.2)
EKG ATRIAL RATE: 79 BPM
EKG P AXIS: 79 DEGREES
EKG P-R INTERVAL: 160 MS
EKG Q-T INTERVAL: 394 MS
EKG QRS DURATION: 94 MS
EKG QTC CALCULATION (BAZETT): 451 MS
EKG R AXIS: 57 DEGREES
EKG T AXIS: 67 DEGREES
EKG VENTRICULAR RATE: 79 BPM
ERYTHROCYTE [DISTWIDTH] IN BLOOD BY AUTOMATED COUNT: 13.2 % (ref 11.5–14.5)
ERYTHROCYTE [DISTWIDTH] IN BLOOD BY AUTOMATED COUNT: 47.8 FL (ref 35–45)
GFR SERPL CREATININE-BSD FRML MDRD: 50 ML/MIN/1.73M2
GLUCOSE BLD-MCNC: 104 MG/DL (ref 70–108)
GLUCOSE BLD-MCNC: 127 MG/DL (ref 70–108)
GLUCOSE BLD-MCNC: 163 MG/DL (ref 70–108)
GLUCOSE BLD-MCNC: 165 MG/DL (ref 70–108)
GLUCOSE BLD-MCNC: 166 MG/DL (ref 70–108)
HCT VFR BLD CALC: 31.7 % (ref 37–47)
HEMOGLOBIN: 9.8 GM/DL (ref 12–16)
LACTIC ACID: 2.6 MMOL/L (ref 0.5–2)
MCH RBC QN AUTO: 30.7 PG (ref 26–33)
MCHC RBC AUTO-ENTMCNC: 30.9 GM/DL (ref 32.2–35.5)
MCV RBC AUTO: 99.4 FL (ref 81–99)
PLATELET # BLD: 227 THOU/MM3 (ref 130–400)
PMV BLD AUTO: 9 FL (ref 9.4–12.4)
POTASSIUM REFLEX MAGNESIUM: 4.1 MEQ/L (ref 3.5–5.2)
RBC # BLD: 3.19 MILL/MM3 (ref 4.2–5.4)
SODIUM BLD-SCNC: 142 MEQ/L (ref 135–145)
TOTAL PROTEIN: 5.9 G/DL (ref 6.1–8)
WBC # BLD: 4.8 THOU/MM3 (ref 4.8–10.8)

## 2022-02-02 PROCEDURE — 97166 OT EVAL MOD COMPLEX 45 MIN: CPT

## 2022-02-02 PROCEDURE — 97530 THERAPEUTIC ACTIVITIES: CPT

## 2022-02-02 PROCEDURE — 2580000003 HC RX 258: Performed by: HOSPITALIST

## 2022-02-02 PROCEDURE — G0378 HOSPITAL OBSERVATION PER HR: HCPCS

## 2022-02-02 PROCEDURE — 96372 THER/PROPH/DIAG INJ SC/IM: CPT

## 2022-02-02 PROCEDURE — 96376 TX/PRO/DX INJ SAME DRUG ADON: CPT

## 2022-02-02 PROCEDURE — 99205 OFFICE O/P NEW HI 60 MIN: CPT | Performed by: INTERNAL MEDICINE

## 2022-02-02 PROCEDURE — 97162 PT EVAL MOD COMPLEX 30 MIN: CPT

## 2022-02-02 PROCEDURE — 36415 COLL VENOUS BLD VENIPUNCTURE: CPT

## 2022-02-02 PROCEDURE — 82948 REAGENT STRIP/BLOOD GLUCOSE: CPT

## 2022-02-02 PROCEDURE — 6360000002 HC RX W HCPCS: Performed by: HOSPITALIST

## 2022-02-02 PROCEDURE — 85027 COMPLETE CBC AUTOMATED: CPT

## 2022-02-02 PROCEDURE — 80053 COMPREHEN METABOLIC PANEL: CPT

## 2022-02-02 PROCEDURE — 99226 PR SBSQ OBSERVATION CARE/DAY 35 MINUTES: CPT | Performed by: PHYSICIAN ASSISTANT

## 2022-02-02 PROCEDURE — 97535 SELF CARE MNGMENT TRAINING: CPT

## 2022-02-02 PROCEDURE — 83605 ASSAY OF LACTIC ACID: CPT

## 2022-02-02 PROCEDURE — 6370000000 HC RX 637 (ALT 250 FOR IP): Performed by: HOSPITALIST

## 2022-02-02 PROCEDURE — 6370000000 HC RX 637 (ALT 250 FOR IP): Performed by: INTERNAL MEDICINE

## 2022-02-02 RX ORDER — ASPIRIN 81 MG/1
81 TABLET ORAL DAILY
Status: DISCONTINUED | OUTPATIENT
Start: 2022-02-02 | End: 2022-02-06 | Stop reason: HOSPADM

## 2022-02-02 RX ADMIN — FUROSEMIDE 40 MG: 10 INJECTION, SOLUTION INTRAMUSCULAR; INTRAVENOUS at 10:26

## 2022-02-02 RX ADMIN — ATORVASTATIN CALCIUM 40 MG: 40 TABLET, FILM COATED ORAL at 21:10

## 2022-02-02 RX ADMIN — SODIUM CHLORIDE, PRESERVATIVE FREE 10 ML: 5 INJECTION INTRAVENOUS at 11:47

## 2022-02-02 RX ADMIN — PANTOPRAZOLE SODIUM 40 MG: 40 TABLET, DELAYED RELEASE ORAL at 06:02

## 2022-02-02 RX ADMIN — FOLIC ACID 1 MG: 1 TABLET ORAL at 10:23

## 2022-02-02 RX ADMIN — INSULIN LISPRO 2 UNITS: 100 INJECTION, SOLUTION INTRAVENOUS; SUBCUTANEOUS at 12:42

## 2022-02-02 RX ADMIN — SPIRONOLACTONE 25 MG: 25 TABLET ORAL at 10:23

## 2022-02-02 RX ADMIN — FERROUS SULFATE TAB 325 MG (65 MG ELEMENTAL FE) 325 MG: 325 (65 FE) TAB at 21:09

## 2022-02-02 RX ADMIN — MEMANTINE HYDROCHLORIDE 10 MG: 10 TABLET ORAL at 21:09

## 2022-02-02 RX ADMIN — LEVOTHYROXINE SODIUM 25 MCG: 25 TABLET ORAL at 05:04

## 2022-02-02 RX ADMIN — INSULIN LISPRO 2 UNITS: 100 INJECTION, SOLUTION INTRAVENOUS; SUBCUTANEOUS at 17:34

## 2022-02-02 RX ADMIN — FLUOXETINE 20 MG: 20 CAPSULE ORAL at 10:23

## 2022-02-02 RX ADMIN — SODIUM CHLORIDE, PRESERVATIVE FREE 10 ML: 5 INJECTION INTRAVENOUS at 21:14

## 2022-02-02 RX ADMIN — FUROSEMIDE 40 MG: 10 INJECTION, SOLUTION INTRAMUSCULAR; INTRAVENOUS at 18:10

## 2022-02-02 RX ADMIN — GABAPENTIN 800 MG: 400 CAPSULE ORAL at 10:23

## 2022-02-02 RX ADMIN — FERROUS SULFATE TAB 325 MG (65 MG ELEMENTAL FE) 325 MG: 325 (65 FE) TAB at 10:23

## 2022-02-02 RX ADMIN — MEMANTINE HYDROCHLORIDE 10 MG: 10 TABLET ORAL at 10:23

## 2022-02-02 RX ADMIN — GABAPENTIN 800 MG: 400 CAPSULE ORAL at 21:08

## 2022-02-02 RX ADMIN — ENOXAPARIN SODIUM 40 MG: 100 INJECTION SUBCUTANEOUS at 10:18

## 2022-02-02 RX ADMIN — TRAZODONE HYDROCHLORIDE 100 MG: 100 TABLET ORAL at 21:14

## 2022-02-02 RX ADMIN — ASPIRIN 81 MG: 81 TABLET, COATED ORAL at 21:14

## 2022-02-02 ASSESSMENT — PAIN SCALES - GENERAL
PAINLEVEL_OUTOF10: 6
PAINLEVEL_OUTOF10: 0
PAINLEVEL_OUTOF10: 7
PAINLEVEL_OUTOF10: 0

## 2022-02-02 ASSESSMENT — PAIN DESCRIPTION - PAIN TYPE
TYPE: ACUTE PAIN
TYPE: ACUTE PAIN

## 2022-02-02 ASSESSMENT — PAIN DESCRIPTION - FREQUENCY: FREQUENCY: INTERMITTENT

## 2022-02-02 ASSESSMENT — PAIN DESCRIPTION - DESCRIPTORS: DESCRIPTORS: SHARP

## 2022-02-02 NOTE — ED NOTES
Dr. Scot Arredondo at bedside.       Jazmyn Prescott RN  02/01/22 2040 Normal vision: sees adequately in most situations; can see medication labels, newsprint

## 2022-02-02 NOTE — ED NOTES
MEGHAN to speak with Dr. Josiane Paulino and then to speak with patient and update with plan of care. Patient requesting Felicia Maria to speak with caregiver on phone.       Jj Saunders RN  02/01/22 2004

## 2022-02-02 NOTE — H&P
Hospitalist - History & Physical      Patient: Hazel Barrier    Unit/Bed:5K-28/028-A  YOB: 1960  MRN: [id number]   Acct: [de-identified]   PCP: Tiny Olivarez MD    Date of Service: Pt seen/examined on 02/02/22  and Admitted to Observation with expected LOS less than two midnights due to medical therapy. Chief Complaint: Leg swelling    Assessment and Plan:-  1. Fluid overload -patient is not having any respiratory symptoms. Getting uncomfortable walking around due to significant swelling. Continue Lasix 40 mg IV twice daily. Reassess in the morning. Discharge when patient is more mobile. Patient is noncompliant with fluid restriction as outpatient. 2. COPD -compensated. Continue home medications. 3. History of CAD -currently chest pain-free. Continue medications. 4. Diabetes mellitus -sliding scale insulin. Diabetic diet. 5. Essential hypertension and hyperlipidemia -stable. Continue medications  6. Diabetic neuropathy  7. GERD -on PPI  8. PTSD -continue psych meds. History Of Present Illness: This is a 59-year-old female with mitral regurgitation, CAD, COPD, dementia, diabetes mellitus type 2, GERD, essential hypertension, hyperlipidemia, PTSD presenting with bilateral lower extremity swelling. Patient states that no matter what she does her leg swelling will not improve. Patient's leg swelling was getting worse and patient is getting pain every time she walks due to the swelling. Patient states that her diuretics were rearranged multiple times recently. Patient is supposed to be on fluid restriction but patient admits that she was not compliant with the fluid restriction. Patient was reluctant to be discharged to home. ER requested observation admission. Patient has no other acute medical issue at this time. Patient is urinating well with IV Lasix dose given in ER.       Past Medical History:        Diagnosis Date    Agoraphobia     Anemia     Anxiety     Past Medical History:   Diagnosis Date   â¢ Actinic keratosis 02/26/2008    right cheek   â¢ Cough 02/26/2008    chronic-due to sinus and reflux   â¢ Esophageal reflux 02/26/2008   â¢ h/o diverticulitis    â¢ Hypertrophy (benign) of prostate 02/26/2008    mild to moderate   â¢ Other and unspecified disc disorder of thoracic region 02/27/2008    mild anterior osteophytes and mild anterior disc space narrowing at  few levels   â¢ PAIN BACK  (See also SPONDYLOSIS) 02/26/2008 Asthma     CAD (coronary artery disease)     COPD (chronic obstructive pulmonary disease) (HCC)     Dementia (HCC)     Depression     Diabetes mellitus (HCC)     GERD (gastroesophageal reflux disease)     Hypertension     Medtronic dual pacemaker  1/13/2022    Neuropathy     PTSD (post-traumatic stress disorder)     PVD (peripheral vascular disease) ([facility] [address]     Renal disease     Stage 3       Past Surgical History:        Procedure Laterality Date    CARDIAC CATHETERIZATION      CHOLECYSTECTOMY  2014    CORONARY ANGIOPLASTY WITH STENT PLACEMENT  2019    x2    ESOPHAGEAL MOTILITY STUDY Left 5/24/2019    ESOPHAGEAL MOTILITY/MANOMETRY STUDY performed by Ayden Mccollum MD at [facility]    HAND SURGERY Right     HERNIA REPAIR  2016    HYSTERECTOMY, TOTAL ABDOMINAL  1982    OVARY REMOVAL Bilateral 1982    PACEMAKER PLACEMENT      6/15/2021    TONSILLECTOMY AND ADENOIDECTOMY      TRANSESOPHAGEAL ECHOCARDIOGRAM N/A 9/15/2021    TRANSESOPHAGEAL ECHOCARDIOGRAM performed by Marsha Schmitt MD at [address] [place] [address] Medications:   No current facility-administered medications on file prior to encounter.      Current Outpatient Medications on File Prior to Encounter   Medication Sig Dispense Refill    FLUoxetine (PROZAC) 20 MG capsule Take 20 mg by mouth daily       torsemide (DEMADEX) 20 MG tablet Take 1 tablet by mouth daily 30 tablet 3    metOLazone (ZAROXOLYN) 2.5 MG tablet Take 1 tablet by mouth daily as needed (as directed by CHF clinic) 10 tablet 0    ramelteon (ROZEREM) 8 MG tablet Take 8 mg by mouth nightly      spironolactone (ALDACTONE) 25 MG tablet Take 1 tablet by mouth daily 90 tablet 3    metFORMIN (GLUCOPHAGE) 500 MG tablet Take 1 tablet by mouth 2 times daily (with meals) (Patient taking differently: Take 500 mg by mouth 2 times daily (with meals) 500 mg in the AM  1000 mg at bedtime) 180 tablet 1    pantoprazole (PROTONIX) 40 MG tablet Take 1 tablet by mouth every morning (before breakfast) 30 tablet 2    folic acid (FOLVITE) 1 MG tablet Take 1 mg by mouth daily      ferrous sulfate (IRON 325) 325 (65 Fe) MG tablet Take 325 mg by mouth 2 times daily      traZODone (DESYREL) 100 MG tablet Take 100 mg by mouth nightly      gabapentin (NEURONTIN) 400 MG capsule Take 800 mg by mouth 2 times daily.  memantine ER (NAMENDA XR) 28 MG CP24 extended release capsule Take 28 mg by mouth daily      levothyroxine (SYNTHROID) 25 MCG tablet Take 25 mcg by mouth Daily       atorvastatin (LIPITOR) 40 MG tablet Take 40 mg by mouth daily      Alcohol Swabs (ALCOHOL PREP) 70 % PADS Us e3 imes per day Diagnosis:  Diabetes Non-Insulin Dependent 100 each 11    blood glucose monitor strips Test 3 times a day & as needed for symptoms of irregular blood glucose. Dispense sufficient amount for indicated testing frequency plus additional to accommodate PRN testing needs. 100 strip 0    glucose monitoring kit (FREESTYLE) monitoring kit 1 kit by Does not apply route daily 1 kit 0    nystatin (MYCOSTATIN) 959924 UNIT/GM powder Apply topically 2 times daily Apply topically 2 times daily. Allergies:    Adhesive tape, Baclofen, Benadryl [diphenhydramine], Donepezil, Flexeril [cyclobenzaprine], Norco [hydrocodone-acetaminophen], Percocet [oxycodone-acetaminophen], and Tizanidine    Social History:    reports that she has quit smoking. Her smoking use included cigarettes. She started smoking about 52 years ago. She has a 20.00 pack-year smoking history. She has never used smokeless tobacco. She reports previous alcohol use. She reports previous drug use. Family History:   History reviewed. No pertinent family history. Diet:  ADULT DIET; Regular; 4 carb choices (60 gm/meal); Low Fat/Low Chol/High Fiber/VIKTORIYA; 1500 ml    Review of systems:   Pertinent positives as noted in the HPI. All other systems reviewed and negative.     PHYSICAL EXAM:  BP (!) 120/56   Pulse 69   Temp 98.6 °F (37 °C) (Oral)   Resp 16   Ht 5' 1\" (1.549 m)   Wt 240 lb 4.8 oz (109 kg)   SpO2 95%   BMI 45.40 kg/m²   General appearance: No apparent distress, appears stated age and cooperative. HEENT: Normal cephalic, atraumatic without obvious deformity. Extra ocular muscles intact. Conjunctivae/corneas clear. Neck: Supple, with full range of motion. No jugular venous distention. Trachea midline. Respiratory:  Normal respiratory effort. Clear to auscultation, bilaterally without Rales/Wheezes/Rhonchi. Cardiovascular: Regular rate and rhythm with normal S1/S2. Abdomen: Soft, non-tender, non-distended with normal bowel sounds. Musculoskeletal: Significant bilateral lower extremity edema. Skin: Skin color, texture, turgor normal.  No rashes or lesions. Neurologic:  Cranial nerves: II-XII intact, grossly non-focal.  Psychiatric: Alert and oriented, thought content appropriate, normal insight  Capillary Refill: Brisk,< 3 seconds   Peripheral Pulses: +2 palpable, equal bilaterally     Labs:   Recent Labs     02/01/22  1815 02/02/22  0356   WBC 5.4 4.8   HGB 10.4* 9.8*   HCT 33.3* 31.7*    227     Recent Labs     02/01/22  1815 02/02/22  0356    142   K 4.4 4.1   CL 99 101   CO2 27 28   BUN 21 21   CREATININE 1.2 1.1   CALCIUM 9.3 9.3     Recent Labs     02/01/22  1815 02/02/22  0356   AST 18 14   ALT 13 12   BILIDIR <0.2  --    BILITOT 0.3 0.3   ALKPHOS 84 78     No results for input(s): INR in the last 72 hours. No results for input(s): Desean Killings in the last 72 hours. Urinalysis:    Lab Results   Component Value Date    NITRU NEGATIVE 02/01/2022    WBCUA 3-5 12/27/2017    BACTERIA 2+ 12/27/2017    RBCUA 0-2 12/27/2017    BLOODU NEGATIVE 02/01/2022    SPECGRAV 1.025 12/27/2017    GLUCOSEU NEGATIVE 02/01/2022       Radiology:   XR CHEST (2 VW)   Final Result   1. Moderate hepatomegaly. Permanent dual-chamber pacemaker. 2. No acute findings.  No infiltrates or effusions are seen. **This report has been created using voice recognition software. It may contain minor errors which are inherent in voice recognition technology. **      Final report electronically signed by Dr. Leo Schwab on 2/1/2022 6:59 PM        XR CHEST (2 VW)    Result Date: 2/1/2022  PROCEDURE: XR CHEST (2 VW) CLINICAL INFORMATION: SOB COMPARISON: 6/16/2021 TECHNIQUE: AP upright and lateral views of the chest were obtained. 1. Moderate hepatomegaly. Permanent dual-chamber pacemaker. 2. No acute findings. No infiltrates or effusions are seen. **This report has been created using voice recognition software. It may contain minor errors which are inherent in voice recognition technology. ** Final report electronically signed by Dr. Leo Schwab on 2/1/2022 6:59 PM        EKG: Normal sinus rhythm.   No acute ischemic changes    Electronically signed by Keenan Durán DO on 2/2/2022 at 5:40 AM

## 2022-02-02 NOTE — PROGRESS NOTES
Abraham Lauren 59  INPATIENT OCCUPATIONAL THERAPY  [facility] ONC MED 5K  EVALUATION      Time In: 920  Time Out: 1000  Timed Code Treatment Minutes: 25 Minutes  Minutes: 40       Date: 2022  Patient Name: April Acuña,   Gender: female      MRN: [id number]  : 1960  (57 y.o.)  Referring Practitioner: Cornelia Wadsworth DO  Diagnosis: Fluid overload  Additional Pertinent Hx: Pt. is a 57year old female whom directed by Dr. Brittany Thomas who she is following for CHF management to come to ED. Per review of chart pt has urinary retention, increased difficulty with edema/pain in BLE impacting ability to ambulate. Additionally, it is noted pt experiecing SOB and utilizing 2 L of O2 (wears at home continous). Restrictions/Precautions:  Position Activity Restriction  Other position/activity restrictions: Fall risk, tearful/anxious, fluid restriction   2 L O2 via nasal cannula    Subjective  Chart Reviewed: Yes,Progress Notes,Orders,History and Physical    Subjective: Pt. tearful at times during session with increased anxiety, but able to be redirected and willing to engage at slow/steady pace. Comments: Nurse approved session. Pt. reported she has agorphobia and is very anxious more than 1x during session, nurse made aware.     Pain:  Pain Assessment  Patient Currently in Pain: Yes  Pain Assessment: 0-10  Pain Level: 6  Pain Type: Acute pain (in BLE)    Vitals: Vitals not assessed per clinical judgement, see nursing flowsheet    Social/Functional History:  Lives With: Alone  Type of Home: Apartment (2nd level but utilizes elevator)  Home Access: Elevator  Home Equipment: Lift chair,Oxygen   Bathroom Shower/Tub:  (Tub shower combo with a cut out to be able to walk in/out of)  Bathroom Toilet: Standard  Bathroom Equipment: Grab bars in shower,Shower chair  Bathroom Accessibility: Accessible    Receives Help From: Friend(s) (Pt reports she has asstistance for grocery hopping)  ADL Assistance: Independent  Homemaking Assistance: Independent  Ambulation Assistance: Independent  Transfer Assistance: Independent    Active : No (has not drove in ~10 years, utilizes [place] services.)     Additional Comments: Pt. reports she used to have a , but for the last year she has lived alone in apartment complex. Pt. very particular during session. VISION:WFL    HEARING:  WFL    COGNITION: Decreased Problem Solving and Decreased Safety Awareness Pt. Demo increased distractibility and required cues to re-attend to task. RANGE OF MOTION:  Bilateral Upper Extremity:  WFL    STRENGTH:  Bilateral Upper Extremity:  WFL 4-/5 in BUE    SENSATION:   WFL    ADL:   Grooming: Supervision. at seated level EOB to engage in donning deodorant and hair combing. Continue to progress towards sinkside as appropriate/able. Lower Extremity Dressing: Maximum Assistance. to don non skid slippers. Iris Rued BALANCE:  Sitting Balance:  Supervision at EOB during ADLs  Standing Balance: Contact Guard Assistance. during static stand at EOB, functional mobility    BED MOBILITY:  Supine to Sit: Stand By Assistance . Sit to Supine: Stand By Assistance . TRANSFERS:  Sit to Stand:  [employer], cues for hand placement. .  Stand to Sit: Minimal Assistance, with increased time for completion, cues for hand placement. .    FUNCTIONAL MOBILITY:  Assistive Device: Rolling Walker  Assist Level:  Contact Guard Assistance. Distance: within room ~10 feet. OTR educated to engage in sitting in bedside chair, pt denied at this time and requested to return back to bed. Activity Tolerance:  Patient tolerance of  treatment: fair. Assessment:  Assessment: Pt. engaged in OT session with increased cues initially as pt fearful of movement due to pain in BLE. Once further education provided, pt more willing to engage at reduced pace and pleasant throughout remainder of session.   Pt. presents with the listed performance deficits/impairments impacting ability to perform tasks at [facility]. Pt. requires continued services to adapt/modify routines as needed to progress towards PLOF. Pt. demo reduced insight on capabilities and assistance required to carryout daily tasks to best of abilty. Performance deficits / Impairments: Decreased functional mobility ,Decreased endurance,Decreased ADL status,Decreased balance,Decreased strength,Decreased safe awareness  Prognosis: Fair  REQUIRES OT FOLLOW UP: Yes  Decision Making: Medium Complexity  Safety Devices in place: Yes  Type of devices: All fall risk precautions in place,Left in bed,Bed alarm in place,Call light within reach,Patient at risk for falls,Nurse notified  Restraints  Initially in place: No    Treatment Initiated: Treatment and education initiated within context of evaluation. Evaluation time included review of current medical information, gathering information related to past medical, social and functional history, completion of standardized testing, formal and informal observation of tasks, assessment of data and development of plan of care and goals. Treatment time included skilled education and facilitation of tasks to increase safety and independence with ADL's for improved functional independence and quality of life. Discharge Recommendations:  Continue to assess pending progress (Pt. could benefit from further OT services s/p discharge). Patient Education:  OT Education: OT Role,Plan of Care,Transfer Training,Energy Conservation,ADL Adaptive Strategies  Patient Education: Pt. receptive. Equipment Recommendations: Other: continue to monitor needs throughout as appropriate/able    Plan:  Times per week: 3-5x  Current Treatment Recommendations: Strengthening,Patient/Caregiver Education & Training,Balance Training,Functional Mobility Training,Self-Care / ADL,Safety Education & Training,Neuromuscular Re-education,Endurance Training.   See long-term goal time frame for expected duration of plan of care. If no long-term goals established, a short length of stay is anticipated. Goals:  Patient goals : to have reduced pain  Short term goals  Time Frame for Short term goals: upon d/c  Short term goal 1: Pt. to perform toilet transfers with Supervision with no more than 1 cue for safety to progress towards PLOF. Short term goal 2: Pt. to engage in strengthening interventions to improve BUE strength to 4/5 for ease with ADL transfers. Short term goal 3: Pt. to progress towards standing sinkside to engage in grooming routines with Supervision to enhance I with ADLs. Short term goal 4: Pt. to perform functional mobiltiy to/from and within bathroom with supervision with use of AD to enhance engagement in bathroom related ADLs. Long term goals  Time Frame for Long term goals : no LTG due to ELOS         Following session, patient left in safe position with all fall risk precautions in place.

## 2022-02-02 NOTE — CARE COORDINATION
2/2/22, 7:40 AM EST  DISCHARGE PLANNING EVALUATION:    David Agee       Admitted: 2/1/2022/ [facility] day: 0   Location: -28/028-A Reason for admit: Anxiety state [F41.1]  Bilateral leg edema [R60.0]  Fluid overload [E87.70]  Congestive heart failure, unspecified HF chronicity, unspecified heart failure type ([address] [I50.9]   PMH:  has a past medical history of Agoraphobia, Anemia, Anxiety, Asthma, CAD (coronary artery disease), COPD (chronic obstructive pulmonary disease) ([address] Dementia ([facility] [address] Depression, Diabetes mellitus ([address] GERD (gastroesophageal reflux disease), Hypertension, Medtronic dual pacemaker , Neuropathy, PTSD (post-traumatic stress disorder), PVD (peripheral vascular disease) ([address] and Renal disease. Procedure:   2/1 CXR: 1. Moderate hepatomegaly. Permanent dual-chamber pacemaker. 2. No acute findings. No infiltrates or effusions are seen. Barriers to Discharge:  Hospitalist following for fluid overload. Cardio consult. IV lasix. Room air. PT/OT. PCP: Meera Ledesma MD   %    Patient Goals/Plan/Treatment Preferences: Spoke with Cande Walesoraida, she plans to return home at discharge. She lives in a senior complex and has a great support system from neighbors. She uses [employer] from [address] [place] [address]. She has a rolling walker with seat. She has grab bars in shower, shower chair, and bedside commode. She does not anticipate discharge needs except for a ride home. Transportation/Food Security/Housekeeping Addressed:  No issues identified.

## 2022-02-02 NOTE — ED NOTES
Spoke with LILLIE Moya regarding plan of care, new verbal  medication order received. States that patient will likely be dc'd. Patient updated with plan of care and became tearful and upset with plan of care. Patient refuses medication and then to be sent home. Karen Moya notified.       Vicky Capellan RN  02/01/22 1954

## 2022-02-02 NOTE — ED PROVIDER NOTES
[address] [facility] [address] [address] EMERGENCY DEPT      CHIEF COMPLAINT       Chief Complaint   Patient presents with    Leg Swelling     bilateral       Nurses Notes reviewed and I agree except as noted in the HPI. HISTORY OF PRESENT ILLNESS    Pilo Osborne is a 57 y.o. female who presents for bilateral leg swelling over the past 10 years, but has worsened over the past two months. She states that it is painful to the touch or to walk. She also notes urinary retention stating that it feels like she needs to go, but she can't. The patient has been seen in CHF clinic and by Dr. José Multani this past month. Patient reports Dr. José Multani called her and told her to come to the ER for admission. She denies headache, fever, nausea, emesis, diarrhea, constipation, cough, chest pain, dysuria. She notes that her father  of a heart attack, and her brother had a heart attack too. Patient also endorses increased stress and anxiety. REVIEW OF SYSTEMS     Review of Systems   Constitutional: Negative for activity change, appetite change, chills, fatigue and fever. HENT: Negative for congestion, ear pain, rhinorrhea, sinus pressure and sore throat. Eyes: Negative for discharge and itching. Respiratory: Negative for cough and shortness of breath. Cardiovascular: Negative for chest pain. Gastrointestinal: Negative for abdominal pain, diarrhea, nausea and vomiting. Endocrine: Negative for polyuria. Genitourinary: Positive for difficulty urinating. Negative for dysuria and frequency. Musculoskeletal: Positive for gait problem and myalgias. Skin: Negative for rash. Neurological: Negative for weakness and light-headedness. Hematological: Negative for adenopathy. Psychiatric/Behavioral: Negative for confusion, sleep disturbance and suicidal ideas. The patient is nervous/anxious.          PAST MEDICAL HISTORY    has a past medical history of Agoraphobia, Anemia, Anxiety, Asthma, CAD (coronary artery disease), COPD (chronic obstructive pulmonary disease) ([facility] [address] Dementia ([facility] [address] Depression, Diabetes mellitus ([facility] [address] GERD (gastroesophageal reflux disease), Hypertension, [employer]onic dual pacemaker , Neuropathy, PTSD (post-traumatic stress disorder), PVD (peripheral vascular disease) ([facility] [address] and Renal disease. SURGICAL HISTORY      has a past surgical history that includes Upper gastrointestinal endoscopy; esophageal motility study (Left, 5/24/2019); hernia repair (2016); Cholecystectomy (2014); Tonsillectomy and adenoidectomy; Hand surgery (Right); Cardiac catheterization; Coronary angioplasty with stent (2019); Hysterectomy, total abdominal (1982); Ovary removal (Bilateral, 1982); pacemaker placement; and transesophageal echocardiogram (N/A, 9/15/2021). CURRENT MEDICATIONS       Previous Medications    ALCOHOL SWABS (ALCOHOL PREP) 70 % PADS    Us e3 imes per day Diagnosis:  Diabetes Non-Insulin Dependent    ATORVASTATIN (LIPITOR) 40 MG TABLET    Take 40 mg by mouth daily    BLOOD GLUCOSE MONITOR STRIPS    Test 3 times a day & as needed for symptoms of irregular blood glucose. Dispense sufficient amount for indicated testing frequency plus additional to accommodate PRN testing needs. FERROUS SULFATE (IRON 325) 325 (65 FE) MG TABLET    Take 325 mg by mouth 2 times daily    FLUOXETINE (PROZAC) 20 MG CAPSULE    Take 20 mg by mouth daily     FOLIC ACID (FOLVITE) 1 MG TABLET    Take 1 mg by mouth daily    GABAPENTIN (NEURONTIN) 400 MG CAPSULE    Take 800 mg by mouth 2 times daily.      GLUCOSE MONITORING KIT (FREESTYLE) MONITORING KIT    1 kit by Does not apply route daily    LEVOTHYROXINE (SYNTHROID) 25 MCG TABLET    Take 25 mcg by mouth Daily     MEMANTINE ER (NAMENDA XR) 28 MG CP24 EXTENDED RELEASE CAPSULE    Take 28 mg by mouth daily    METFORMIN (GLUCOPHAGE) 500 MG TABLET    Take 1 tablet by mouth 2 times daily (with meals)    METOLAZONE (ZAROXOLYN) 2.5 MG TABLET    Take 1 tablet by mouth daily as needed (as directed by CHF clinic) NYSTATIN (MYCOSTATIN) 899195 UNIT/GM POWDER    Apply topically 2 times daily Apply topically 2 times daily. PANTOPRAZOLE (PROTONIX) 40 MG TABLET    Take 1 tablet by mouth every morning (before breakfast)    RAMELTEON (ROZEREM) 8 MG TABLET    Take 8 mg by mouth nightly    SPIRONOLACTONE (ALDACTONE) 25 MG TABLET    Take 1 tablet by mouth daily    TORSEMIDE (DEMADEX) 20 MG TABLET    Take 1 tablet by mouth daily    TRAZODONE (DESYREL) 100 MG TABLET    Take 100 mg by mouth nightly       ALLERGIES     is allergic to adhesive tape, baclofen, benadryl [diphenhydramine], donepezil, flexeril [cyclobenzaprine], norco [hydrocodone-acetaminophen], percocet [oxycodone-acetaminophen], and tizanidine. FAMILY HISTORY     has no family status information on file. family history is not on file. SOCIAL HISTORY    reports that she has quit smoking. Her smoking use included cigarettes. She started smoking about 52 years ago. She has a 20.00 pack-year smoking history. She has never used smokeless tobacco. She reports previous alcohol use. She reports previous drug use. PHYSICAL EXAM     INITIAL VITALS:  height is 5' 1\" (1.549 m) and weight is 242 lb (109.8 kg). Her oral temperature is 98.5 °F (36.9 °C). Her blood pressure is 113/51 (abnormal) and her pulse is 84. Her respiration is 16 and oxygen saturation is 98%. Physical Exam  Vitals and nursing note reviewed. Constitutional:       General: She is not in acute distress. Appearance: She is well-developed. She is obese. She is not toxic-appearing or diaphoretic. HENT:      Head: Normocephalic and atraumatic. Right Ear: Hearing normal.      Left Ear: Hearing normal.      Nose: Nose normal. No rhinorrhea. Mouth/Throat:      Pharynx: Uvula midline. No oropharyngeal exudate. Eyes:      General: Lids are normal. No scleral icterus. Conjunctiva/sclera: Conjunctivae normal.      Pupils: Pupils are equal, round, and reactive to light.    Neck: Trachea: No tracheal deviation. Cardiovascular:      Rate and Rhythm: Normal rate and regular rhythm. Heart sounds: Normal heart sounds. No murmur heard. Pulmonary:      Effort: Pulmonary effort is normal. No respiratory distress. Breath sounds: Normal breath sounds. No stridor. No decreased breath sounds or wheezing. Abdominal:      General: There is no distension. Palpations: Abdomen is soft. Abdomen is not rigid. Tenderness: There is no abdominal tenderness. Musculoskeletal:         General: Normal range of motion. Cervical back: Normal range of motion and neck supple. No rigidity. Right lower leg: Edema present. Left lower leg: Edema present. Comments: Movement normal as observed   Lymphadenopathy:      Cervical: No cervical adenopathy. Skin:     General: Skin is warm and dry. Coloration: Skin is not pale. Findings: No rash. Neurological:      Mental Status: She is alert and oriented to person, place, and time. Gait: Gait normal.      Comments: No gross deficits observed   Psychiatric:         Mood and Affect: Mood normal.         Speech: Speech normal.         Behavior: Behavior normal.         Thought Content:  Thought content normal.         DIFFERENTIAL DIAGNOSIS:   Including but not limited to: Fluid retention, CHF, medication noncompliance, uncontrolled diabetes, anxiety, stress reaction    DIAGNOSTIC RESULTS     EKG: All EKG's are interpreted [facility] who either signs or Co-signs this chart in the absence of a cardiologist.  Ventricular Rate BPM 79 P    Atrial Rate BPM 79 P    P-R Interval ms 160 P    QRS Duration ms 94 P    Q-T Interval ms 394 P    QTc Calculation (Bazett) ms 451 P    P Axis degrees 79 P    R Axis degrees 57 P    T Dorothy degrees 67 P           Narrative & Impression    Normal sinus rhythm  Normal ECG  When compared with ECG of 15-MOISE-2021 15:20,  Sinus rhythm has replaced Electronic ventricular pacemaker               RADIOLOGY: non-plain film images(s) such as CT,Ultrasound and MRI are read by the radiologist.  Plain radiographic images are visualized and preliminarily interpreted by the emergency physician unless otherwise stated below. XR CHEST (2 VW)   Final Result   1. Moderate hepatomegaly. Permanent dual-chamber pacemaker. 2. No acute findings. No infiltrates or effusions are seen. **This report has been created using voice recognition software. It may contain minor errors which are inherent in voice recognition technology. **      Final report electronically signed by Dr. Albert Sheth on 2/1/2022 6:59 PM          LABS:   Labs Reviewed   BASIC METABOLIC PANEL W/ REFLEX TO MG FOR LOW K - Abnormal; Notable for the following components:       Result Value    Glucose 143 (*)     All other components within normal limits   CBC WITH AUTO DIFFERENTIAL - Abnormal; Notable for the following components:    RBC 3.38 (*)     Hemoglobin 10.4 (*)     Hematocrit 33.3 (*)     MCHC 31.2 (*)     RDW-SD 47.6 (*)     All other components within normal limits   GLOMERULAR FILTRATION RATE, ESTIMATED - Abnormal; Notable for the following components:    Est, Glom Filt Rate 45 (*)     All other components within normal limits   BRAIN NATRIURETIC PEPTIDE   TROPONIN   HEPATIC FUNCTION PANEL   ANION GAP   OSMOLALITY   URINE RT REFLEX TO CULTURE       EMERGENCY DEPARTMENT COURSE:   Vitals:    Vitals:    02/01/22 1753 02/01/22 1949 02/01/22 1951 02/01/22 2034   BP: (!) 124/52 (!) 131/119  (!) 113/51   Pulse: 77  84    Resp: 16      Temp: 98.5 °F (36.9 °C)      TempSrc: Oral      SpO2: 98% 100%  98%   Weight: 242 lb (109.8 kg)      Height: 5' 1\" (1.549 m)          Patient was seen in the emergency department during the global pandemic, when there was surge capacity and regional healthcare crisis. MDM:  The patient was seen and evaluated within the ED today for leg swelling.  On exam, I appreciated bilateral leg swelling. Old records were reviewed. Within the department, I observed the patient's vital signs to be within acceptable range. Radiological studies within the department revealed no acute intra cardiopulmonary process. Laboratory work was reassuring. Within the department the patient was treated with Lasix. I observed the patient's condition to modestly improve during the duration of their stay. On re-exam patient was playing on her iPad. There is no respiratory distress. Vital signs remained stable. The patient remained non-toxic appearing with no distress evident in the ER. Dr. Willis Ng was consulted and advised admitting the patient for CHF. Our hospitalist Dr. Sera Pat was consulted and graciously accepted admission. The patient was admitted to the hospital in fair condition. CRITICAL CARE:   None    CONSULTS:  2005: Dr. Hemal Maldonado (cardiology)  2035: Dr. Richie England (hospitalist)    PROCEDURES:  None    FINAL IMPRESSION      1. Congestive heart failure, unspecified HF chronicity, unspecified heart failure type ([address]    2. Bilateral leg edema    3. Anxiety state          DISPOSITION/PLAN     1. Congestive heart failure, unspecified HF chronicity, unspecified heart failure type ([address]    2. Bilateral leg edema    3.  Anxiety state    Admission        (Please note that portions of this note were completed with a voice recognition program.  Efforts were made to edit the dictations but occasionally words are mis-transcribed.)    Doroteo Fajardo PA-C 02/01/22 8:46 PM    Lemon Decant, PA-C        Lemon Decant, PA-C  02/01/22 2046

## 2022-02-02 NOTE — PROGRESS NOTES
[facility]  INPATIENT PHYSICAL THERAPY  EVALUATION  [facility] ONC MED 5K - 5K-28/028-A    Time In: 0561  Time Out: 1406  Timed Code Treatment Minutes: 14 Minutes  Minutes: 24          Date: 2022  Patient Name: Castro Monson,  Gender:  female        MRN: [id number]  : 1960  (57 y.o.)      Referring Practitioner: Silvino More DO  Diagnosis: anxiety state  Additional Pertinent Hx: Per EMR \"This is a 78-year-old female with mitral regurgitation, CAD, COPD, dementia, diabetes mellitus type 2, GERD, essential hypertension, hyperlipidemia, PTSD presenting with bilateral lower extremity swelling. Patient states that no matter what she does her leg swelling will not improve. Patient's leg swelling was getting worse and patient is getting pain every time she walks due to the swelling. Patient states that her diuretics were rearranged multiple times recently. Patient is supposed to be on fluid restriction but patient admits that she was not compliant with the fluid restriction. Patient was reluctant to be discharged to home. ER requested observation admission. Patient has no other acute medical issue at this time. Patient is urinating well with IV Lasix dose given in ER. \"     Restrictions/Precautions:  Restrictions/Precautions: General Precautions,Fall Risk  Position Activity Restriction  Other position/activity restrictions: Fall risk, tearful/anxious, fluid restriction    Subjective:  Chart Reviewed: Yes  Patient assessed for rehabilitation services?: Yes  Family / Caregiver Present: No  Subjective: OK to see pt per nursing. Pt in bed when PT arrived, required mod encouragement to complete sesssion, pt became tearful, support provided. Pt willing to amb and complete mobility. Pt declined sitting in chair, did not give reason as to why.     General:  Overall Orientation Status: Within Normal Limits  Follows Commands: Within Functional Limits    Vision: Impaired  Vision Exceptions: Wears glasses for reading    Hearing: Within functional limits         Pain: B LE pain, did not quantify when asked, reports it is like \"sharp glass stabbing me\"    Vitals: Vitals not assessed per clinical judgement, see nursing flowsheet    Social/Functional History:    Lives With: Alone  Type of Home: Apartment (2nd level but utilizes elevator)  Home Access: Elevator  Home Equipment: Oxygen,4 wheeled walker     Bathroom Shower/Tub:  (Tub shower combo with a cut out to be able to walk in/out of)  Bathroom Toilet: Standard  Bathroom Equipment: Commode  Bathroom Accessibility: Accessible    Receives Help From: Friend(s) (Pt reports she has asstistance for grocery hopping)  ADL Assistance: Independent  Homemaking Assistance: Independent  Ambulation Assistance: Independent  Transfer Assistance: Independent    Active : No (has not drove in ~10 years, utilizes [employer].)     Additional Comments: Pt. reports she used to have a , but for the last year she has lived alone in apartment complex. Pt amb with 4ww, IND with all tasks with rest breaks required.     OBJECTIVE:  Range of Motion:  Bilateral Lower Extremity: WFL    Strength:  Bilateral Lower Extremity: WFL    Balance:  Static Sitting Balance:  Supervision  Dynamic Sitting Balance: Supervision, Stand By Assistance  Static Standing Balance: Stand By Assistance, Contact Guard Assistance  Dynamic Standing Balance: Contact Guard Assistance  RW for support, cues for safety  Bed Mobility:  Rolling to Left: Stand By Assistance, X 1, with head of bed raised, with rail   Supine to Sit: Stand By Assistance, X 1, with head of bed raised, with rail  Sit to Supine: Stand By Assistance, X 1, with head of bed raised, with rail     Transfers:  Sit to Stand: [address], X 1, with verbal cues  Stand to [facility] [address], X 1, with verbal cues  RW for support, cues for safety  Ambulation:  Contact Guard Assistance, X 1, with cues for safety, with verbal cues   Distance: 15 feet  Surface: Level Tile  Device:Rolling Walker  Gait Deviations:  Slow Regine, Decreased Step Length Bilaterally, Decreased Gait Speed and Decreased Heel Strike Bilaterally  Cues for safety, no LOB noted    Functional Outcome Measures: Completed  -PAC Inpatient Mobility without Stair Climbing Raw Score : 15  -PAC Inpatient without Stair Climbing T-Scale Score : 43.03    ASSESSMENT:  Activity Tolerance:  Patient tolerance of  treatment: fair. Reduced endurance, tearful, anxiety with all tasks      Treatment Initiated: Treatment and education initiated within context of evaluation. Evaluation time included review of current medical information, gathering information related to past medical, social and functional history, completion of standardized testing, formal and informal observation of tasks, assessment of data and development of plan of care and goals. Treatment time included skilled education and facilitation of tasks to increase safety and independence with functional mobility for improved independence and quality of life. Assessment: Body structures, Functions, Activity limitations: Decreased functional mobility ,Increased pain,Decreased balance,Decreased strength,Decreased safe awareness,Decreased endurance  Assessment: Pt cont to require skilled PT services to increase strength and endurance to increase IND with overall mobility to return home safely.   Prognosis: Fair    REQUIRES PT FOLLOW UP: Yes    Discharge Recommendations:  Discharge Recommendations: Continue to assess pending progress,Patient would benefit from continued therapy after discharge,Home with Home health PT    Patient Education:  PT Education: Gabrielle Allen,PT Role,Plan of Care,Functional Mobility Training,Equipment,Transfer Training    Equipment Recommendations:  Equipment Needed: No    Plan:  Times per week: 3-5x GM  Current Treatment Recommendations: Strengthening,Home Exercise Program,Neuromuscular Re-education,Safety Education & Merline Maxwelluffer Training,Patient/Caregiver Education & Training,Functional Mobility Training,Equipment Evaluation, Education, & procurement,Transfer Training,Gait Training,Positioning    Goals:  Patient goals : go home  Short term goals  Time Frame for Short term goals: by discharge  Short term goal 1: Pt will amb with RW with IND for >50 feet to complete HH distances. Short term goal 2: Pt will demo IND with transfers with RW for support to progress towards PLOF. Short term goal 3: Pt will demo IND with bed mobility tasks with good technique to return home safely. Long term goals  Time Frame for Long term goals : NA due to short ELOS    Following session, patient left in safe position with all fall risk precautions in place. Pt back in bed following session, all needs and call light in reach with bed alarm on.

## 2022-02-02 NOTE — PROGRESS NOTES
Hospitalist Progress Note      Patient:  Roz Rossi    Unit/Bed:5K-28/028-A  YOB: 1960  MRN: [id number]   Acct: [de-identified]   PCP: Lady Morales MD  Date of Admission: 2/1/2022    Assessment/Plan:    1. B/L LE edema / fluid overload: Per patient she has had multiple adjustments lately to her diuretics. Established with Dr. Brittney Spicer in cardiology. Cardiology has been consulted. Continue with Lasix 40 mg IV twice daily. Strict I/os. Daily weights. Per cardiology, once diuresing well we will continue torsemide. Continue Aldactone. BMP in a.m. to monitor kidney function  2. Chronic hypoxic respiratory failure / COPD without acute exacerbation: Patient stable on 2 L NC which she reports is her baseline. No home inhalers listed. 3. CAD: ASA, statin  4. Essential hypertension: Resume home meds, continue to monitor BP  5. NIDDM II with polyneuropathy: Hold home Metformin. SSI. Accu. Hypoglycemia protocol in place. Carb controlled diet, fluid restriction. 6. GERD: PPI  7. Hyperlipidemia: Statin  8. Acquired hypothyroidism: Continue daily home Synthroid  9. PTSD: Continue home medications    Chief Complaint: Leg swelling    Initial H and P:-    \"This is a 69-year-old female with mitral regurgitation, CAD, COPD, dementia, diabetes mellitus type 2, GERD, essential hypertension, hyperlipidemia, PTSD presenting with bilateral lower extremity swelling. Patient states that no matter what she does her leg swelling will not improve. Patient's leg swelling was getting worse and patient is getting pain every time she walks due to the swelling. Patient states that her diuretics were rearranged multiple times recently. Patient is supposed to be on fluid restriction but patient admits that she was not compliant with the fluid restriction. Patient was reluctant to be discharged to home. ER requested observation admission.   Patient has no other acute medical issue at this time. Patient is urinating well with IV Lasix dose given in ER. \"    Subjective (past 24 hours):   2/2: pt doing well, laughing and joking lying in bed. Reports good urine output and no dysuria. Denies CP/SOB. States her swelling has improved some. Past medical history, family history, social history and allergies reviewed again and is unchanged since admission. ROS (All review of systems completed. Pertinent positives noted. Otherwise All other systems reviewed and negative.)     Medications:  Reviewed    Infusion Medications    sodium chloride       Scheduled Medications    aspirin  81 mg Oral Daily    atorvastatin  40 mg Oral Daily    FLUoxetine  20 mg Oral Daily    ferrous sulfate  325 mg Oral BID    folic acid  1 mg Oral Daily    gabapentin  800 mg Oral BID    levothyroxine  25 mcg Oral Daily    memantine  10 mg Oral BID    pantoprazole  40 mg Oral QAM AC    spironolactone  25 mg Oral Daily    traZODone  100 mg Oral Nightly    insulin lispro  0-12 Units SubCUTAneous TID WC    insulin lispro  0-6 Units SubCUTAneous Nightly    furosemide  40 mg IntraVENous BID    sodium chloride flush  10 mL IntraVENous 2 times per day    enoxaparin  40 mg SubCUTAneous Daily     PRN Meds: melatonin, sodium chloride flush, sodium chloride, polyethylene glycol, acetaminophen **OR** acetaminophen, ondansetron      Intake/Output Summary (Last 24 hours) at 2/2/2022 1247  Last data filed at 2/2/2022 0603  Gross per 24 hour   Intake 100 ml   Output 1050 ml   Net -950 ml       Diet:  ADULT DIET; Regular; 4 carb choices (60 gm/meal); Low Fat/Low Chol/High Fiber/VIKTORIYA; 1500 ml    Exam:  /62   Pulse 73   Temp 97.6 °F (36.4 °C) (Oral)   Resp 16   Ht 5' 1\" (1.549 m)   Wt 240 lb 4.8 oz (109 kg)   SpO2 94%   BMI 45.40 kg/m²   General appearance: Severely obese, no apparent distress, appears stated age and cooperative. HEENT: Pupils equal, round, and reactive to light. Conjunctivae/corneas clear. Neck: Supple, with full range of motion. No jugular venous distention. Trachea midline. Respiratory:  Normal respiratory effort. Clear to auscultation, bilaterally without Rales/Wheezes/Rhonchi. Cardiovascular: Regular rate and rhythm with normal S1/S2 without murmurs, rubs or gallops. Abdomen: Soft, non-tender, non-distended with normal bowel sounds. Musculoskeletal: passive and active ROM x 4 extremities. +3 edema in bilateral lower extremities  Skin: Skin color, texture, turgor normal.  No rashes or lesions. Neurologic:  Neurovascularly intact without any focal sensory/motor deficits. Cranial nerves: II-XII intact, grossly non-focal.  Psychiatric: Alert and oriented x4, thought content appropriate, normal insight  Capillary Refill: Brisk,< 3 seconds   Peripheral Pulses: +2 palpable, equal bilaterally     Labs:   Recent Labs     02/01/22  1815 02/02/22  0356   WBC 5.4 4.8   HGB 10.4* 9.8*   HCT 33.3* 31.7*    227     Recent Labs     02/01/22  1815 02/02/22  0356    142   K 4.4 4.1   CL 99 101   CO2 27 28   BUN 21 21   CREATININE 1.2 1.1   CALCIUM 9.3 9.3     Recent Labs     02/01/22  1815 02/02/22  0356   AST 18 14   ALT 13 12   BILIDIR <0.2  --    BILITOT 0.3 0.3   ALKPHOS 84 78     No results for input(s): INR in the last 72 hours. No results for input(s): Ronald Bares in the last 72 hours.     Microbiology:    Blood culture #1: No results found for: BC    Blood culture #2:No results found for: Basom Due    Organism:No results found for: ORG    No results found for: LABGRAM    MRSA culture only:No results found for: [facility]    Urine culture:   Lab Results   Component Value Date    LABURIN SEE BELOW 12/27/2017       Respiratory culture: No results found for: CULTRESP    Aerobic and Anaerobic :  No results found for: LABAERO  No results found for: LABANAE    Urinalysis:      Lab Results   Component Value Date    NITRU NEGATIVE 02/01/2022    WBCUA 3-5 12/27/2017 BACTERIA 2+ 12/27/2017    RBCUA 0-2 12/27/2017    BLOODU NEGATIVE 02/01/2022    SPECGRAV 1.025 12/27/2017    GLUCOSEU NEGATIVE 02/01/2022       Radiology:  XR CHEST (2 VW)   Final Result   1. Moderate hepatomegaly. Permanent dual-chamber pacemaker. 2. No acute findings. No infiltrates or effusions are seen. **This report has been created using voice recognition software. It may contain minor errors which are inherent in voice recognition technology. **      Final report electronically signed by Dr. Charles Ponce on 2/1/2022 6:59 PM        XR CHEST (2 VW)    Result Date: 2/1/2022  PROCEDURE: XR CHEST (2 VW) CLINICAL INFORMATION: SOB COMPARISON: 6/16/2021 TECHNIQUE: AP upright and lateral views of the chest were obtained. 1. Moderate hepatomegaly. Permanent dual-chamber pacemaker. 2. No acute findings. No infiltrates or effusions are seen. **This report has been created using voice recognition software. It may contain minor errors which are inherent in voice recognition technology. ** Final report electronically signed by Dr. Charles Ponce on 2/1/2022 6:59 PM      Electronically signed by Santos Mann PA-C on 2/2/2022 at 12:47 PM

## 2022-02-02 NOTE — CONSULTS
relaxers, Lethargy    Benadryl [Diphenhydramine]      rash    Donepezil     Flexeril [Cyclobenzaprine]      shakes    Norco [Hydrocodone-Acetaminophen] Other (See Comments)     Muscle weakness    Percocet [Oxycodone-Acetaminophen]     Tizanidine         History reviewed. No pertinent family history. Social History     Socioeconomic History    Marital status:      Spouse name: Not on file    Number of children: Not on file    Years of education: Not on file    Highest education level: Not on file   Occupational History    Not on file   Tobacco Use    Smoking status: Former Smoker     Packs/day: 0.50     Years: 40.00     Pack years: 20.00     Types: Cigarettes     Start date: 1/1/1970    Smokeless tobacco: Never Used   Substance and Sexual Activity    Alcohol use: Not Currently    Drug use: Not Currently    Sexual activity: Not Currently   Other Topics Concern    Not on file   Social History Narrative    Not on file     Social Determinants of Health     Financial Resource Strain:     Difficulty of Paying Living Expenses: Not on file   Food Insecurity:     Worried About [address] [employer] in the Last Year: Not on file    Luigi of Food in the Last Year: Not on file   Transportation Needs:     Lack of Transportation (Medical): Not on file    Lack of Transportation (Non-Medical):  Not on file   Physical Activity:     Days of Exercise per Week: Not on file    Minutes of Exercise per Session: Not on file   Stress:     Feeling of Stress : Not on file   Social Connections:     Frequency of Communication with Friends and Family: Not on file    Frequency of Social Gatherings with Friends and Family: Not on file    Attends [employer] Services: Not on file    Active Member of Clubs or Organizations: Not on file    Attends Club or Organization Meetings: Not on file    Marital Status: Not on file   Intimate Partner Violence:     Fear of Current or Ex-Partner: Not on file   [employer] Abused: Not on file    Physically Abused: Not on file    Sexually Abused: Not on file   Housing Stability:     Unable to Pay for Housing in the Last Year: Not on file    Number of Places Lived in the Last Year: Not on file    Unstable Housing in the Last Year: Not on file       Current Facility-Administered Medications   Medication Dose Route Frequency Provider Last Rate Last Admin    atorvastatin (LIPITOR) tablet 40 mg  40 mg Oral Daily [facility], DO        FLUoxetine (PROZAC) capsule 20 mg  20 mg Oral Daily [facility], DO        ferrous sulfate (IRON 325) tablet 325 mg  325 mg Oral BID [facility], DO        folic acid (FOLVITE) tablet 1 mg  1 mg Oral Daily [facility], DO        gabapentin (NEURONTIN) capsule 800 mg  800 mg Oral BID [facility], DO        levothyroxine (SYNTHROID) tablet 25 mcg  25 mcg Oral Daily [facility], DO   25 mcg at 02/02/22 0504    memantine (NAMENDA) tablet 10 mg  10 mg Oral BID [facility], DO        pantoprazole (PROTONIX) tablet 40 mg  40 mg Oral QAM AC [facility], DO   40 mg at 02/02/22 0602    melatonin tablet 4.5 mg  4.5 mg Oral Nightly PRN [facility], DO        spironolactone (ALDACTONE) tablet 25 mg  25 mg Oral Daily [facility], DO        traZODone (DESYREL) tablet 100 mg  100 mg Oral Nightly [facility], DO        insulin lispro (HUMALOG) injection vial 0-12 Units  0-12 Units SubCUTAneous TID WC [facility], DO        insulin lispro (HUMALOG) injection vial 0-6 Units  0-6 Units SubCUTAneous Nightly [facility], DO        furosemide (LASIX) injection 40 mg  40 mg IntraVENous BID [facility], DO        sodium chloride flush 0.9 % injection 10 mL  10 mL IntraVENous 2 times per day [facility], DO        sodium chloride flush 0.9 % injection 10 mL  10 mL IntraVENous PRN [facility], DO        0.9 % sodium chloride infusion  25 mL IntraVENous PRN [facility], DO        enoxaparin (LOVENOX) injection 40 mg  40 mg SubCUTAneous Daily [facility], DO        polyethylene glycol (GLYCOLAX) packet 17 g  17 g Oral Daily PRN [facility] [place], DO        acetaminophen (TYLENOL) tablet 650 mg  650 mg Oral Q6H PRN [facility] [place], DO        Or    acetaminophen (TYLENOL) suppository 650 mg  650 mg Rectal Q6H PRN [facility] [place], DO        ondansetron [facility]) injection 4 mg  4 mg IntraVENous Q6H PRN [place]j [place], DO           Review of Systems -     General ROS: negative  Psychological ROS: negative  Hematological and Lymphatic ROS: No history of blood clots or bleeding disorder. Respiratory ROS: no cough,  or wheezing, the rest see HPI  Cardiovascular ROS: See HPI  Gastrointestinal ROS: negative  Genito-Urinary ROS: no dysuria, trouble voiding, or hematuria  Musculoskeletal ROS: negative  Neurological ROS: no TIA or stroke symptoms  Dermatological ROS: negative      Blood pressure 130/62, pulse 73, temperature 97.6 °F (36.4 °C), temperature source Oral, resp. rate 16, height 5' 1\" (1.549 m), weight 240 lb 4.8 oz (109 kg), SpO2 94 %.         Physical Examination:    General appearance - alert, well appearing, and in no distress  HEENT- Pink conjunctiva  , Non-icteri sclera,PERRLA  Mental status - alert, oriented to person, place, and time  Neck - supple, no significant adenopathy, no JVD, or carotid bruits  Chest - clear to auscultation, no wheezes, rales or rhonchi, symmetric air entry  Heart - normal rate, regular rhythm, normal S1, S2, no murmurs, rubs, clicks or gallops  Abdomen - soft, nontender, nondistended, no masses or organomegaly  EDENILSON- no CVA or flank tenderness, no suprapubic tenderness  Neurological - alert, oriented, normal speech, no focal findings or movement disorder noted  Musculoskeletal/limbs - no joint tenderness, deformity or swelling   - peripheral pulses normal, no pedal edema, no clubbing or cyanosis  Skin - normal coloration and turgor, no rashes, no suspicious skin lesions noted  Psych- appropriate mood and affect    Lab  No results for input(s): CKTOTAL, CKMB, [place] in the last 72 hours. CBC:   Lab Results   Component Value Date    WBC 4.8 02/02/2022    RBC 3.19 02/02/2022    RBC 4.52 12/27/2017    HGB 9.8 02/02/2022    HCT 31.7 02/02/2022    MCV 99.4 02/02/2022    MCH 30.7 02/02/2022    MCHC 30.9 02/02/2022    RDW 13.5 12/27/2017     02/02/2022    MPV 9.0 02/02/2022     BMP:    Lab Results   Component Value Date     02/02/2022    K 4.1 02/02/2022     02/02/2022    CO2 28 02/02/2022    BUN 21 02/02/2022    LABALBU 3.6 02/02/2022    CREATININE 1.1 02/02/2022    CALCIUM 9.3 02/02/2022    LABGLOM 50 02/02/2022    GLUCOSE 104 02/02/2022    GLUCOSE 107 12/27/2017     Hepatic Function Panel:    Lab Results   Component Value Date    ALKPHOS 78 02/02/2022    ALT 12 02/02/2022    AST 14 02/02/2022    PROT 5.9 02/02/2022    BILITOT 0.3 02/02/2022    BILITOT 0.7 12/27/2017    BILIDIR <0.2 02/01/2022    LABALBU 3.6 02/02/2022     Magnesium:    Lab Results   Component Value Date    MG 1.8 06/12/2021     Warfarin PT/INR:  No components found for: PTPATWAR, PTINRWAR  HgBA1c:    Lab Results   Component Value Date    LABA1C 6.3 12/27/2017     FLP:    Lab Results   Component Value Date    TRIG 124 12/27/2017     TSH:    Lab Results   Component Value Date    TSH 2.200 06/09/2021        CORONARY ANGIOGRAM:  LEFT MAIN:  Mild luminal irregularities but patent without any  significant obstruction.     LAD:  30% stenosis proximally and with a patent stent to the proximal  segment, distally has luminal irregularities with no significant  obstruction. Small diagonal branch without any significant obstruction.     LCX:  Patent in the proximal segment. No significant obstruction. Bifurcates into a small AV groove branch and a larger OM1 branch without  any significant obstruction.     RCA:  Luminal irregularities throughout without any significant  obstruction. Bifurcates into a very small PLB and larger PDA.   RCA is  dominant.     LV:  LV systolic pressure is 118.  No significant LV to AO systolic  gradient. LVEDP is 30. LVEF is 45 to 50%. Mild to moderate dilation. Aortic valve is tricuspid. No significant aneurysm or dissection of  the visualized portion of the aorta. There is 4+ mitral regurgitation  with reflux into the pulmonary veins.     IMMEDIATE COMPLICATIONS:  None.     MEDICATIONS:  See EMR.     ACCESS:  6-[place] Angio-Seal was used for hemostasis.     ESTIMATED BLOOD LOSS:  Less than 10 mL.     SUMMARY:  Severe mitral regurgitation; no significant coronary artery  disease; elevated right heart cath pressures with preserved cardiac  output.     PLAN:  1. Bedrest.  2.  Optimal medical therapy. 3.  Risk factor management. 4.  Routine access site care. 5.  Guideline-directed therapy for acute heart failure. 6.  Uptitrate diuretics. 8.  Holding mitral valve workup as outpatient. 9.  CT Surgery consultation. 10.  Follow up with myself in two to four weeks postprocedure.     All the above was explained to the patient and the patient's family. They are agreeable and amenable to the above plan.           Emerald Sauer MD     D: 06/13/2021 4:26:11      Conclusions      Summary   Probe easily inserted by Cardiologist.   Procedure performed without complications. The transesophageal approach was used. Risk benefits and alternatives were explained to the patient and informed   consent was obtained. Ejection fraction was estimated at 50-55%. Left atrial size was severely dilated with no thrombus identified. The mitral valve structure was thickened with reduced coaptation and   normal leaflet separation. DOPPLER: The transmitral velocity was within   the normal range with no evidence for mitral stenosis. There was moderate   mitral regurgitation. Mild tricuspid regurgitation.       Signature      ----------------------------------------------------------------   Electronically signed by Marcelina Rahman MD (Interpreting   physician) on 09/15/2021 at 04:58 PM   ----------------------------------------------------------------        Assessment    Acute on chronic Diastolic CHF exacerbation  Leg edema  B/l +2  COPD stable  CAD  s/p pacemaker placement 6/15/21 and stent to LAD and circumflex. [facility] 6/2021 with 30% stenosis of proximal LAD and patent stent, luminal irregularities in left main and RCA without obstruction. LCx without significant obstructions. Continue ASA/Statin. HTN  HLP  DM  GERD  PTSD  S/p Pacemaker Lexus 15, 2021 and recent interrogation Jan 2022 WNL        Plan   The current meds and labs reviewed    Continue with gentle IV diuresis  BMP at am and adjust diuresis accordingly  Once well diuresed will resume torsemide po  Make sure F/u with CHF clinic 1 week post D/c  Cont adlactone    Continue  lipitor    Advised to resume asa and apt agreed  No hx of bleeding    D/w the pat the plan of care    Based on the course and the result of the above test we will gauge further care    Thank you for allowing me to participate in the care of your patient.  Please don't hesitate to contact me regarding any further issues related to the patient care      Herman Larson MD

## 2022-02-02 NOTE — PROGRESS NOTES
Pt admitted to  5K28 via via cart/stretcher from ED. Complaints: Bilateral leg swelling  IV site free of s/s of infection or infiltration. Vital signs obtained. Assessment and data collection initiated. Two nurse skin assessment performed by Jody Stratton RN and Pawel Mathews RN. Oriented to room. Policies and procedures for 5 explained. All questions answered with no further questions at this time. Fall prevention and safety brochure discussed with patient. Bed alarm on. Call light in reach. Oriented to room. Jose Alejandro Ken, RN, RN 2/2/2022 12:07 AM     Explained patients right to have family, representative or physician notified of their admission. Patient has Declined for physician to be notified. Patient has Declined for family/representative to be notified.

## 2022-02-02 NOTE — ED NOTES
Patient updated with plan of care. No longer tearful. Patient laughing and joking with staff after being admitted. Patient medicated per order and requesting Purewick catheter. Call light in reach. Lights dimmed per request. Awaiting inpatient room assignment.       Spike Garrison RN  02/01/22 2112

## 2022-02-03 LAB
ANION GAP SERPL CALCULATED.3IONS-SCNC: 12 MEQ/L (ref 8–16)
AVERAGE GLUCOSE: 144 MG/DL (ref 70–126)
BUN BLDV-MCNC: 21 MG/DL (ref 7–22)
CALCIUM SERPL-MCNC: 9.1 MG/DL (ref 8.5–10.5)
CHLORIDE BLD-SCNC: 97 MEQ/L (ref 98–111)
CO2: 31 MEQ/L (ref 23–33)
CREAT SERPL-MCNC: 1.1 MG/DL (ref 0.4–1.2)
ERYTHROCYTE [DISTWIDTH] IN BLOOD BY AUTOMATED COUNT: 13.1 % (ref 11.5–14.5)
ERYTHROCYTE [DISTWIDTH] IN BLOOD BY AUTOMATED COUNT: 46.3 FL (ref 35–45)
GFR SERPL CREATININE-BSD FRML MDRD: 50 ML/MIN/1.73M2
GLUCOSE BLD-MCNC: 137 MG/DL (ref 70–108)
GLUCOSE BLD-MCNC: 152 MG/DL (ref 70–108)
GLUCOSE BLD-MCNC: 183 MG/DL (ref 70–108)
GLUCOSE BLD-MCNC: 193 MG/DL (ref 70–108)
HBA1C MFR BLD: 6.8 % (ref 4.4–6.4)
HCT VFR BLD CALC: 33.1 % (ref 37–47)
HEMOGLOBIN: 10.5 GM/DL (ref 12–16)
MCH RBC QN AUTO: 30.6 PG (ref 26–33)
MCHC RBC AUTO-ENTMCNC: 31.7 GM/DL (ref 32.2–35.5)
MCV RBC AUTO: 96.5 FL (ref 81–99)
PLATELET # BLD: 253 THOU/MM3 (ref 130–400)
PMV BLD AUTO: 8.9 FL (ref 9.4–12.4)
POTASSIUM SERPL-SCNC: 3.8 MEQ/L (ref 3.5–5.2)
RBC # BLD: 3.43 MILL/MM3 (ref 4.2–5.4)
SODIUM BLD-SCNC: 140 MEQ/L (ref 135–145)
WBC # BLD: 5.2 THOU/MM3 (ref 4.8–10.8)

## 2022-02-03 PROCEDURE — 2580000003 HC RX 258: Performed by: HOSPITALIST

## 2022-02-03 PROCEDURE — G0378 HOSPITAL OBSERVATION PER HR: HCPCS

## 2022-02-03 PROCEDURE — 96372 THER/PROPH/DIAG INJ SC/IM: CPT

## 2022-02-03 PROCEDURE — 80048 BASIC METABOLIC PNL TOTAL CA: CPT

## 2022-02-03 PROCEDURE — 96376 TX/PRO/DX INJ SAME DRUG ADON: CPT

## 2022-02-03 PROCEDURE — 99226 PR SBSQ OBSERVATION CARE/DAY 35 MINUTES: CPT | Performed by: PHYSICIAN ASSISTANT

## 2022-02-03 PROCEDURE — 36415 COLL VENOUS BLD VENIPUNCTURE: CPT

## 2022-02-03 PROCEDURE — 6370000000 HC RX 637 (ALT 250 FOR IP): Performed by: HOSPITALIST

## 2022-02-03 PROCEDURE — 83036 HEMOGLOBIN GLYCOSYLATED A1C: CPT

## 2022-02-03 PROCEDURE — 6370000000 HC RX 637 (ALT 250 FOR IP): Performed by: INTERNAL MEDICINE

## 2022-02-03 PROCEDURE — 6360000002 HC RX W HCPCS: Performed by: HOSPITALIST

## 2022-02-03 PROCEDURE — 6370000000 HC RX 637 (ALT 250 FOR IP): Performed by: PHYSICIAN ASSISTANT

## 2022-02-03 PROCEDURE — 82948 REAGENT STRIP/BLOOD GLUCOSE: CPT

## 2022-02-03 PROCEDURE — 85027 COMPLETE CBC AUTOMATED: CPT

## 2022-02-03 RX ORDER — HYDROXYZINE HYDROCHLORIDE 10 MG/1
10 TABLET, FILM COATED ORAL 3 TIMES DAILY PRN
Status: DISCONTINUED | OUTPATIENT
Start: 2022-02-03 | End: 2022-02-06 | Stop reason: HOSPADM

## 2022-02-03 RX ADMIN — GABAPENTIN 800 MG: 400 CAPSULE ORAL at 12:00

## 2022-02-03 RX ADMIN — PANTOPRAZOLE SODIUM 40 MG: 40 TABLET, DELAYED RELEASE ORAL at 06:00

## 2022-02-03 RX ADMIN — FLUOXETINE 20 MG: 20 CAPSULE ORAL at 12:05

## 2022-02-03 RX ADMIN — SODIUM CHLORIDE, PRESERVATIVE FREE 10 ML: 5 INJECTION INTRAVENOUS at 11:57

## 2022-02-03 RX ADMIN — FERROUS SULFATE TAB 325 MG (65 MG ELEMENTAL FE) 325 MG: 325 (65 FE) TAB at 12:00

## 2022-02-03 RX ADMIN — ASPIRIN 81 MG: 81 TABLET, COATED ORAL at 20:30

## 2022-02-03 RX ADMIN — FERROUS SULFATE TAB 325 MG (65 MG ELEMENTAL FE) 325 MG: 325 (65 FE) TAB at 20:30

## 2022-02-03 RX ADMIN — INSULIN LISPRO 2 UNITS: 100 INJECTION, SOLUTION INTRAVENOUS; SUBCUTANEOUS at 18:06

## 2022-02-03 RX ADMIN — ENOXAPARIN SODIUM 40 MG: 100 INJECTION SUBCUTANEOUS at 11:56

## 2022-02-03 RX ADMIN — HYDROXYZINE HYDROCHLORIDE 10 MG: 10 TABLET ORAL at 20:30

## 2022-02-03 RX ADMIN — SODIUM CHLORIDE, PRESERVATIVE FREE 10 ML: 5 INJECTION INTRAVENOUS at 20:30

## 2022-02-03 RX ADMIN — GABAPENTIN 800 MG: 400 CAPSULE ORAL at 20:30

## 2022-02-03 RX ADMIN — INSULIN LISPRO 1 UNITS: 100 INJECTION, SOLUTION INTRAVENOUS; SUBCUTANEOUS at 22:12

## 2022-02-03 RX ADMIN — TRAZODONE HYDROCHLORIDE 100 MG: 100 TABLET ORAL at 20:30

## 2022-02-03 RX ADMIN — FUROSEMIDE 40 MG: 10 INJECTION, SOLUTION INTRAMUSCULAR; INTRAVENOUS at 11:56

## 2022-02-03 RX ADMIN — INSULIN LISPRO 2 UNITS: 100 INJECTION, SOLUTION INTRAVENOUS; SUBCUTANEOUS at 12:43

## 2022-02-03 RX ADMIN — MEMANTINE HYDROCHLORIDE 10 MG: 10 TABLET ORAL at 12:00

## 2022-02-03 RX ADMIN — LEVOTHYROXINE SODIUM 25 MCG: 25 TABLET ORAL at 06:09

## 2022-02-03 RX ADMIN — FOLIC ACID 1 MG: 1 TABLET ORAL at 12:05

## 2022-02-03 RX ADMIN — FUROSEMIDE 40 MG: 10 INJECTION, SOLUTION INTRAMUSCULAR; INTRAVENOUS at 18:06

## 2022-02-03 RX ADMIN — SPIRONOLACTONE 25 MG: 25 TABLET ORAL at 12:05

## 2022-02-03 RX ADMIN — ATORVASTATIN CALCIUM 40 MG: 40 TABLET, FILM COATED ORAL at 20:30

## 2022-02-03 RX ADMIN — MEMANTINE HYDROCHLORIDE 10 MG: 10 TABLET ORAL at 20:30

## 2022-02-03 RX ADMIN — HYDROXYZINE HYDROCHLORIDE 10 MG: 10 TABLET ORAL at 11:56

## 2022-02-03 ASSESSMENT — PAIN SCALES - GENERAL
PAINLEVEL_OUTOF10: 0

## 2022-02-03 NOTE — PROGRESS NOTES
Abraham Lauren 59  OCCUPATIONAL THERAPY MISSED TREATMENT NOTE  [facility] 5K  5K-28/028-A      Date: 2/3/2022  Patient Name: Junior Willams        CSN: [id number]   : 1960  (57 y.o.)  Gender: female   Referring Practitioner: Hira Lynch DO  Diagnosis: Fluid overload         REASON FOR MISSED TREATMENT: OT attempted X2 this date. At 11, pt with tech/nurse in room completing care. Checked at 80 with RN reporting pt is refusing therapy due to awaiting news from her daughter's surgery that is occurring at this time.  Will check back as able

## 2022-02-03 NOTE — FLOWSHEET NOTE
Pt, a 57year old female was dealing with fluid overload. She asked for a  for prayer and support as her daughter will be having breast cancer surgery this afternoon in [place]. Pt was so anxious and fearful. She said that she had already lost her two sons and her . She was scared of loosing another child. She talked a length about her family and [employer]. She seemed to be very religous but wanted someone to pray with her and boast her confidence. I listened attentively to her concerns and offered words of consolation and encouragement and provided a prayerful support. She was elated. 02/03/22 1229   Encounter Summary   Services provided to: Patient   Referral/Consult From: [facility] Family members   Continue Visiting Yes  (2/3)   Complexity of Encounter Moderate   Length of Encounter 45 minutes   Spiritual/Hinduism   Type Spiritual support   Assessment Approachable;Tearful; Anxious; Fearful   Intervention Prayer;Nurtured hope; Active listening;Empowerment;Sustaining presence/ Ministry of presence   Outcome Connection/belonging;Expressed gratitude;Encouraged; Hopeful;Receptive

## 2022-02-03 NOTE — PROGRESS NOTES
This RN entered room to get vitals, morning assessment, and medications passed and patient refused stating \"my daughter is having surgery right now can we wait to do all of this? \" RN stated she will be back in about an hour to reassess.

## 2022-02-03 NOTE — PROGRESS NOTES
Hospitalist Progress Note      Patient:  Gerber Fleischer    Unit/Bed:5K-28/028-A  YOB: 1960  MRN: [id number]   Acct: [de-identified]   PCP: Franko Mahoney MD  Date of Admission: 2/1/2022    Assessment/Plan:    1. B/L LE edema / fluid overload: Per patient she has had multiple adjustments lately to her diuretics. Established with Dr. Susana Oswald in cardiology. Cardiology has been consulted. Continue with Lasix 40 mg IV twice daily. Strict I/os. Daily weights. Per cardiology, once diuresing well we will continue torsemide. Continue Aldactone. BMP in a.m. to monitor kidney function  2/3: pt - ~3L. Renal function stable. Continue monitoring. 2. Chronic hypoxic respiratory failure / COPD without acute exacerbation: Patient stable on 2 L NC which she reports is her baseline. No home inhalers listed. 3. CAD: ASA, statin  4. Essential hypertension: Resume home meds, continue to monitor BP  5. NIDDM II with polyneuropathy: Hold home Metformin. SSI. Accu. Hypoglycemia protocol in place. Carb controlled diet, fluid restriction. 6. GERD: PPI  7. Hyperlipidemia: Statin  8. Acquired hypothyroidism: Continue daily home Synthroid  9. PTSD: Continue home medications  10. Physical deconditioning: PT/OT, could benefit from home health. Will consult SW.  11. Anxiety: pt very tearful and anxious, concerned about daughter having surgery today. PRN atarax. Chief Complaint: Leg swelling    Initial H and P:-    \"This is a 57-year-old female with mitral regurgitation, CAD, COPD, dementia, diabetes mellitus type 2, GERD, essential hypertension, hyperlipidemia, PTSD presenting with bilateral lower extremity swelling. Patient states that no matter what she does her leg swelling will not improve. Patient's leg swelling was getting worse and patient is getting pain every time she walks due to the swelling.   Patient states that her diuretics were rearranged multiple times recently. Patient is supposed to be on fluid restriction but patient admits that she was not compliant with the fluid restriction. Patient was reluctant to be discharged to home. ER requested observation admission. Patient has no other acute medical issue at this time. Patient is urinating well with IV Lasix dose given in ER. \"    2/2: pt doing well, laughing and joking lying in bed. Reports good urine output and no dysuria. Denies CP/SOB. States her swelling has improved some. Subjective (past 24 hours):   2/3: pt is very talkative today, states she doesn't want to see anyone except her providers, nurses, and lunch people. First issue she brings up is that she will not follow a carb control diet and that she refuses to get into the chair. Pt keeps crying. Denies CP/SOB. Still feels like she can't move well and has pain in her legs from swelling. Past medical history, family history, social history and allergies reviewed again and is unchanged since admission. ROS (All review of systems completed. Pertinent positives noted.  Otherwise All other systems reviewed and negative.)     Medications:  Reviewed    Infusion Medications    sodium chloride       Scheduled Medications    aspirin  81 mg Oral Daily    atorvastatin  40 mg Oral Daily    FLUoxetine  20 mg Oral Daily    ferrous sulfate  325 mg Oral BID    folic acid  1 mg Oral Daily    gabapentin  800 mg Oral BID    levothyroxine  25 mcg Oral Daily    memantine  10 mg Oral BID    pantoprazole  40 mg Oral QAM AC    spironolactone  25 mg Oral Daily    traZODone  100 mg Oral Nightly    insulin lispro  0-12 Units SubCUTAneous TID WC    insulin lispro  0-6 Units SubCUTAneous Nightly    furosemide  40 mg IntraVENous BID    sodium chloride flush  10 mL IntraVENous 2 times per day    enoxaparin  40 mg SubCUTAneous Daily     PRN Meds: melatonin, sodium chloride flush, sodium chloride, polyethylene glycol, acetaminophen **OR** acetaminophen, ondansetron      Intake/Output Summary (Last 24 hours) at 2/3/2022 1023  Last data filed at 2/3/2022 0606  Gross per 24 hour   Intake 540 ml   Output 2900 ml   Net -2360 ml       Diet:  ADULT DIET; Regular; Low Fat/Low Chol/High Fiber/VIKTORIYA; Low Sodium (2 gm); 1500 ml    Exam:  BP (!) 141/67   Pulse 68   Temp 97.7 °F (36.5 °C) (Oral)   Resp 16   Ht 5' 1\" (1.549 m)   Wt 231 lb 4.8 oz (104.9 kg)   SpO2 97%   BMI 43.70 kg/m²   General appearance: Severely obese, tearful, no apparent distress, appears stated age and cooperative. HEENT: Pupils equal, round, and reactive to light. Conjunctivae/corneas clear. Neck: Supple, with full range of motion. No jugular venous distention. Trachea midline. Respiratory:  Normal respiratory effort. Clear to auscultation, bilaterally without Rales/Wheezes/Rhonchi. Cardiovascular: Regular rate and rhythm with normal S1/S2 without murmurs, rubs or gallops. Abdomen: Soft, non-tender, non-distended with normal bowel sounds. Musculoskeletal: passive and active ROM x 4 extremities. +3 edema in bilateral lower extremities  Skin: Skin color, texture, turgor normal.  No rashes or lesions. Neurologic:  Neurovascularly intact without any focal sensory/motor deficits.  Cranial nerves: II-XII intact, grossly non-focal.  Psychiatric: Alert and oriented x4, thought content appropriate, normal insight  Capillary Refill: Brisk,< 3 seconds   Peripheral Pulses: +2 palpable, equal bilaterally     Labs:   Recent Labs     02/01/22  1815 02/02/22  0356 02/03/22  0349   WBC 5.4 4.8 5.2   HGB 10.4* 9.8* 10.5*   HCT 33.3* 31.7* 33.1*    227 253     Recent Labs     02/01/22  1815 02/02/22  0356 02/03/22  0629    142 140   K 4.4 4.1 3.8   CL 99 101 97*   CO2 27 28 31   BUN 21 21 21   CREATININE 1.2 1.1 1.1   CALCIUM 9.3 9.3 9.1     Recent Labs     02/01/22  1815 02/02/22  0356   AST 18 14   ALT 13 12   BILIDIR <0.2  --    BILITOT 0.3 0.3   ALKPHOS 84 78     No results for input(s): INR in the last 72 hours. No results for input(s): Franny Archibald in the last 72 hours. Microbiology:    Blood culture #1: No results found for: BC    Blood culture #2:No results found for: Ermias Diaz    Organism:No results found for: ORG    No results found for: LABGRAM    MRSA culture only:No results found for: [facility]    Urine culture:   Lab Results   Component Value Date    LABURIN SEE BELOW 12/27/2017       Respiratory culture: No results found for: CULTRESP    Aerobic and Anaerobic :  No results found for: LABAERO  No results found for: LABANAE    Urinalysis:      Lab Results   Component Value Date    NITRU NEGATIVE 02/01/2022    WBCUA 3-5 12/27/2017    BACTERIA 2+ 12/27/2017    RBCUA 0-2 12/27/2017    BLOODU NEGATIVE 02/01/2022    SPECGRAV 1.025 12/27/2017    GLUCOSEU NEGATIVE 02/01/2022       Radiology:  XR CHEST (2 VW)   Final Result   1. Moderate hepatomegaly. Permanent dual-chamber pacemaker. 2. No acute findings. No infiltrates or effusions are seen. **This report has been created using voice recognition software. It may contain minor errors which are inherent in voice recognition technology. **      Final report electronically signed by Dr. Sharad Kelsey on 2/1/2022 6:59 PM        XR CHEST (2 VW)    Result Date: 2/1/2022  PROCEDURE: XR CHEST (2 VW) CLINICAL INFORMATION: SOB COMPARISON: 6/16/2021 TECHNIQUE: AP upright and lateral views of the chest were obtained. 1. Moderate hepatomegaly. Permanent dual-chamber pacemaker. 2. No acute findings. No infiltrates or effusions are seen. **This report has been created using voice recognition software. It may contain minor errors which are inherent in voice recognition technology. ** Final report electronically signed by Dr. Sharad Kelsey on 2/1/2022 6:59 PM      Electronically signed by Jeovany Brito PA-C on 2/3/2022 at 10:23 AM

## 2022-02-04 LAB
ANION GAP SERPL CALCULATED.3IONS-SCNC: 14 MEQ/L (ref 8–16)
BUN BLDV-MCNC: 30 MG/DL (ref 7–22)
CALCIUM SERPL-MCNC: 9.8 MG/DL (ref 8.5–10.5)
CHLORIDE BLD-SCNC: 97 MEQ/L (ref 98–111)
CO2: 29 MEQ/L (ref 23–33)
CREAT SERPL-MCNC: 1.1 MG/DL (ref 0.4–1.2)
CREAT SERPL-MCNC: 1.3 MG/DL (ref 0.4–1.2)
ERYTHROCYTE [DISTWIDTH] IN BLOOD BY AUTOMATED COUNT: 13.1 % (ref 11.5–14.5)
ERYTHROCYTE [DISTWIDTH] IN BLOOD BY AUTOMATED COUNT: 46.7 FL (ref 35–45)
GFR SERPL CREATININE-BSD FRML MDRD: 41 ML/MIN/1.73M2
GFR SERPL CREATININE-BSD FRML MDRD: 50 ML/MIN/1.73M2
GLUCOSE BLD-MCNC: 146 MG/DL (ref 70–108)
GLUCOSE BLD-MCNC: 159 MG/DL (ref 70–108)
GLUCOSE BLD-MCNC: 172 MG/DL (ref 70–108)
GLUCOSE BLD-MCNC: 182 MG/DL (ref 70–108)
HCT VFR BLD CALC: 35.1 % (ref 37–47)
HEMOGLOBIN: 10.9 GM/DL (ref 12–16)
MCH RBC QN AUTO: 30.1 PG (ref 26–33)
MCHC RBC AUTO-ENTMCNC: 31.1 GM/DL (ref 32.2–35.5)
MCV RBC AUTO: 97 FL (ref 81–99)
PLATELET # BLD: 279 THOU/MM3 (ref 130–400)
PMV BLD AUTO: 8.9 FL (ref 9.4–12.4)
POTASSIUM SERPL-SCNC: 4.1 MEQ/L (ref 3.5–5.2)
RBC # BLD: 3.62 MILL/MM3 (ref 4.2–5.4)
SODIUM BLD-SCNC: 140 MEQ/L (ref 135–145)
WBC # BLD: 6.1 THOU/MM3 (ref 4.8–10.8)

## 2022-02-04 PROCEDURE — 2580000003 HC RX 258: Performed by: HOSPITALIST

## 2022-02-04 PROCEDURE — 85027 COMPLETE CBC AUTOMATED: CPT

## 2022-02-04 PROCEDURE — 6370000000 HC RX 637 (ALT 250 FOR IP): Performed by: HOSPITALIST

## 2022-02-04 PROCEDURE — 6360000002 HC RX W HCPCS: Performed by: HOSPITALIST

## 2022-02-04 PROCEDURE — 97116 GAIT TRAINING THERAPY: CPT

## 2022-02-04 PROCEDURE — 80048 BASIC METABOLIC PNL TOTAL CA: CPT

## 2022-02-04 PROCEDURE — 96376 TX/PRO/DX INJ SAME DRUG ADON: CPT

## 2022-02-04 PROCEDURE — 82565 ASSAY OF CREATININE: CPT

## 2022-02-04 PROCEDURE — G0378 HOSPITAL OBSERVATION PER HR: HCPCS

## 2022-02-04 PROCEDURE — 96372 THER/PROPH/DIAG INJ SC/IM: CPT

## 2022-02-04 PROCEDURE — 97530 THERAPEUTIC ACTIVITIES: CPT

## 2022-02-04 PROCEDURE — 36415 COLL VENOUS BLD VENIPUNCTURE: CPT

## 2022-02-04 PROCEDURE — 6370000000 HC RX 637 (ALT 250 FOR IP): Performed by: INTERNAL MEDICINE

## 2022-02-04 PROCEDURE — 82948 REAGENT STRIP/BLOOD GLUCOSE: CPT

## 2022-02-04 PROCEDURE — 99226 PR SBSQ OBSERVATION CARE/DAY 35 MINUTES: CPT | Performed by: PHYSICIAN ASSISTANT

## 2022-02-04 PROCEDURE — 6370000000 HC RX 637 (ALT 250 FOR IP): Performed by: PHYSICIAN ASSISTANT

## 2022-02-04 RX ORDER — TORSEMIDE 20 MG/1
20 TABLET ORAL DAILY
Status: DISCONTINUED | OUTPATIENT
Start: 2022-02-04 | End: 2022-02-06 | Stop reason: HOSPADM

## 2022-02-04 RX ADMIN — SODIUM CHLORIDE, PRESERVATIVE FREE 10 ML: 5 INJECTION INTRAVENOUS at 20:49

## 2022-02-04 RX ADMIN — INSULIN LISPRO 3 UNITS: 100 INJECTION, SOLUTION INTRAVENOUS; SUBCUTANEOUS at 20:42

## 2022-02-04 RX ADMIN — LEVOTHYROXINE SODIUM 25 MCG: 25 TABLET ORAL at 06:28

## 2022-02-04 RX ADMIN — TORSEMIDE 20 MG: 20 TABLET ORAL at 14:25

## 2022-02-04 RX ADMIN — MEMANTINE HYDROCHLORIDE 10 MG: 10 TABLET ORAL at 09:11

## 2022-02-04 RX ADMIN — INSULIN LISPRO 2 UNITS: 100 INJECTION, SOLUTION INTRAVENOUS; SUBCUTANEOUS at 12:43

## 2022-02-04 RX ADMIN — HYDROXYZINE HYDROCHLORIDE 10 MG: 10 TABLET ORAL at 20:49

## 2022-02-04 RX ADMIN — INSULIN LISPRO 2 UNITS: 100 INJECTION, SOLUTION INTRAVENOUS; SUBCUTANEOUS at 18:18

## 2022-02-04 RX ADMIN — ASPIRIN 81 MG: 81 TABLET, COATED ORAL at 20:46

## 2022-02-04 RX ADMIN — GABAPENTIN 800 MG: 400 CAPSULE ORAL at 09:11

## 2022-02-04 RX ADMIN — FLUOXETINE 20 MG: 20 CAPSULE ORAL at 09:12

## 2022-02-04 RX ADMIN — SODIUM CHLORIDE, PRESERVATIVE FREE 10 ML: 5 INJECTION INTRAVENOUS at 09:12

## 2022-02-04 RX ADMIN — ATORVASTATIN CALCIUM 40 MG: 40 TABLET, FILM COATED ORAL at 20:47

## 2022-02-04 RX ADMIN — TRAZODONE HYDROCHLORIDE 100 MG: 100 TABLET ORAL at 20:46

## 2022-02-04 RX ADMIN — PANTOPRAZOLE SODIUM 40 MG: 40 TABLET, DELAYED RELEASE ORAL at 06:29

## 2022-02-04 RX ADMIN — FUROSEMIDE 40 MG: 10 INJECTION, SOLUTION INTRAMUSCULAR; INTRAVENOUS at 09:09

## 2022-02-04 RX ADMIN — GABAPENTIN 800 MG: 400 CAPSULE ORAL at 20:46

## 2022-02-04 RX ADMIN — FERROUS SULFATE TAB 325 MG (65 MG ELEMENTAL FE) 325 MG: 325 (65 FE) TAB at 09:11

## 2022-02-04 RX ADMIN — SPIRONOLACTONE 25 MG: 25 TABLET ORAL at 09:12

## 2022-02-04 RX ADMIN — MEMANTINE HYDROCHLORIDE 10 MG: 10 TABLET ORAL at 20:46

## 2022-02-04 RX ADMIN — Medication 4.5 MG: at 20:47

## 2022-02-04 RX ADMIN — ENOXAPARIN SODIUM 40 MG: 100 INJECTION SUBCUTANEOUS at 09:09

## 2022-02-04 RX ADMIN — HYDROXYZINE HYDROCHLORIDE 10 MG: 10 TABLET ORAL at 09:41

## 2022-02-04 RX ADMIN — FOLIC ACID 1 MG: 1 TABLET ORAL at 09:12

## 2022-02-04 RX ADMIN — FERROUS SULFATE TAB 325 MG (65 MG ELEMENTAL FE) 325 MG: 325 (65 FE) TAB at 20:48

## 2022-02-04 RX ADMIN — INSULIN LISPRO 2 UNITS: 100 INJECTION, SOLUTION INTRAVENOUS; SUBCUTANEOUS at 09:09

## 2022-02-04 ASSESSMENT — PAIN SCALES - GENERAL
PAINLEVEL_OUTOF10: 0

## 2022-02-04 NOTE — PROGRESS NOTES
[facility]  INPATIENT PHYSICAL THERAPY  DAILY NOTE  STR ONC MED 5K - 5K-28/028-A    Time In: 1203  Time Out: 6938  Timed Code Treatment Minutes: 25 Minutes  Minutes: 25          Date: 2022  Patient Name: Sylwia Garcia,  Gender:  female        MRN: [id number]  : 1960  (57 y.o.)     Referring Practitioner: Emilia Parsons DO  Diagnosis: anxiety state  Additional Pertinent Hx: Per EMR \"This is a 50-year-old female with mitral regurgitation, CAD, COPD, dementia, diabetes mellitus type 2, GERD, essential hypertension, hyperlipidemia, PTSD presenting with bilateral lower extremity swelling. Patient states that no matter what she does her leg swelling will not improve. Patient's leg swelling was getting worse and patient is getting pain every time she walks due to the swelling. Patient states that her diuretics were rearranged multiple times recently. Patient is supposed to be on fluid restriction but patient admits that she was not compliant with the fluid restriction. Patient was reluctant to be discharged to home. ER requested observation admission. Patient has no other acute medical issue at this time. Patient is urinating well with IV Lasix dose given in ER. \"     Prior Level of Function:  Lives With: Alone  Type of Home: Apartment (2nd level but utilizes elevator)  Home Access: Elevator  Home Equipment: Oxygen,4 wheeled walker   Bathroom Shower/Tub:  (Tub shower combo with a cut out to be able to walk in/out of)  Bathroom Toilet: Standard  Bathroom Equipment: Commode  Bathroom Accessibility: Accessible    Receives Help From: Friend(s) (Pt reports she has asstistance for grocery hopping)  ADL Assistance: Independent  Homemaking Assistance: Independent  Ambulation Assistance: Independent  Transfer Assistance: Independent  Active : No (has not drove in ~10 years, utilizes [employer].)  Additional Comments: Pt. reports she used to have a , but for the last year she has lived alone in apartment complex. Pt amb with 4ww, IND with all tasks with rest breaks required. Restrictions/Precautions:  Restrictions/Precautions: General Precautions,Fall Risk  Position Activity Restriction  Other position/activity restrictions: Fluid restriction     SUBJECTIVE: RN approved session, pt is supine in bed, talkative, agreeable to PT. PAIN: sore legs    Vitals: Oxygen: 93% after ambulation on 1 L O2    OBJECTIVE:  Bed Mobility:  Supine to Sit: Stand By Assistance  Sit to Supine: Stand By Assistance     Transfers:  Sit to Stand: Stand By Assistance  Stand to Sit:Stand By Assistance    Ambulation:  Stand By Assistance  Distance: 90 feet around room  Surface: Level Tile  Device:Rolling Walker  Gait Deviations:  Slow Regine, Decreased Step Length Bilaterally and Decreased Gait Speed  **Pt talkative, multiple stops during ambulation, steady    Functional Outcome Measures: Not completed     ASSESSMENT:  Assessment: Patient progressing toward established goals. Activity Tolerance:  Patient tolerance of  treatment: good. Equipment Recommendations:Equipment Needed: No  Discharge Recommendations: Home with Assist as Needed  Plan: Times per week: 3-5x GM  Current Treatment Recommendations: Strengthening,Home Exercise Program,Neuromuscular Re-education,Safety Education & [place] Training,Patient/Caregiver Education & Training,Functional Mobility Training,Equipment Evaluation, Education, & procurement,Transfer Training,Gait Training,Positioning    Patient Education  Patient Education: Transfers, Gait    Goals:  Patient goals : go home  Short term goals  Time Frame for Short term goals: by discharge  Short term goal 1: Pt will amb with RW with IND for >50 feet to complete HH distances. Short term goal 2: Pt will demo IND with transfers with RW for support to progress towards PLOF.   Short term goal 3: Pt will demo IND with bed mobility tasks with good technique to return home safely. Long term goals  Time Frame for Long term goals : NA due to short ELOS    Following session, patient left in safe position with all fall risk precautions in place.

## 2022-02-04 NOTE — PROGRESS NOTES
Hospitalist Progress Note      Patient:  Adelina Reich    Unit/Bed:5K-28/028-A  YOB: 1960  MRN: [id number]   Acct: [de-identified]   PCP: Sherron Dawson MD  Date of Admission: 2/1/2022    Assessment/Plan:    1. RIVAS: likely secondary to diuretic use. Stop IV Lasix today. Resume torsemide home dose and hold, hold spironolactone. Given #2, will hold off on IVF. Repeat creatinine later on this evening. Avoid further nephrotoxic agents. Repeat BMP in a.m.  2. B/L LE edema / fluid overload, improved: Per patient she has had multiple adjustments lately to her diuretics. Established with Dr. Noah Cano in cardiology. Cardiology has been consulted. Continue with Lasix 40 mg IV twice daily. Strict I/os. Daily weights. Per cardiology, once diuresing well we will continue torsemide. Continue Aldactone. BMP in a.m. to monitor kidney function  2/3: pt - ~3L. Renal function stable. Continue monitoring. 2/4: Patient had approximately 6 L. Wants to go home, however now with RIVAS. 3. Chronic hypoxic respiratory failure / COPD without acute exacerbation: Patient stable on 2 L NC which she reports is her baseline. No home inhalers listed. 4. CAD: ASA, statin  5. Essential hypertension: Resume home meds, continue to monitor BP  6. NIDDM II with polyneuropathy: Hold home Metformin. SSI. Accu. Hypoglycemia protocol in place. Carb controlled diet, fluid restriction. 7. GERD: PPI  8. Hyperlipidemia: Statin  9. Acquired hypothyroidism: Continue daily home Synthroid  10. PTSD: Continue home medications  11. Physical deconditioning: PT/OT, could benefit from home health. Will consult SW.  12. Anxiety: Patient reports good response to PRN atarax.      Chief Complaint: Leg swelling    Initial H and P:-    \"This is a 76-year-old female with mitral regurgitation, CAD, COPD, dementia, diabetes mellitus type 2, GERD, essential hypertension, hyperlipidemia, PTSD presenting with bilateral lower extremity swelling. Patient states that no matter what she does her leg swelling will not improve. Patient's leg swelling was getting worse and patient is getting pain every time she walks due to the swelling. Patient states that her diuretics were rearranged multiple times recently. Patient is supposed to be on fluid restriction but patient admits that she was not compliant with the fluid restriction. Patient was reluctant to be discharged to home. ER requested observation admission. Patient has no other acute medical issue at this time. Patient is urinating well with IV Lasix dose given in ER. \"    2/2: pt doing well, laughing and joking lying in bed. Reports good urine output and no dysuria. Denies CP/SOB. States her swelling has improved some. 2/3: pt is very talkative today, states she doesn't want to see anyone except her providers, nurses, and lunch people. First issue she brings up is that she will not follow a carb control diet and that she refuses to get into the chair. Pt keeps crying. Denies CP/SOB. Still feels like she can't move well and has pain in her legs from swelling. Subjective (past 24 hours):   2/4: Patient again very talkative today, very interested in talking about her anxiety medications. Discussed with patient that she would likely benefit from outpatient psychiatric follow-up. Patient reported that she will not go home until she is able to take p.o. medication to see \"if it works \". Stopped patient's IV diuretics today. Patient denies chest pain or shortness of breath. Past medical history, family history, social history and allergies reviewed again and is unchanged since admission. ROS (All review of systems completed. Pertinent positives noted.  Otherwise All other systems reviewed and negative.)     Medications:  Reviewed    Infusion Medications    sodium chloride       Scheduled Medications    [Held by provider] torsemide  20 mg Oral Daily    aspirin  81 mg Oral Daily    atorvastatin  40 mg Oral Daily    FLUoxetine  20 mg Oral Daily    ferrous sulfate  325 mg Oral BID    folic acid  1 mg Oral Daily    gabapentin  800 mg Oral BID    levothyroxine  25 mcg Oral Daily    memantine  10 mg Oral BID    pantoprazole  40 mg Oral QAM AC    [Held by provider] spironolactone  25 mg Oral Daily    traZODone  100 mg Oral Nightly    insulin lispro  0-12 Units SubCUTAneous TID WC    insulin lispro  0-6 Units SubCUTAneous Nightly    sodium chloride flush  10 mL IntraVENous 2 times per day    enoxaparin  40 mg SubCUTAneous Daily     PRN Meds: hydrOXYzine, melatonin, sodium chloride flush, sodium chloride, polyethylene glycol, acetaminophen **OR** acetaminophen, ondansetron      Intake/Output Summary (Last 24 hours) at 2/4/2022 1627  Last data filed at 2/4/2022 1426  Gross per 24 hour   Intake 720 ml   Output 2800 ml   Net -2080 ml       Diet:  ADULT DIET; Regular; Low Fat/Low Chol/High Fiber/VIKTORIYA; Low Sodium (2 gm); 1500 ml    Exam:  /67   Pulse 73   Temp 98.4 °F (36.9 °C) (Oral)   Resp 18   Ht 5' 1\" (1.549 m)   Wt 231 lb 4.8 oz (104.9 kg)   SpO2 98%   BMI 43.70 kg/m²   General appearance: Severely obese, tearful, no apparent distress, appears stated age and cooperative. HEENT: Pupils equal, round, and reactive to light. Conjunctivae/corneas clear. Neck: Supple, with full range of motion. No jugular venous distention. Trachea midline. Respiratory:  Normal respiratory effort. Clear to auscultation, bilaterally without Rales/Wheezes/Rhonchi. Cardiovascular: Regular rate and rhythm with normal S1/S2 without murmurs, rubs or gallops. Abdomen: Soft, non-tender, non-distended with normal bowel sounds. Musculoskeletal: passive and active ROM x 4 extremities. +2 edema in bilateral lower extremities, improved  Skin: Skin color, texture, turgor normal.  No rashes or lesions.   Neurologic:  Neurovascularly intact without any focal sensory/motor deficits. Cranial nerves: II-XII intact, grossly non-focal.  Psychiatric: Alert and oriented x4, thought content appropriate, normal insight  Capillary Refill: Brisk,< 3 seconds   Peripheral Pulses: +2 palpable, equal bilaterally     Labs:   Recent Labs     02/02/22  0356 02/03/22  0349 02/04/22  0423   WBC 4.8 5.2 6.1   HGB 9.8* 10.5* 10.9*   HCT 31.7* 33.1* 35.1*    253 279     Recent Labs     02/02/22  0356 02/03/22  0629 02/04/22  0423    140 140   K 4.1 3.8 4.1    97* 97*   CO2 28 31 29   BUN 21 21 30*   CREATININE 1.1 1.1 1.3*   CALCIUM 9.3 9.1 9.8     Recent Labs     02/01/22  1815 02/02/22  0356   AST 18 14   ALT 13 12   BILIDIR <0.2  --    BILITOT 0.3 0.3   ALKPHOS 84 78     No results for input(s): INR in the last 72 hours. No results for input(s): Duane Seneca in the last 72 hours. Microbiology:    Blood culture #1: No results found for: BC    Blood culture #2:No results found for: Elva Arthur    Organism:No results found for: ORG    No results found for: LABGRAM    MRSA culture only:No results found for: [facility]    Urine culture:   Lab Results   Component Value Date    LABURIN SEE BELOW 12/27/2017       Respiratory culture: No results found for: CULTRESP    Aerobic and Anaerobic :  No results found for: LABAERO  No results found for: LABANAE    Urinalysis:      Lab Results   Component Value Date    NITRU NEGATIVE 02/01/2022    WBCUA 3-5 12/27/2017    BACTERIA 2+ 12/27/2017    RBCUA 0-2 12/27/2017    BLOODU NEGATIVE 02/01/2022    SPECGRAV 1.025 12/27/2017    GLUCOSEU NEGATIVE 02/01/2022       Radiology:  XR CHEST (2 VW)   Final Result   1. Moderate hepatomegaly. Permanent dual-chamber pacemaker. 2. No acute findings. No infiltrates or effusions are seen. **This report has been created using voice recognition software. It may contain minor errors which are inherent in voice recognition technology. **      Final report electronically signed by Dr. Sagrario Gallardo Leatha on 2/1/2022 6:59 PM        XR CHEST (2 VW)    Result Date: 2/1/2022  PROCEDURE: XR CHEST (2 VW) CLINICAL INFORMATION: SOB COMPARISON: 6/16/2021 TECHNIQUE: AP upright and lateral views of the chest were obtained. 1. Moderate hepatomegaly. Permanent dual-chamber pacemaker. 2. No acute findings. No infiltrates or effusions are seen. **This report has been created using voice recognition software. It may contain minor errors which are inherent in voice recognition technology. ** Final report electronically signed by Dr. Hasmukh Roa on 2/1/2022 6:59 PM      Electronically signed by Sean Gilford, PA-C on 2/4/2022 at 4:27 PM

## 2022-02-04 NOTE — PLAN OF CARE
Consult received-see SW notes for 2/4/22-patient declines home health services. Plans home with [employer].

## 2022-02-04 NOTE — CARE COORDINATION
Discharge planning update:    PT/OT, Cardiology following, creatinine 1.3, Hgb 10.9. Asa, Lovenox, IV Lasix. Plan is to return home at discharge. Will follow for potential needs.    Electronically signed by Suzy Sims RN on 2/4/2022 at 8:52 AM

## 2022-02-04 NOTE — CARE COORDINATION
DISCHARGE/PLANNING EVALUATION  2/4/22, 11:16 AM EST    Reason for Referral: home health  Mental Status: alert and oriented  Decision Making: patient is able to make her own decisions. Family/Social/Home Environment: Spoke with patient re: home situation and discharge needs. She shared that she resides in a 2nd floor apartment at [facility] in [place] [facility] [place]. She shared that she is hoping to be able to move to 1st floor apartment as they are handicapped accessible. There is an elevator in the building. Her bathroom has a walk in tub with shower. She states that there are also 4 grab bars in the tub area. She reports that she is able to get in and out without difficulty and is independent with her personal care. She reports that she does her own meals and housekeeping. She is a [employer] consumer-CM is Concetta Escobedo. She states that she receives transportation services and an ERS. She is considering having home delivered meals added to her plan of care. Current Services including food security, transportation and housekeeping: no issues identified. Current Equipment: 02 through [employer], rolator, bedside commode, \"queen size\" hospital bed, ERS and shower chair. Payment Source: medicare and medicaid  Concerns or Barriers to Discharge: none indicated  Post acute provider list with quality measures, geographic area and applicable managed care information provided. Questions regarding selection process answered: N/A    Teach Back Method used with patient regarding care plan   Patient verbalize understanding of the plan of care and contribute to goal setting. Patient goals, treatment preferences and discharge plan: patient plans home at discharge. Discussed home health referral-patient declines as she is concerned about outside people coming into her home while COVID continues to be an issue. SW discussed benefits of home health and precautions taken by staff-she continues to decline.  SW asked her about support systems-she states that she had a caretaker who resided with her for 3 years. She moved out this past September and now lives across the [place] from patient and continues to check on her and assist with any needs. Patient states that she will need transportation when she is discharged. Call to [address] answer and message left for re: possible discharge either today or tomorrow and that there are no new services being added. 2/4/22, 11:38 AM EST    Patient goals/plan/ treatment preferences discussed by  and . Patient goals/plan/ treatment preferences reviewed with patient/ family. Patient/ family verbalize understanding of discharge plan and are in agreement with goal/plan/treatment preferences. Understanding was demonstrated using the teach back method. AVS provided by RN at time of discharge, which includes all necessary medical information pertaining to the patients current course of illness, treatment, post-discharge goals of care, and treatment preferences.     Services After Discharge  Services At/After Discharge: Shruthi Pike (JOCE)         Electronically signed by MAURICIO Pennington on 2/4/2022 at 11:16 AM

## 2022-02-04 NOTE — CARE COORDINATION
2/4/22, 1:18 PM EST    DISCHARGE PLANNING EVALUATION    Received call from [facility] of admission date, observation state and diagnosis. SW to follow up with her when patient is discharged.

## 2022-02-05 VITALS
SYSTOLIC BLOOD PRESSURE: 131 MMHG | WEIGHT: 231.9 LBS | OXYGEN SATURATION: 100 % | RESPIRATION RATE: 16 BRPM | TEMPERATURE: 97.3 F | HEART RATE: 66 BPM | DIASTOLIC BLOOD PRESSURE: 61 MMHG | HEIGHT: 61 IN | BODY MASS INDEX: 43.78 KG/M2

## 2022-02-05 LAB
ANION GAP SERPL CALCULATED.3IONS-SCNC: 13 MEQ/L (ref 8–16)
BUN BLDV-MCNC: 37 MG/DL (ref 7–22)
CALCIUM SERPL-MCNC: 9.7 MG/DL (ref 8.5–10.5)
CHLORIDE BLD-SCNC: 96 MEQ/L (ref 98–111)
CO2: 30 MEQ/L (ref 23–33)
CREAT SERPL-MCNC: 1.2 MG/DL (ref 0.4–1.2)
GFR SERPL CREATININE-BSD FRML MDRD: 45 ML/MIN/1.73M2
GLUCOSE BLD-MCNC: 138 MG/DL (ref 70–108)
GLUCOSE BLD-MCNC: 183 MG/DL (ref 70–108)
GLUCOSE BLD-MCNC: 189 MG/DL (ref 70–108)
GLUCOSE BLD-MCNC: 190 MG/DL (ref 70–108)
POTASSIUM SERPL-SCNC: 4 MEQ/L (ref 3.5–5.2)
SODIUM BLD-SCNC: 139 MEQ/L (ref 135–145)

## 2022-02-05 PROCEDURE — 6370000000 HC RX 637 (ALT 250 FOR IP): Performed by: PHYSICIAN ASSISTANT

## 2022-02-05 PROCEDURE — G0378 HOSPITAL OBSERVATION PER HR: HCPCS

## 2022-02-05 PROCEDURE — 6370000000 HC RX 637 (ALT 250 FOR IP): Performed by: HOSPITALIST

## 2022-02-05 PROCEDURE — 2580000003 HC RX 258: Performed by: HOSPITALIST

## 2022-02-05 PROCEDURE — 96372 THER/PROPH/DIAG INJ SC/IM: CPT

## 2022-02-05 PROCEDURE — 6360000002 HC RX W HCPCS: Performed by: HOSPITALIST

## 2022-02-05 PROCEDURE — 80048 BASIC METABOLIC PNL TOTAL CA: CPT

## 2022-02-05 PROCEDURE — 82948 REAGENT STRIP/BLOOD GLUCOSE: CPT

## 2022-02-05 PROCEDURE — 36415 COLL VENOUS BLD VENIPUNCTURE: CPT

## 2022-02-05 PROCEDURE — 99217 PR OBSERVATION CARE DISCHARGE MANAGEMENT: CPT | Performed by: PHYSICIAN ASSISTANT

## 2022-02-05 RX ADMIN — FERROUS SULFATE TAB 325 MG (65 MG ELEMENTAL FE) 325 MG: 325 (65 FE) TAB at 08:40

## 2022-02-05 RX ADMIN — HYDROXYZINE HYDROCHLORIDE 10 MG: 10 TABLET ORAL at 08:41

## 2022-02-05 RX ADMIN — GABAPENTIN 800 MG: 400 CAPSULE ORAL at 08:40

## 2022-02-05 RX ADMIN — ENOXAPARIN SODIUM 40 MG: 100 INJECTION SUBCUTANEOUS at 08:37

## 2022-02-05 RX ADMIN — MEMANTINE HYDROCHLORIDE 10 MG: 10 TABLET ORAL at 08:40

## 2022-02-05 RX ADMIN — PANTOPRAZOLE SODIUM 40 MG: 40 TABLET, DELAYED RELEASE ORAL at 08:37

## 2022-02-05 RX ADMIN — INSULIN LISPRO 2 UNITS: 100 INJECTION, SOLUTION INTRAVENOUS; SUBCUTANEOUS at 08:41

## 2022-02-05 RX ADMIN — FOLIC ACID 1 MG: 1 TABLET ORAL at 08:40

## 2022-02-05 RX ADMIN — SODIUM CHLORIDE, PRESERVATIVE FREE 10 ML: 5 INJECTION INTRAVENOUS at 08:37

## 2022-02-05 RX ADMIN — FLUOXETINE 20 MG: 20 CAPSULE ORAL at 08:40

## 2022-02-05 RX ADMIN — INSULIN LISPRO 2 UNITS: 100 INJECTION, SOLUTION INTRAVENOUS; SUBCUTANEOUS at 13:29

## 2022-02-05 RX ADMIN — LEVOTHYROXINE SODIUM 25 MCG: 25 TABLET ORAL at 05:39

## 2022-02-05 RX ADMIN — ACETAMINOPHEN 650 MG: 325 TABLET ORAL at 18:38

## 2022-02-05 ASSESSMENT — PAIN SCALES - GENERAL: PAINLEVEL_OUTOF10: 3

## 2022-02-05 NOTE — CARE COORDINATION
2/5/22, 11:32 AM EST  Unable to arrange transport to home today; patient shared \"try [facility] since [facility] cannot tx me home today /ambulette. \" Kika Jones is also unable to tx pt to home. 1130 am- [facility] to assist with discharge to home today. Patient goals/plan/ treatment preferences discussed by  and . Patient goals/plan/ treatment preferences reviewed with patient/ family. Patient/ family verbalize understanding of discharge plan and are in agreement with goal/plan/treatment preferences. Understanding was demonstrated using the teach back method. AVS provided by RN at time of discharge, which includes all necessary medical information pertaining to the patients current course of illness, treatment, post-discharge goals of care, and treatment preferences.     Services After Discharge  Services At/After Discharge: Chas Stanford ([facility])

## 2022-02-05 NOTE — FLOWSHEET NOTE
02/05/22 1552   Encounter Summary   Services provided to: Patient   Referral/Consult From: Rounding   Continue Visiting Yes  (2/5/22 Emotional distress)   Complexity of Encounter Moderate   Length of Encounter 15 minutes   Routine   Type Initial   Assessment Approachable;Tearful   Intervention Nurtured hope;Prayer   Outcome Comfort   Assessment: In my encounter with the 57 yr old patient, while rounding  the unit 5K,  I provided spiritual care to patient through conversation, I also came to assess the patients spiritual needs present. The pt was admitted due to fluid overload. The pt was emotionally distressed because her daughter in [place] had breast cancer surgery and was afraid because she is still grieving the death of her two sons and . Interventions:  I provided prayer, emotional support and words of comfort.  provided a listening presence and encouraged pt to share their beliefs and how they support him during their hospitalization. Outcomes: The patient was encouraged and didnt share any further spiritual needs at this time. Plan:  Chaplains will follow-up at a later time for assessment of any spiritual care needs present.

## 2022-02-05 NOTE — CARE COORDINATION
2/5/22, 11:22 AM EST    DISCHARGE PLANNING EVALUATION      Spoke with RN, who shares that [id number] transport cannot provide ambulette transport until tomorrow, and patient cannot go by cab, as she needs assist to get into her home. [facility] transport, as requested, but they do not provide ambulette transports on weekends. Planning home by [facility] as planned. Spoke with [id number] transport, they have as will call and will schedule when possible, likely not today, possibly tomorrow.

## 2022-02-05 NOTE — DISCHARGE SUMMARY
Hospitalist Discharge Summary        Patient: Mesfin Caal  YOB: 1960  MRN: [id number]   Acct: [de-identified]    Primary Care Physician: Elisa Cruz MD    Admit date  2/1/2022    Discharge date: 2/5/2022    Chief Complaint on presentation :-  Leg swelling    Discharge Assessment and Plan:-   1. RIVAS, resolved: likely secondary to diuretic use. Stop IV Lasix today. Resume torsemide home dose and hold, hold spironolactone. Given #2, will hold off on IVF. Repeat creatinine later on this evening. Avoid further nephrotoxic agents. Repeat BMP in a.m.  2/5: okay to resume diuretics. Pt instructed to get repeat BMP in 1 week and f/u with PCP. 2. B/L LE edema / fluid overload, improved: Per patient she has had multiple adjustments lately to her diuretics. Established with Dr. Maggie Cagle in cardiology. Cardiology has been consulted. Continue with Lasix 40 mg IV twice daily. Strict I/os. Daily weights. Per cardiology, once diuresing well we will continue torsemide. Continue Aldactone. BMP in a.m. to monitor kidney function  2/5: net neg 7 L. Continue Torsemide, Aldactone, and PRN Zaroxolyn. F/u with CHF clinic and Cardiology. Encouraged to f/u dietary / fluid restriction. 3. Chronic hypoxic respiratory failure / COPD without acute exacerbation: Patient stable on 2 L NC which she reports is her baseline. No home inhalers listed. Stable. 4. CAD: ASA, statin  5. Essential hypertension: Resume home meds, continue to monitor BP  6. NIDDM II with polyneuropathy: Hold home Metformin. SSI. Accu. Hypoglycemia protocol in place. Carb controlled diet, fluid restriction. 7. GERD: PPI  8. Hyperlipidemia: Statin  9. Acquired hypothyroidism: Continue daily home Synthroid  10. PTSD: Continue home medications  11. Physical deconditioning: PT/OT, could benefit from home health. Will consult SW.  12. Anxiety: Patient reports good response to PRN atarax.  Discussed she would need to f/u with PCP to discuss prescription if desired.        Initial H and P and Hospital course:-  \"This is a 65-year-old female with mitral regurgitation, CAD, COPD, dementia, diabetes mellitus type 2, GERD, essential hypertension, hyperlipidemia, PTSD presenting with bilateral lower extremity swelling. Patient states that no matter what she does her leg swelling will not improve.  Patient's leg swelling was getting worse and patient is getting pain every time she walks due to the swelling.  Patient states that her diuretics were rearranged multiple times recently. Miranda Leos is supposed to be on fluid restriction but patient admits that she was not compliant with the fluid restriction. Patient was reluctant to be discharged to home. [facility] requested observation admission. Patient has no other acute medical issue at this time.  Patient is urinating well with IV Lasix dose given in ER. \"     2/2: pt doing well, laughing and joking lying in bed. Reports good urine output and no dysuria. Denies CP/SOB. States her swelling has improved some.      2/3: pt is very talkative today, states she doesn't want to see anyone except her providers, nurses, and lunch people. First issue she brings up is that she will not follow a carb control diet and that she refuses to get into the chair. Pt keeps crying. Denies CP/SOB. Still feels like she can't move well and has pain in her legs from swelling.      2/4: Patient again very talkative today, very interested in talking about her anxiety medications. Discussed with patient that she would likely benefit from outpatient psychiatric follow-up. Patient reported that she will not go home until she is able to take p.o. medication to see \"if it works \". Stopped patient's IV diuretics today. Patient denies chest pain or shortness of breath. Please see above for full details.   Patient was admitted secondary to fluid overload and bilateral lower extremity edema that was making patient's daily activities at home difficult for her. Patient is established with cardiology. Patient was admitted and treated with IV diuretics with good urinary output. Patient did develop an RIVAS, however diuretics were held and this resolved. Discussed with cardiology, patient is to continue her diuretic therapy and follow-up with CHF and cardiology as an outpatient. She will have a repeat BMP in 1 week's time. Additionally, patient requested to be given Atarax prescription. She was told that she will need to follow-up with her PCP and they could determine what would be best to control patient's anxiety. All VSS and suitable for discharge home. Physical Exam:-  Vitals:   Patient Vitals for the past 24 hrs:   BP Temp Temp src Pulse Resp SpO2 Weight   02/05/22 0834 (!) 115/55 98.4 °F (36.9 °C) Oral 72 16 94 %    02/05/22 0630       231 lb 14.4 oz (105.2 kg)   02/05/22 0530 119/69 98.7 °F (37.1 °C) Oral 69 16 96 %    02/04/22 2030 (!) 120/58 98.7 °F (37.1 °C) Oral 78 16 95 %    02/04/22 1818     16     02/04/22 1700     16     02/04/22 1424 121/67 98.4 °F (36.9 °C) Oral 73 18 98 %      Weight:   Weight: 231 lb 14.4 oz (105.2 kg)   24 hour intake/output:     Intake/Output Summary (Last 24 hours) at 2/5/2022 1051  Last data filed at 2/5/2022 0925  Gross per 24 hour   Intake 1100 ml   Output 2050 ml   Net -950 ml       1. General appearance: Obese, chatty, no apparent distress, appears stated age and cooperative. 2. HEENT: Normal cephalic, atraumatic without obvious deformity. Pupils equal, round, and reactive to light. Extra ocular muscles intact. Conjunctivae/corneas clear. 3. Neck: Supple, with full range of motion. No jugular venous distention. Trachea midline. 4. Respiratory:  Normal respiratory effort. Clear to auscultation, bilaterally without Rales/Wheezes/Rhonchi. 5. Cardiovascular: Regular rate and rhythm with normal S1/S2 without murmurs, rubs or gallops.   6. Abdomen: Soft, non-tender, non-distended with normal bowel sounds. 7. Musculoskeletal:  No clubbing, cyanosis, +2 edema bilateral lower extremities (patient states at baseline)  8. Skin: Skin color, texture, turgor normal.  No rashes or lesions. 9. Neurologic:  Neurovascularly intact without any focal sensory/motor deficits. Cranial nerves: II-XII intact, grossly non-focal.  10. Psychiatric: Alert and oriented x4, thought content appropriate, normal insight  11. Capillary Refill: Brisk,< 3 seconds   12. Peripheral Pulses: +1 palpable, equal bilaterally       Discharge Medications:-      Medication List      CHANGE how you take these medications    metFORMIN 500 MG tablet  Commonly known as: GLUCOPHAGE  Take 1 tablet by mouth 2 times daily (with meals)  What changed: additional instructions        CONTINUE taking these medications    Alcohol Prep 70 % Pads  Us e3 imes per day Diagnosis:  Diabetes Non-Insulin Dependent     atorvastatin 40 MG tablet  Commonly known as: LIPITOR     blood glucose test strips  Test 3 times a day & as needed for symptoms of irregular blood glucose. Dispense sufficient amount for indicated testing frequency plus additional to accommodate PRN testing needs.      ferrous sulfate 325 (65 Fe) MG tablet  Commonly known as: IRON 325     FLUoxetine 20 MG capsule  Commonly known as: PROZAC     folic acid 1 MG tablet  Commonly known as: FOLVITE     gabapentin 400 MG capsule  Commonly known as: NEURONTIN     glucose monitoring kit  1 kit by Does not apply route daily     levothyroxine 25 MCG tablet  Commonly known as: SYNTHROID     memantine ER 28 MG Cp24 extended release capsule  Commonly known as: NAMENDA XR     metOLazone 2.5 MG tablet  Commonly known as: ZAROXOLYN  Take 1 tablet by mouth daily as needed (as directed by CHF clinic)     nystatin 624829 UNIT/GM powder  Commonly known as: MYCOSTATIN     pantoprazole 40 MG tablet  Commonly known as: PROTONIX  Take 1 tablet by mouth every morning (before breakfast) ramelteon 8 MG tablet  Commonly known as: ROZEREM     spironolactone 25 MG tablet  Commonly known as: Aldactone  Take 1 tablet by mouth daily     torsemide 20 MG tablet  Commonly known as: DEMADEX  Take 1 tablet by mouth daily     traZODone 100 MG tablet  Commonly known as: DESYREL             Labs :-  Recent Results (from the past 72 hour(s))   POCT Glucose    Collection Time: 02/02/22 12:03 PM   Result Value Ref Range    POC Glucose 165 (H) 70 - 108 mg/dl   POCT Glucose    Collection Time: 02/02/22  5:24 PM   Result Value Ref Range    POC Glucose 163 (H) 70 - 108 mg/dl   POCT Glucose    Collection Time: 02/02/22  8:44 PM   Result Value Ref Range    POC Glucose 127 (H) 70 - 108 mg/dl   CBC    Collection Time: 02/03/22  3:49 AM   Result Value Ref Range    WBC 5.2 4.8 - 10.8 thou/mm3    RBC 3.43 (L) 4.20 - 5.40 mill/mm3    Hemoglobin 10.5 (L) 12.0 - 16.0 gm/dl    Hematocrit 33.1 (L) 37.0 - 47.0 %    MCV 96.5 81.0 - 99.0 fL    MCH 30.6 26.0 - 33.0 pg    MCHC 31.7 (L) 32.2 - 35.5 gm/dl    RDW-CV 13.1 11.5 - 14.5 %    RDW-SD 46.3 (H) 35.0 - 45.0 fL    Platelets 329 969 - 339 thou/mm3    MPV 8.9 (L) 9.4 - 12.4 fL   Hemoglobin A1C    Collection Time: 02/03/22  3:49 AM   Result Value Ref Range    Hemoglobin A1C 6.8 (H) 4.4 - 6.4 %    AVERAGE GLUCOSE 144 (H) 70 - 126 mg/dL   Basic Metabolic Panel    Collection Time: 02/03/22  6:29 AM   Result Value Ref Range    Sodium 140 135 - 145 meq/L    Potassium 3.8 3.5 - 5.2 meq/L    Chloride 97 (L) 98 - 111 meq/L    CO2 31 23 - 33 meq/L    Glucose 137 (H) 70 - 108 mg/dL    BUN 21 7 - 22 mg/dL    CREATININE 1.1 0.4 - 1.2 mg/dL    Calcium 9.1 8.5 - 10.5 mg/dL   Anion Gap    Collection Time: 02/03/22  6:29 AM   Result Value Ref Range    Anion Gap 12.0 8.0 - 16.0 meq/L   Glomerular Filtration Rate, Estimated    Collection Time: 02/03/22  6:29 AM   Result Value Ref Range    Est, Glom Filt Rate 50 (A) ml/min/1.73m2   POCT Glucose    Collection Time: 02/03/22 12:13 PM   Result Value Ref Range    POC Glucose 193 (H) 70 - 108 mg/dl   POCT Glucose    Collection Time: 02/03/22  5:19 PM   Result Value Ref Range    POC Glucose 152 (H) 70 - 108 mg/dl   POCT Glucose    Collection Time: 02/03/22  9:52 PM   Result Value Ref Range    POC Glucose 183 (H) 70 - 108 mg/dl   Basic Metabolic Panel    Collection Time: 02/04/22  4:23 AM   Result Value Ref Range    Sodium 140 135 - 145 meq/L    Potassium 4.1 3.5 - 5.2 meq/L    Chloride 97 (L) 98 - 111 meq/L    CO2 29 23 - 33 meq/L    Glucose 159 (H) 70 - 108 mg/dL    BUN 30 (H) 7 - 22 mg/dL    CREATININE 1.3 (H) 0.4 - 1.2 mg/dL    Calcium 9.8 8.5 - 10.5 mg/dL   CBC    Collection Time: 02/04/22  4:23 AM   Result Value Ref Range    WBC 6.1 4.8 - 10.8 thou/mm3    RBC 3.62 (L) 4.20 - 5.40 mill/mm3    Hemoglobin 10.9 (L) 12.0 - 16.0 gm/dl    Hematocrit 35.1 (L) 37.0 - 47.0 %    MCV 97.0 81.0 - 99.0 fL    MCH 30.1 26.0 - 33.0 pg    MCHC 31.1 (L) 32.2 - 35.5 gm/dl    RDW-CV 13.1 11.5 - 14.5 %    RDW-SD 46.7 (H) 35.0 - 45.0 fL    Platelets 366 118 - 223 thou/mm3    MPV 8.9 (L) 9.4 - 12.4 fL   Anion Gap    Collection Time: 02/04/22  4:23 AM   Result Value Ref Range    Anion Gap 14.0 8.0 - 16.0 meq/L   Glomerular Filtration Rate, Estimated    Collection Time: 02/04/22  4:23 AM   Result Value Ref Range    Est, Glom Filt Rate 41 (A) ml/min/1.73m2   POCT Glucose    Collection Time: 02/04/22  8:23 AM   Result Value Ref Range    POC Glucose 172 (H) 70 - 108 mg/dl   POCT Glucose    Collection Time: 02/04/22 11:28 AM   Result Value Ref Range    POC Glucose 182 (H) 70 - 108 mg/dl   POCT Glucose    Collection Time: 02/04/22  5:21 PM   Result Value Ref Range    POC Glucose 146 (H) 70 - 108 mg/dl   Creatinine, Serum    Collection Time: 02/04/22  7:04 PM   Result Value Ref Range    CREATININE 1.1 0.4 - 1.2 mg/dL   Glomerular Filtration Rate, Estimated    Collection Time: 02/04/22  7:04 PM   Result Value Ref Range    Est, Glom Filt Rate 50 (A) JL/OHU/8.27A8   Basic Metabolic Panel Collection Time: 02/05/22  4:44 AM   Result Value Ref Range    Sodium 139 135 - 145 meq/L    Potassium 4.0 3.5 - 5.2 meq/L    Chloride 96 (L) 98 - 111 meq/L    CO2 30 23 - 33 meq/L    Glucose 189 (H) 70 - 108 mg/dL    BUN 37 (H) 7 - 22 mg/dL    CREATININE 1.2 0.4 - 1.2 mg/dL    Calcium 9.7 8.5 - 10.5 mg/dL   Anion Gap    Collection Time: 02/05/22  4:44 AM   Result Value Ref Range    Anion Gap 13.0 8.0 - 16.0 meq/L   Glomerular Filtration Rate, Estimated    Collection Time: 02/05/22  4:44 AM   Result Value Ref Range    Est, Glom Filt Rate 45 (A) ml/min/1.73m2   POCT Glucose    Collection Time: 02/05/22  8:14 AM   Result Value Ref Range    POC Glucose 183 (H) 70 - 108 mg/dl        Microbiology:    Blood culture #1: No results found for: BC    Blood culture #2:No results found for: BLOODCULT2    Organism:  No results found for: LABGRAM    MRSA culture only:No results found for: [facility]    Urine culture:   Lab Results   Component Value Date    LABURIN SEE BELOW 12/27/2017     No results found for: ORG     Respiratory culture: No results found for: CULTRESP    Aerobic and Anaerobic :  No results found for: LABAERO  No results found for: LABANAE    Urinalysis:      Lab Results   Component Value Date    NITRU NEGATIVE 02/01/2022    WBCUA 3-5 12/27/2017    BACTERIA 2+ 12/27/2017    RBCUA 0-2 12/27/2017    BLOODU NEGATIVE 02/01/2022    SPECGRAV 1.025 12/27/2017    GLUCOSEU NEGATIVE 02/01/2022       Radiology:-  XR CHEST (2 VW)    Result Date: 2/1/2022  PROCEDURE: XR CHEST (2 VW) CLINICAL INFORMATION: SOB COMPARISON: 6/16/2021 TECHNIQUE: AP upright and lateral views of the chest were obtained. 1. Moderate hepatomegaly. Permanent dual-chamber pacemaker. 2. No acute findings. No infiltrates or effusions are seen. **This report has been created using voice recognition software. It may contain minor errors which are inherent in voice recognition technology. ** Final report electronically signed by Dr. Anoop Jean on 2/1/2022 6:59 PM       Follow-up scheduled after discharge :-    in the next few days with Logan Tafoya MD  in the next few days with CHF clinic  With Cardiology at next scheduled appointment    Consultations during this hospital stay:-  [] NONE [x] Cardiology  [] Nephrology  [] Hemo onco   [] GI   [] ID  [] Endocrine  [] Pulm    [] Neuro    [] Psych   [] Urology  [] ENT   [] G SURGERY   []Ortho    []CV surg    [] Palliative  [] Hospice [] Pain management   [x]    []TCU   [x] PT/OT  OTHERS:-    Disposition: home  Condition at Discharge: Stable    Time Spent:- 45 minutes    Electronically signed by Sena Morgan PA-C on 2/5/22 at 10:51 AM EST   Discharging Hospitalist

## 2022-02-06 NOTE — PROGRESS NOTES
At this time patient left via stretcher with [facility] to private residence. Discharge papers and all belongings with patient.  All questions and concerns addressed.     Electronically signed by Emi Chilel RN on 2/5/2022 at 8:37 PM

## 2022-02-06 NOTE — DISCHARGE SUMMARY
At this time patient left via stretcher with [facility] to private residence. Discharge papers and all belongings with patient. All questions and concerns addressed.     Electronically signed by Kevin Wright RN on 2/5/2022 at 8:37 PM

## 2022-02-07 ENCOUNTER — TELEPHONE (OUTPATIENT)
Dept: CARDIOLOGY CLINIC | Age: 62
End: 2022-02-07

## 2022-02-07 NOTE — TELEPHONE ENCOUNTER
Called to [facility]. No referral needed. Patient just shows up first come first serve basis M-F 8-4p for evaluation. Also BMP ordered for 2/10. Needs appt scheduled next week    Called to patient, no answer. LM to call office.

## 2022-02-07 NOTE — CARE COORDINATION
2/7/22, 11:53 AM EST    DISCHARGE PLANNING EVALUATION    Message left for ODETTE Escobedo re: patient discharge on 2/5/22.

## 2022-02-08 NOTE — TELEPHONE ENCOUNTER
Patient does not want to go to [facility]. Wants to wait and f/u with PCP for referral.  Notified of labs, faxed to [facility]  appt scheduled next week in chf clinic.

## 2022-02-11 LAB
BUN BLDV-MCNC: 45 MG/DL
CALCIUM SERPL-MCNC: 10.2 MG/DL
CHLORIDE BLD-SCNC: 94 MMOL/L
CO2: 28 MMOL/L
CREAT SERPL-MCNC: 1.6 MG/DL
GFR CALCULATED: 33
GLUCOSE BLD-MCNC: 181 MG/DL
POTASSIUM SERPL-SCNC: 4.7 MMOL/L
SODIUM BLD-SCNC: 138 MMOL/L

## 2022-02-15 ENCOUNTER — TELEPHONE (OUTPATIENT)
Dept: CARDIOLOGY CLINIC | Age: 62
End: 2022-02-15

## 2022-02-15 DIAGNOSIS — I50.32 CHRONIC DIASTOLIC CHF (CONGESTIVE HEART FAILURE), NYHA CLASS 2 (HCC): Primary | ICD-10-CM

## 2022-02-16 NOTE — TELEPHONE ENCOUNTER
Swelling remains down, weight at 235 lb. Patient notified and verbalized understanding.   Labs faxed to [facility]

## 2022-02-17 NOTE — TELEPHONE ENCOUNTER
Returned patient call. [facility], we will arrange for transportation. She understands instructions as below and will have labs on 2/24/22.

## 2022-02-22 RX ORDER — ATORVASTATIN CALCIUM 40 MG/1
40 TABLET, FILM COATED ORAL DAILY
Qty: 90 TABLET | Refills: 3 | Status: SHIPPED | OUTPATIENT
Start: 2022-02-22

## 2022-02-28 LAB
BUN BLDV-MCNC: 25 MG/DL
CALCIUM SERPL-MCNC: 9.5 MG/DL
CHLORIDE BLD-SCNC: 100 MMOL/L
CO2: 27 MMOL/L
CREAT SERPL-MCNC: 1.1 MG/DL
GFR CALCULATED: 49
GLUCOSE BLD-MCNC: 163 MG/DL
POTASSIUM SERPL-SCNC: 4.7 MMOL/L
SODIUM BLD-SCNC: 141 MMOL/L

## 2022-03-08 ENCOUNTER — OFFICE VISIT (OUTPATIENT)
Dept: CARDIOLOGY CLINIC | Age: 62
End: 2022-03-08
Payer: MEDICARE

## 2022-03-08 VITALS
WEIGHT: 235 LBS | BODY MASS INDEX: 44.37 KG/M2 | HEART RATE: 84 BPM | HEIGHT: 61 IN | DIASTOLIC BLOOD PRESSURE: 68 MMHG | OXYGEN SATURATION: 94 % | SYSTOLIC BLOOD PRESSURE: 124 MMHG

## 2022-03-08 DIAGNOSIS — I25.10 CORONARY ARTERY DISEASE INVOLVING NATIVE CORONARY ARTERY OF NATIVE HEART WITHOUT ANGINA PECTORIS: ICD-10-CM

## 2022-03-08 DIAGNOSIS — F17.210 CIGARETTE NICOTINE DEPENDENCE WITHOUT COMPLICATION: ICD-10-CM

## 2022-03-08 DIAGNOSIS — I50.32 CHRONIC DIASTOLIC CHF (CONGESTIVE HEART FAILURE), NYHA CLASS 2 (HCC): Primary | ICD-10-CM

## 2022-03-08 DIAGNOSIS — I34.0 MODERATE MITRAL REGURGITATION: ICD-10-CM

## 2022-03-08 PROCEDURE — 99406 BEHAV CHNG SMOKING 3-10 MIN: CPT | Performed by: NURSE PRACTITIONER

## 2022-03-08 PROCEDURE — G8427 DOCREV CUR MEDS BY ELIG CLIN: HCPCS | Performed by: NURSE PRACTITIONER

## 2022-03-08 PROCEDURE — G8482 FLU IMMUNIZE ORDER/ADMIN: HCPCS | Performed by: NURSE PRACTITIONER

## 2022-03-08 PROCEDURE — 99214 OFFICE O/P EST MOD 30 MIN: CPT | Performed by: NURSE PRACTITIONER

## 2022-03-08 PROCEDURE — 3017F COLORECTAL CA SCREEN DOC REV: CPT | Performed by: NURSE PRACTITIONER

## 2022-03-08 PROCEDURE — 1036F TOBACCO NON-USER: CPT | Performed by: NURSE PRACTITIONER

## 2022-03-08 PROCEDURE — G8417 CALC BMI ABV UP PARAM F/U: HCPCS | Performed by: NURSE PRACTITIONER

## 2022-03-08 RX ORDER — HYDROXYZINE PAMOATE 25 MG/1
25 CAPSULE ORAL 2 TIMES DAILY
COMMUNITY
Start: 2022-02-22

## 2022-03-08 ASSESSMENT — ENCOUNTER SYMPTOMS
ABDOMINAL DISTENTION: 0
SHORTNESS OF BREATH: 0
COUGH: 0

## 2022-03-08 NOTE — PATIENT INSTRUCTIONS
You may receive a survey regarding the care you received during your visit. Your input is valuable to us. We encourage you to complete and return your survey. We hope you will choose us in the future for your healthcare needs.     Continue:  · Continue current medications  · Daily weights and record  · Fluid restriction of 2 Liters per day  · Limit sodium in diet to around 2744-5502 mg/day  · Monitor BP  · Activity as tolerated     Call the [facility] for any of the following symptoms: [phone]   Weight gain of 2-3 pounds in 1 day or 5 pounds in 1 week   Increased shortness of breath   Shortness of breath while laying down   Cough   Chest pain   Swelling in feet, ankles or legs   Tenderness or bloating in the abdomen   Fatigue    Decreased appetite or nausea    Confusion   

## 2022-03-08 NOTE — PROGRESS NOTES
disorder)     PVD (peripheral vascular disease) ([facility] [address]     Renal disease     Stage 3     Past Surgical History:   Procedure Laterality Date    CARDIAC CATHETERIZATION      CHOLECYSTECTOMY  2014    CORONARY ANGIOPLASTY WITH STENT PLACEMENT  2019    x2    ESOPHAGEAL MOTILITY STUDY Left 5/24/2019    ESOPHAGEAL MOTILITY/MANOMETRY STUDY performed by Jazmin Hernandez MD at [facility]    HAND SURGERY Right     HERNIA REPAIR  2016    HYSTERECTOMY, TOTAL ABDOMINAL  1982    OVARY REMOVAL Bilateral 1982    PACEMAKER PLACEMENT      6/15/2021    TONSILLECTOMY AND ADENOIDECTOMY      TRANSESOPHAGEAL ECHOCARDIOGRAM N/A 9/15/2021    TRANSESOPHAGEAL ECHOCARDIOGRAM performed by Shanelle Velasquez MD at [address] [place] [address]       History reviewed. No pertinent family history.   Social History     Tobacco Use    Smoking status: Former Smoker     Packs/day: 0.50     Years: 40.00     Pack years: 20.00     Types: Cigarettes     Start date: 1/1/1970    Smokeless tobacco: Never Used   Substance Use Topics    Alcohol use: Not Currently     Current Outpatient Medications   Medication Sig Dispense Refill    hydrOXYzine (VISTARIL) 25 MG capsule Take 25 mg by mouth 2 times daily as needed       atorvastatin (LIPITOR) 40 MG tablet Take 1 tablet by mouth daily 90 tablet 3    FLUoxetine (PROZAC) 20 MG capsule Take 40 mg by mouth daily       torsemide (DEMADEX) 20 MG tablet Take 1 tablet by mouth daily 30 tablet 3    metOLazone (ZAROXOLYN) 2.5 MG tablet Take 1 tablet by mouth daily as needed (as directed by CHF clinic) 10 tablet 0    ramelteon (ROZEREM) 8 MG tablet Take 8 mg by mouth nightly      spironolactone (ALDACTONE) 25 MG tablet Take 1 tablet by mouth daily 90 tablet 3    metFORMIN (GLUCOPHAGE) 500 MG tablet Take 1 tablet by mouth 2 times daily (with meals) (Patient taking differently: Take 500 mg by mouth 2 times daily (with meals) 500 mg in the AM  1000 mg at bedtime) 180 tablet 1    Alcohol Swabs (ALCOHOL PREP) 70 % PADS Us e3 imes per day Diagnosis:  Diabetes Non-Insulin Dependent 100 each 11    blood glucose monitor strips Test 3 times a day & as needed for symptoms of irregular blood glucose. Dispense sufficient amount for indicated testing frequency plus additional to accommodate PRN testing needs. 100 strip 0    glucose monitoring kit (FREESTYLE) monitoring kit 1 kit by Does not apply route daily 1 kit 0    pantoprazole (PROTONIX) 40 MG tablet Take 1 tablet by mouth every morning (before breakfast) 30 tablet 2    nystatin (MYCOSTATIN) 026360 UNIT/GM powder Apply topically 2 times daily Apply topically 2 times daily.  folic acid (FOLVITE) 1 MG tablet Take 1 mg by mouth daily      ferrous sulfate (IRON 325) 325 (65 Fe) MG tablet Take 325 mg by mouth 2 times daily      traZODone (DESYREL) 100 MG tablet Take 100 mg by mouth nightly      gabapentin (NEURONTIN) 400 MG capsule Take 800 mg by mouth 2 times daily.  memantine ER (NAMENDA XR) 28 MG CP24 extended release capsule Take 28 mg by mouth daily      levothyroxine (SYNTHROID) 25 MCG tablet Take 25 mcg by mouth Daily        No current facility-administered medications for this visit. Allergies   Allergen Reactions    Adhesive Tape      Causes skin tears    Baclofen Other (See Comments)     All muscle relaxers, Lethargy    Benadryl [Diphenhydramine]      rash    Donepezil     Flexeril [Cyclobenzaprine]      shakes    Furosemide      Pill form only. Pt able to have IV form    Norco [Hydrocodone-Acetaminophen] Other (See Comments)     Muscle weakness    Percocet [Oxycodone-Acetaminophen]     Tizanidine        SUBJECTIVE:   Review of Systems   Constitutional: Negative for activity change, appetite change and fatigue. Respiratory: Negative for cough and shortness of breath. Cardiovascular: Negative for chest pain, palpitations and leg swelling. Gastrointestinal: Negative for abdominal distention. Neurological: Negative for weakness, light-headedness and headaches. Hematological: Negative for adenopathy. Psychiatric/Behavioral: Negative for sleep disturbance. OBJECTIVE:   Today's Vitals:  /68   Pulse 84   Ht 5' 1\" (1.549 m)   Wt 235 lb (106.6 kg)   SpO2 94%   BMI 44.40 kg/m²     Physical Exam  Vitals reviewed. Constitutional:       General: She is not in acute distress. Appearance: Normal appearance. She is well-developed. She is not diaphoretic. HENT:      Head: Normocephalic and atraumatic. Eyes:      Conjunctiva/sclera: Conjunctivae normal.   Neck:      Comments: No JVD  Cardiovascular:      Rate and Rhythm: Normal rate and regular rhythm. Heart sounds: Normal heart sounds. No murmur heard. Pulmonary:      Effort: Pulmonary effort is normal. No respiratory distress. Breath sounds: Normal breath sounds. No wheezing or rales. Abdominal:      General: Bowel sounds are normal. There is no distension. Palpations: Abdomen is soft. Tenderness: There is no abdominal tenderness. Musculoskeletal:         General: Normal range of motion. Cervical back: Normal range of motion and neck supple. Right lower leg: No edema. Left lower leg: No edema. Skin:     General: Skin is warm and dry. Capillary Refill: Capillary refill takes less than 2 seconds. Neurological:      Mental Status: She is alert and oriented to person, place, and time. Coordination: Coordination normal.   Psychiatric:         Behavior: Behavior normal.           Wt Readings from Last 3 Encounters:   03/08/22 235 lb (106.6 kg)   02/05/22 231 lb 14.4 oz (105.2 kg)   01/25/22 249 lb 6.4 oz (113.1 kg)     BP Readings from Last 3 Encounters:   03/08/22 124/68   02/05/22 131/61   01/25/22 132/78     Pulse Readings from Last 3 Encounters:   03/08/22 84   02/05/22 66   01/25/22 87     Body mass index is 44.4 kg/m².     THERON   Ejection fraction was estimated at 50-55%.   Left atrial size was severely dilated with no thrombus identified.   The mitral valve structure was thickened with reduced coaptation and   normal leaflet separation. DOPPLER: The transmitral velocity was within   the normal range with no evidence for mitral stenosis. There was moderate   mitral regurgitation.   Mild tricuspid regurgitation.      Signature      ----------------------------------------------------------------   Electronically signed by Marizol Perkins MD (Interpreting   [id number]) on 09/15/2021 at 04:58 PM   ----------------------------------------------------------------        Echo: 6/11/21  Summary   Probe easily inserted by CardiologistAnanya Albarado performed without complications.   The study included complete 2D imaging, complete spectral doppler, and   color doppler.   The transesophageal approach was used.   Risk benefits and alternatives were explained to the patient and informed   consent was obtained.   Ejection fraction was estimated at 50-55%.   Left atrial size was severely dilated with no thrombus identified.   APPENDAGE: The left atrial appendage size was normal with no thrombus   identified. DOPPLER: The function was normal (normal emptying velocity).  ATRIAL SEPTUM: Small PFO identified by color and bubble study.   The mitral valve structure was degenerated with restriction of the   posterior leaflet. DOPPLER: The transmitral velocity was within the normal   range with no evidence for mitral stenosis. There was severe mitral   regurgitation (mean gradient 2 mmHg).    Mild tricuspid regurgitation.      Signature      ----------------------------------------------------------------   Electronically signed by Marizol Perkins MD        CATH/STRESS:   Left Heart Cath: 6/13/21  RIGHT HEART CATHETERIZATION:  RA 26, RV 62/22, PA 67/30 with a mean of  41, wedge pressure 32 along with PA saturation of 64%.     CORONARY ANGIOGRAM:  LEFT MAIN:  Mild luminal irregularities but patent without any  significant obstruction.     LAD:  30% stenosis proximally and with a patent stent to the proximal  segment, distally has luminal irregularities with no significant  obstruction.  Small diagonal branch without any significant obstruction.     LCX:  Patent in the proximal segment.  No significant obstruction. Bifurcates into a small AV groove branch and a larger OM1 branch without  any significant obstruction.     RCA:  Luminal irregularities throughout without any significant  obstruction.  Bifurcates into a very small PLB and larger PDA.  RCA is  dominant.     LV:  LV systolic pressure is 052.  No significant LV to AO systolic  gradient.  LVEDP is 30.  LVEF is 45 to 50%.  Mild to moderate dilation. Aortic valve is tricuspid.   No significant aneurysm or dissection of  the visualized portion of the aorta.  There is 4+ mitral regurgitation  with reflux into the pulmonary veins.     SUMMARY:  Severe mitral regurgitation; no significant coronary artery  disease; elevated right heart cath pressures with preserved cardiac  output.     PLAN:  1.  Bedrest.  2.  Optimal medical therapy. 3.  Risk factor management. 4.  Routine access site care. 5.  Guideline-directed therapy for acute heart failure. 6.  Uptitrate diuretics. 8.  Holding mitral valve workup as outpatient. 9.  CT Surgery consultation. 10.  Follow up with myself in two to four weeks postprocedure.     All the above was explained to the patient and the patient's family.    They are agreeable and amenable to the above plan.        Ayanna Mason MD    Results reviewed:  BNP:   Lab Results   Component Value Date     (H) 08/06/2021     CBC:   Lab Results   Component Value Date    WBC 6.1 02/04/2022    RBC 3.62 02/04/2022    RBC 4.52 12/27/2017    HGB 10.9 02/04/2022    HCT 35.1 02/04/2022     02/04/2022     CMP:    Lab Results   Component Value Date     02/28/2022    K 4.7 02/28/2022    K 4.1 02/02/2022     02/28/2022    CO2 27 02/28/2022    BUN 25 02/28/2022    CREATININE 1.1 02/28/2022    AGRATIO 1.1 12/27/2017    LABGLOM 49 02/28/2022    GLUCOSE 163 02/28/2022    CALCIUM 9.5 02/28/2022     Hepatic Function Panel:    Lab Results   Component Value Date    ALKPHOS 78 02/02/2022    ALT 12 02/02/2022    AST 14 02/02/2022    PROT 5.9 02/02/2022    BILITOT 0.3 02/02/2022    BILITOT 0.7 12/27/2017    BILIDIR <0.2 02/01/2022    LABALBU 3.6 02/02/2022     Magnesium:    Lab Results   Component Value Date    MG 1.8 06/12/2021     PT/INR:    Lab Results   Component Value Date    INR 0.83 09/15/2021     Lipids:    Lab Results   Component Value Date    TRIG 124 12/27/2017       ASSESSMENT AND PLAN:   The patient's condition/symptoms are Stable: No clinical evidence of fluid overload today. Continue current medical regimen without changes at present time. Diagnosis Orders   1. Chronic diastolic CHF (congestive heart failure), NYHA class 2 ([facility])  Basic Metabolic Panel   2. Coronary artery disease involving native coronary artery of native heart without angina pectoris     3. Moderate mitral regurgitation     4. Cigarette nicotine dependence without complication       Continue:  GDMT:              ACE/ARB/ARNI - None              BB - none d/t borderline bradycardia              Diuretic -  Torsemide 20/day, Metolazone PRN (not taken any)  AA - Aldactone 25/day  Vasodilator - none  Continue Current medications:  Fe sulfate  Stable, appears Euvolemic  Lab reviewed - stable  Repeat blood work in 3 months   BP/HR stable   No med changes today   Encouraged better fluid adherence - watch sodium intake. Continue daily wts. Smoking again - recommended cessation - not interested in quitting. Discussed 3 min  F/U w/ Cardiology  F/U in clinic in 6 months    Tolerating above noted HF meds, no ill side effects noted. Will continue to monitor kidney function and electrolytes. Will optimize as tolerated. Pt is compliant w/ medications.     Total visit time of 30 minutes has been spent with patient on education of symptoms, management, medication, and plan of care; as well as review of chart: labs, ECHO, radiology reports, etc.   I personally spent more then 50% of the appt time face to face with the patient. · Daily weights  · Fluid restriction of 2 Liters per day  · Limit sodium in diet to around 0520-5147 mg/day  · Monitor BP  · Activity as tolerated     Patient was instructed to call the [employer] for any changes in symptoms as noted in AVS.      Return in about 4 months (around 7/8/2022). or sooner if needed     Patient given educational materials - see patient instructions. We discussed the importance of weighing oneself and recording daily. We also discussed the importance of a low sodium diet, higher sodium foods to avoid and better low sodium food options. Patient verbalizes understanding of plan of care using teach back method, and is agreeable to the treatment plan.        Electronically signed by SHERMAN Amaro CNP on 3/8/2022 at 12:30 PM

## 2022-04-07 RX ORDER — TORSEMIDE 20 MG/1
20 TABLET ORAL DAILY
Qty: 30 TABLET | Refills: 3 | Status: SHIPPED | OUTPATIENT
Start: 2022-04-07 | End: 2022-04-11 | Stop reason: SDUPTHER

## 2022-04-11 RX ORDER — TORSEMIDE 20 MG/1
20 TABLET ORAL DAILY
Qty: 30 TABLET | Refills: 3 | Status: SHIPPED | OUTPATIENT
Start: 2022-04-11 | End: 2022-06-17

## 2022-04-14 ENCOUNTER — PROCEDURE VISIT (OUTPATIENT)
Dept: CARDIOLOGY CLINIC | Age: 62
End: 2022-04-14
Payer: MEDICARE

## 2022-04-14 DIAGNOSIS — Z95.0 PACEMAKER: Primary | ICD-10-CM

## 2022-04-14 PROCEDURE — 93294 REM INTERROG EVL PM/LDLS PM: CPT | Performed by: INTERNAL MEDICINE

## 2022-04-14 PROCEDURE — 93296 REM INTERROG EVL PM/IDS: CPT | Performed by: INTERNAL MEDICINE

## 2022-04-14 NOTE — PROGRESS NOTES
carelink medtronic dual pacer   Fanta Rich     Had at lead position failure 6/17/21, again 4/13/22  In office threshold 6/30/21 was 1.5 @ 0.4, device measuring 0.75 @ 0.4. Programmed DDDR w/ long AV delays for this issue     . [facility]:  13.[facility]   Presenting rhythm  AS VS     Atrial impedance 513  RV impedance 418    P wave sensing 2.5  R wave sensing 18.3    32.2 % atrial paced  0.5 % RV paced     Atrial threshold 0.75 V  at 0.4ms  RV threshold 0.875 V at 0.4ms  Mode switches   0.5% no strips to verify

## 2022-05-04 RX ORDER — TORSEMIDE 20 MG/1
TABLET ORAL
Qty: 30 TABLET | Refills: 3 | OUTPATIENT
Start: 2022-05-04

## 2022-06-17 RX ORDER — TORSEMIDE 20 MG/1
TABLET ORAL
Qty: 90 TABLET | Refills: 1 | Status: SHIPPED | OUTPATIENT
Start: 2022-06-17

## 2022-07-21 ENCOUNTER — PROCEDURE VISIT (OUTPATIENT)
Dept: CARDIOLOGY CLINIC | Age: 62
End: 2022-07-21
Payer: MEDICARE

## 2022-07-21 DIAGNOSIS — Z95.0 PACEMAKER: Primary | ICD-10-CM

## 2022-07-21 PROCEDURE — 93294 REM INTERROG EVL PM/LDLS PM: CPT | Performed by: INTERNAL MEDICINE

## 2022-07-21 PROCEDURE — 93296 REM INTERROG EVL PM/IDS: CPT | Performed by: INTERNAL MEDICINE

## 2022-07-21 NOTE — PROGRESS NOTES
[employer] medtronic dual pacer   Failed atrial lead position check, we've been watching this, programmed DDDR, MVP off  all else looks good   6/25/22 short run SVT     . Erin Ibis Battery longevity:  13.1 years   Presenting rhythm  AP VS     Atrial impedance 494  RV impedance 399    P wave sensing 1.8  R wave sensing 20    78.8 % atrial paced  5.9 % RV paced     Atrial threshold 0.75 V  at 0.4ms  RV threshold 1 V at 0.4ms  Mode switches   0

## 2022-09-13 ENCOUNTER — OFFICE VISIT (OUTPATIENT)
Dept: CARDIOLOGY CLINIC | Age: 62
End: 2022-09-13
Payer: MEDICARE

## 2022-09-13 VITALS
OXYGEN SATURATION: 99 % | BODY MASS INDEX: 42.44 KG/M2 | DIASTOLIC BLOOD PRESSURE: 62 MMHG | WEIGHT: 224.6 LBS | HEART RATE: 90 BPM | SYSTOLIC BLOOD PRESSURE: 110 MMHG

## 2022-09-13 DIAGNOSIS — I25.10 CORONARY ARTERY DISEASE INVOLVING NATIVE CORONARY ARTERY OF NATIVE HEART WITHOUT ANGINA PECTORIS: ICD-10-CM

## 2022-09-13 DIAGNOSIS — I50.32 CHRONIC DIASTOLIC CHF (CONGESTIVE HEART FAILURE), NYHA CLASS 2 (HCC): Primary | ICD-10-CM

## 2022-09-13 DIAGNOSIS — I34.0 MODERATE MITRAL REGURGITATION: ICD-10-CM

## 2022-09-13 PROCEDURE — 3017F COLORECTAL CA SCREEN DOC REV: CPT | Performed by: NURSE PRACTITIONER

## 2022-09-13 PROCEDURE — 99214 OFFICE O/P EST MOD 30 MIN: CPT | Performed by: NURSE PRACTITIONER

## 2022-09-13 PROCEDURE — 1036F TOBACCO NON-USER: CPT | Performed by: NURSE PRACTITIONER

## 2022-09-13 PROCEDURE — G8427 DOCREV CUR MEDS BY ELIG CLIN: HCPCS | Performed by: NURSE PRACTITIONER

## 2022-09-13 PROCEDURE — G8417 CALC BMI ABV UP PARAM F/U: HCPCS | Performed by: NURSE PRACTITIONER

## 2022-09-13 RX ORDER — ALBUTEROL SULFATE 90 UG/1
AEROSOL, METERED RESPIRATORY (INHALATION)
COMMUNITY
Start: 2022-09-07

## 2022-09-13 RX ORDER — CHOLECALCIFEROL (VITAMIN D3) 125 MCG
10 CAPSULE ORAL NIGHTLY
COMMUNITY

## 2022-09-13 ASSESSMENT — ENCOUNTER SYMPTOMS
ABDOMINAL DISTENTION: 0
SHORTNESS OF BREATH: 0
COUGH: 0

## 2022-09-13 NOTE — PROGRESS NOTES
Heart Failure Clinic       Visit Date: 9/13/2022  Cardiologist:  Dr. Garcia [facility]  Primary Care Physician: Dr. Willie Casas MD    Kern Boast is a 60 y.o. female who presents today for:  Chief Complaint   Patient presents with    Congestive Heart Failure       HPI:   Kern Boast is a 60 y.o. female who presents to the office for a follow up patient visit in the heart failure clinic. Accompanied by no one    TYPE HF: HFpEF (EF 50-55%), Mod MR (prior MitraClip workup no longer indicated)  Device: Pacer [facility] 6/15/2021)  HX: HTN, CAD s/p PCI [facility], [address] [place] [address]), DM, Asthma/COPD, Home O2 2L (since 2015), 3L activity (Dr Tita Fisher), former smoker (hx 20pk/yr), CKD stg 3, Dementia, Depression, Obesity (highest 321#), Neuropathy/dizziness -Dr Miriam Scott     Hospitalization:  June 2021 - fatigue. Anemia - Hgb 9.8 (received 2U RBC at ). Bradycardia - Pacemaker placed  [facility] - patent coronaries - PCW 32, LVEDP 30  OV 1/25/22 - 249# - IV Lasix given - changed to Torsemide - 3 days Metolazone  2/1-2/5 - admitted for fluid overload. 7L off.  231# at d/c     Last OV 3/2022 - 235#  Today: Down 11# - 224#   Feeling pretty good - no SOB noted. Looks great today. Had issue last month, bloating, SOB - realized skipped water pill several days.       Patient has:  Chest Pain: no  SOB: chronic  Orthopnea/PND: no  KELVIN: no  Edema: stable  Fatigue: no  Abdominal bloating: no  Cough: no  Appetite: good    Home weight: stable  Home blood pressure:not check    Past Medical History:   Diagnosis Date    Agoraphobia     Anemia     Anxiety     Asthma     CAD (coronary artery disease)     COPD (chronic obstructive pulmonary disease) (HCC)     Dementia (HCC)     Depression     Diabetes mellitus (HCC)     GERD (gastroesophageal reflux disease)     Hypertension     Medtronic dual pacemaker  1/13/2022    Neuropathy     PTSD (post-traumatic stress disorder)     PVD (peripheral vascular disease) ([facility])     Renal disease     Stage 3     Past Surgical History:   Procedure Laterality Date    CARDIAC CATHETERIZATION      CHOLECYSTECTOMY  2014    CORONARY ANGIOPLASTY WITH STENT PLACEMENT  2019    x2    ESOPHAGEAL MOTILITY STUDY Left 5/24/2019    ESOPHAGEAL MOTILITY/MANOMETRY STUDY performed by Virginie Hanna MD at [facility]    HAND SURGERY Right     HERNIA REPAIR  2016    HYSTERECTOMY, TOTAL ABDOMINAL (CERVIX REMOVED)  1982    OVARY REMOVAL Bilateral 1982    PACEMAKER PLACEMENT      6/15/2021    TONSILLECTOMY AND ADENOIDECTOMY      TRANSESOPHAGEAL ECHOCARDIOGRAM N/A 9/15/2021    TRANSESOPHAGEAL ECHOCARDIOGRAM performed by Christin Stephens MD at [facility] 1       History reviewed. No pertinent family history.   Social History     Tobacco Use    Smoking status: Former     Packs/day: 0.50     Years: 40.00     Pack years: 20.00     Types: Cigarettes     Start date: 1/1/1970    Smokeless tobacco: Never   Substance Use Topics    Alcohol use: Not Currently     Current Outpatient Medications   Medication Sig Dispense Refill    metFORMIN (GLUCOPHAGE) 1000 MG tablet Take 1,000 mg by mouth 2 times daily (with meals)      melatonin 5 MG TABS tablet Take 10 mg by mouth nightly      OXYGEN Inhale 2 L into the lungs continuous      torsemide (DEMADEX) 20 MG tablet TAKE 1 TABLET BY MOUTH EVERY DAY 90 tablet 1    hydrOXYzine (VISTARIL) 25 MG capsule Take 25 mg by mouth in the morning and at bedtime And prn      atorvastatin (LIPITOR) 40 MG tablet Take 1 tablet by mouth daily 90 tablet 3    FLUoxetine (PROZAC) 20 MG capsule Take 50 mg by mouth daily      metOLazone (ZAROXOLYN) 2.5 MG tablet Take 1 tablet by mouth daily as needed (as directed by CHF clinic) 10 tablet 0    spironolactone (ALDACTONE) 25 MG tablet Take 1 tablet by mouth daily 90 tablet 3    pantoprazole (PROTONIX) 40 MG tablet Take 1 tablet by mouth every morning (before breakfast) 30 tablet 2    nystatin (MYCOSTATIN) 675555 UNIT/GM powder Apply topically 2 times daily Apply topically 2 times daily. folic acid (FOLVITE) 1 MG tablet Take 1 mg by mouth daily      ferrous sulfate (IRON 325) 325 (65 Fe) MG tablet Take 325 mg by mouth 2 times daily      gabapentin (NEURONTIN) 400 MG capsule Take 800 mg by mouth 2 times daily. memantine ER (NAMENDA XR) 28 MG CP24 extended release capsule Take 28 mg by mouth daily      levothyroxine (SYNTHROID) 25 MCG tablet Take 25 mcg by mouth Daily       albuterol sulfate HFA (PROVENTIL;VENTOLIN;PROAIR) 108 (90 Base) MCG/ACT inhaler INHALE 2 PUFFS EVERY 6 HOURS AS NEEDED FOR SHORTNESS OF BREATH OR WHEEZING. Alcohol Swabs (ALCOHOL PREP) 70 % PADS Us e3 imes per day Diagnosis:  Diabetes Non-Insulin Dependent 100 each 11    blood glucose monitor strips Test 3 times a day & as needed for symptoms of irregular blood glucose. Dispense sufficient amount for indicated testing frequency plus additional to accommodate PRN testing needs. 100 strip 0    glucose monitoring kit (FREESTYLE) monitoring kit 1 kit by Does not apply route daily 1 kit 0     No current facility-administered medications for this visit. Allergies   Allergen Reactions    Adhesive Tape      Causes skin tears    Baclofen Other (See Comments)     All muscle relaxers, Lethargy    Benadryl [Diphenhydramine]      rash    Donepezil     Flexeril [Cyclobenzaprine]      shakes    Furosemide      Pill form only. Pt able to have IV form    Norco [Hydrocodone-Acetaminophen] Other (See Comments)     Muscle weakness    Percocet [Oxycodone-Acetaminophen]     Tizanidine        SUBJECTIVE:   Review of Systems   Constitutional:  Negative for activity change, appetite change and fatigue. Respiratory:  Negative for cough and shortness of breath. Cardiovascular:  Negative for chest pain, palpitations and leg swelling. Gastrointestinal:  Negative for abdominal distention. Neurological:  Negative for weakness, light-headedness and headaches.    Hematological:  Negative for adenopathy. Psychiatric/Behavioral:  Negative for sleep disturbance. OBJECTIVE:   Today's Vitals:  /62   Pulse 90   Wt 224 lb 9.6 oz (101.9 kg)   SpO2 99%   BMI 42.44 kg/m²     Physical Exam  Vitals reviewed. Constitutional:       General: She is not in acute distress. Appearance: Normal appearance. She is well-developed. She is not diaphoretic. HENT:      Head: Normocephalic and atraumatic. Eyes:      Conjunctiva/sclera: Conjunctivae normal.   Neck:      Comments: No JVD  Cardiovascular:      Rate and Rhythm: Normal rate and regular rhythm. Heart sounds: Normal heart sounds. No murmur heard. Pulmonary:      Effort: Pulmonary effort is normal. No respiratory distress. Breath sounds: Normal breath sounds. No wheezing or rales. Abdominal:      General: Bowel sounds are normal. There is no distension. Palpations: Abdomen is soft. Tenderness: There is no abdominal tenderness. Musculoskeletal:         General: Normal range of motion. Cervical back: Normal range of motion and neck supple. Right lower leg: No edema. Left lower leg: No edema. Skin:     General: Skin is warm and dry. Capillary Refill: Capillary refill takes less than 2 seconds. Neurological:      Mental Status: She is alert and oriented to person, place, and time. Coordination: Coordination normal.   Psychiatric:         Behavior: Behavior normal.         Wt Readings from Last 3 Encounters:   09/13/22 224 lb 9.6 oz (101.9 kg)   03/08/22 235 lb (106.6 kg)   02/05/22 231 lb 14.4 oz (105.2 kg)     BP Readings from Last 3 Encounters:   09/13/22 110/62   03/08/22 124/68   02/05/22 131/61     Pulse Readings from Last 3 Encounters:   09/13/22 90   03/08/22 84   02/05/22 66     Body mass index is 42.44 kg/m². THERON   Ejection fraction was estimated at 50-55%. Left atrial size was severely dilated with no thrombus identified.    The mitral valve structure was thickened with reduced coaptation and   normal leaflet separation. DOPPLER: The transmitral velocity was within   the normal range with no evidence for mitral stenosis. There was moderate   mitral regurgitation. Mild tricuspid regurgitation. Signature      ----------------------------------------------------------------   Electronically signed by Lisa Encarnacion MD (Interpreting   physician) on 09/15/2021 at 04:58 PM   ----------------------------------------------------------------        Echo: 6/11/21  Summary   Probe easily inserted by Cardiologist.   Procedure performed without complications. The study included complete 2D imaging, complete spectral doppler, and   color doppler. The transesophageal approach was used. Risk benefits and alternatives were explained to the patient and informed   consent was obtained. Ejection fraction was estimated at 50-55%. Left atrial size was severely dilated with no thrombus identified. APPENDAGE: The left atrial appendage size was normal with no thrombus   identified. DOPPLER: The function was normal (normal emptying velocity). ATRIAL SEPTUM: Small PFO identified by color and bubble study. The mitral valve structure was degenerated with restriction of the   posterior leaflet. DOPPLER: The transmitral velocity was within the normal   range with no evidence for mitral stenosis. There was severe mitral   regurgitation (mean gradient 2 mmHg). Mild tricuspid regurgitation. Signature      ----------------------------------------------------------------   Electronically signed by Lisa Encarnacion MD        CATH/STRESS:   Left Heart Cath: 6/13/21  RIGHT HEART CATHETERIZATION:  RA 26, RV 62/22, PA 67/30 with a mean of  41, wedge pressure 32 along with PA saturation of 64%. CORONARY ANGIOGRAM:  LEFT MAIN:  Mild luminal irregularities but patent without any  significant obstruction.      LAD:  30% stenosis proximally and with a patent stent to the proximal  segment, distally has luminal irregularities with no significant  obstruction. Small diagonal branch without any significant obstruction. LCX:  Patent in the proximal segment. No significant obstruction. Bifurcates into a small AV groove branch and a larger OM1 branch without  any significant obstruction. RCA:  Luminal irregularities throughout without any significant  obstruction. Bifurcates into a very small PLB and larger PDA. RCA is  dominant. LV:  LV systolic pressure is 283. No significant LV to AO systolic  gradient. LVEDP is 30. LVEF is 45 to 50%. Mild to moderate dilation. Aortic valve is tricuspid. No significant aneurysm or dissection of  the visualized portion of the aorta. There is 4+ mitral regurgitation  with reflux into the pulmonary veins. SUMMARY:  Severe mitral regurgitation; no significant coronary artery  disease; elevated right heart cath pressures with preserved cardiac  output. PLAN:  1. Bedrest.  2.  Optimal medical therapy. 3.  Risk factor management. 4.  Routine access site care. 5.  Guideline-directed therapy for acute heart failure. 6.  Uptitrate diuretics. 8.  Holding mitral valve workup as outpatient. 9.  CT Surgery consultation. 10.  Follow up with myself in two to four weeks postprocedure. All the above was explained to the patient and the patient's family. They are agreeable and amenable to the above plan.         Юлия Rahman MD  Results reviewed:  BNP:   Lab Results   Component Value Date     (H) 08/06/2021     CBC:   Lab Results   Component Value Date/Time    WBC 6.1 02/04/2022 04:23 AM    RBC 3.62 02/04/2022 04:23 AM    RBC 4.52 12/27/2017 12:12 PM    HGB 10.9 02/04/2022 04:23 AM    HCT 35.1 02/04/2022 04:23 AM     02/04/2022 04:23 AM     CMP:    Lab Results   Component Value Date/Time     02/28/2022 11:13 AM    K 4.7 02/28/2022 11:13 AM    K 4.1 02/02/2022 03:56 AM     02/28/2022 11:13 AM    CO2 27 02/28/2022 11:13 AM    BUN 25 02/28/2022 11:13 AM    CREATININE 1.1 02/28/2022 11:13 AM    AGRATIO 1.1 12/27/2017 12:12 PM    LABGLOM 49 02/28/2022 11:13 AM    GLUCOSE 163 02/28/2022 11:13 AM    CALCIUM 9.5 02/28/2022 11:13 AM     Hepatic Function Panel:    Lab Results   Component Value Date/Time    ALKPHOS 78 02/02/2022 03:56 AM    ALT 12 02/02/2022 03:56 AM    AST 14 02/02/2022 03:56 AM    PROT 5.9 02/02/2022 03:56 AM    BILITOT 0.3 02/02/2022 03:56 AM    BILITOT 0.7 12/27/2017 12:12 PM    BILIDIR <0.2 02/01/2022 06:15 PM    LABALBU 3.6 02/02/2022 03:56 AM     Magnesium:    Lab Results   Component Value Date/Time    MG 1.8 06/12/2021 10:39 AM     PT/INR:    Lab Results   Component Value Date/Time    INR 0.83 09/15/2021 11:06 AM     Lipids:    Lab Results   Component Value Date/Time    TRIG 124 12/27/2017 12:12 PM       ASSESSMENT AND PLAN:      Diagnosis Orders   1. Chronic diastolic CHF (congestive heart failure), NYHA class 2 ([address]        2. Moderate mitral regurgitation        3. Coronary artery disease involving native coronary artery of native heart without angina pectoris          Continue:  GDMT:              ACE/ARB/ARNI - None              BB - none              Diuretic -  Torsemide 20/day, Metolazone PRN (not taken any)  AA - Aldactone 25/day  Vasodilator - none  Continue Current medications:  Fe sulfate  HFpEF (50%), NYHA II  Mod MR  CAD s/p PCI ([address] [place] [address], [facility])    Stable, appears Euvolemic  No recent issues - no recent med changes. Wt down some  Encouraged continued wt loss. Lab reviewed - stable - creat up slightly = ensure getting enough fluids  Repeat blood work in 6 mth w/ PCP  BP/HR stable   No med changes today   Continue diet/fluid adherence  Continue daily wts. F/U w/ Cardiology  F/U in clinic in 1 yr - if stable will change to PRN at next OV - pt has not had any issues w/ CHF exac in over year, doing well. Pt agrees w/ plan. Tolerating above noted HF meds, no ill side effects noted.  Will continue to monitor kidney

## 2022-09-13 NOTE — PATIENT INSTRUCTIONS
You may receive a survey regarding the care you received during your visit. Your input is valuable to us. We encourage you to complete and return your survey. We hope you will choose us in the future for your healthcare needs.     Continue:  Continue current medications  Daily weights and record  Fluid restriction of 2 Liters per day  Limit sodium in diet to around 4075-4193 mg/day  Monitor BP  Activity as tolerated     Call the Heart Failure Clinic for any of the following symptoms: [phone]  Weight gain of 2-3 pounds in 1 day or 5 pounds in 1 week  Increased shortness of breath  Shortness of breath while laying down  Cough  Chest pain  Swelling in feet, ankles or legs  Tenderness or bloating in the abdomen  Fatigue   Decreased appetite or nausea   Confusion

## 2022-10-27 ENCOUNTER — PROCEDURE VISIT (OUTPATIENT)
Dept: CARDIOLOGY CLINIC | Age: 62
End: 2022-10-27
Payer: MEDICARE

## 2022-10-27 DIAGNOSIS — Z95.0 PACEMAKER: Primary | ICD-10-CM

## 2022-10-27 PROCEDURE — 93294 REM INTERROG EVL PM/LDLS PM: CPT | Performed by: INTERNAL MEDICINE

## 2022-10-27 PROCEDURE — 93296 REM INTERROG EVL PM/IDS: CPT | Performed by: INTERNAL MEDICINE

## 2022-10-27 NOTE — PROGRESS NOTES
[facility]     . Morelia Thebes Battery longevity:  12.8 years   Presenting rhythm  AP VS     Atrial impedance 494  RV impedance 399    P wave sensing 2  R wave sensing 20    76.8 % atrial paced  3.3 % RV paced     Atrial threshold 0.625 V  at 0.4ms  RV threshold 1 V at 0.4ms  Mode switches   0

## 2022-11-07 RX ORDER — TORSEMIDE 20 MG/1
TABLET ORAL
Qty: 90 TABLET | Refills: 3 | Status: SHIPPED | OUTPATIENT
Start: 2022-11-07

## 2022-11-07 RX ORDER — SPIRONOLACTONE 25 MG/1
TABLET ORAL
Qty: 90 TABLET | Refills: 3 | Status: SHIPPED | OUTPATIENT
Start: 2022-11-07

## 2023-02-13 ENCOUNTER — TELEPHONE (OUTPATIENT)
Dept: CARDIOLOGY CLINIC | Age: 63
End: 2023-02-13

## 2023-02-13 NOTE — LETTER
March 1, 2023       Wilberto Arshad  [address]      Dear Claude Salk: Our office has not been able to contact you by phone. Please call the office at [phone] regarding the follow up of your pacemaker. If you have any questions or concerns, please don't hesitate to call.     Sincerely,        Denise Molina

## 2023-02-13 NOTE — LETTER
[address] [place] [id number]  Phone: [phone]  Fax: [phone]      Zackery Otaes M.D. February 15, 2023    [address]      Dear Qamar Doan:    PLEASE CALL OUR OFFICE [phone] TO R/S YOUR DEVICE CHECK IN THE OFFICE. If you have any questions or concerns, please don't hesitate to call.     Sincerely,        Nura Kelsey M.D.

## 2023-02-27 ENCOUNTER — PROCEDURE VISIT (OUTPATIENT)
Dept: CARDIOLOGY CLINIC | Age: 63
End: 2023-02-27

## 2023-02-27 DIAGNOSIS — Z95.0 PACEMAKER: Primary | ICD-10-CM

## 2023-03-28 NOTE — TELEPHONE ENCOUNTER
She does have carelink, hopefully she sends. In office appt added in September when she sees Stephan.   Again, hopefully she keeps that appt

## 2023-04-17 ENCOUNTER — TELEPHONE (OUTPATIENT)
Dept: CARDIOLOGY CLINIC | Age: 63
End: 2023-04-17

## 2023-04-17 RX ORDER — QUETIAPINE FUMARATE 25 MG/1
25 TABLET, FILM COATED ORAL 2 TIMES DAILY
COMMUNITY

## 2023-04-17 RX ORDER — DEXTRAN 70 AND HYPROMELLOSE 2910 1; 3 MG/ML; MG/ML
1 SOLUTION/ DROPS OPHTHALMIC EVERY 4 HOURS PRN
COMMUNITY

## 2023-04-17 RX ORDER — DOCUSATE SODIUM 100 MG/1
100 CAPSULE, LIQUID FILLED ORAL 2 TIMES DAILY
COMMUNITY

## 2023-04-17 RX ORDER — FLUTICASONE FUROATE AND VILANTEROL 200; 25 UG/1; UG/1
POWDER RESPIRATORY (INHALATION) DAILY
COMMUNITY

## 2023-04-17 RX ORDER — RAMELTEON 8 MG/1
8 TABLET ORAL NIGHTLY
COMMUNITY

## 2023-04-17 RX ORDER — CLOTRIMAZOLE AND BETAMETHASONE DIPROPIONATE 10; .64 MG/G; MG/G
CREAM TOPICAL 2 TIMES DAILY
COMMUNITY

## 2023-04-17 RX ORDER — OXYCODONE HYDROCHLORIDE AND ACETAMINOPHEN 5; 325 MG/1; MG/1
1 TABLET ORAL EVERY 8 HOURS PRN
COMMUNITY

## 2023-04-17 NOTE — TELEPHONE ENCOUNTER
Patient called office   She is currently at [facility] for rehab with broken foot  Stated she noticed a pill they were giving her in am that she did not recognize and was told it was metolazone  Stated she has been getting this pill daily since 3/23 when she was admitted there. She told them it was only supposed to be PRN and has not taken it since Sat    She is wondering your recommendations?     Called and spoke with nurse Spike Richards  He stated he is looking into metolazone situation  He is going to fax over med list and if patient sched for any labs

## 2023-04-18 LAB
BUN BLDV-MCNC: 51 MG/DL
CALCIUM SERPL-MCNC: 8.9 MG/DL
CHLORIDE BLD-SCNC: 96 MMOL/L
CO2: 32 MMOL/L
CREAT SERPL-MCNC: 1.6 MG/DL
EGFR: 32
GLUCOSE BLD-MCNC: 421 MG/DL
POTASSIUM SERPL-SCNC: 5.3 MMOL/L
SODIUM BLD-SCNC: 138 MMOL/L

## 2023-04-18 NOTE — TELEPHONE ENCOUNTER
Spoke with [employer] Metolazone was not in packets  Not sure what med patient was talking about  Will look into it and call office back  They did received order and BMP was drawn this am

## 2023-05-08 LAB
BUN BLDV-MCNC: 31 MG/DL
CALCIUM SERPL-MCNC: 8.9 MG/DL
CHLORIDE BLD-SCNC: 99 MMOL/L
CO2: 30 MMOL/L
CREAT SERPL-MCNC: 1.3 MG/DL
EGFR: 41
GLUCOSE BLD-MCNC: 101 MG/DL
POTASSIUM SERPL-SCNC: 4.3 MMOL/L
SODIUM BLD-SCNC: 139 MMOL/L

## 2023-05-19 ENCOUNTER — PROCEDURE VISIT (OUTPATIENT)
Dept: CARDIOLOGY CLINIC | Age: 63
End: 2023-05-19

## 2023-05-19 DIAGNOSIS — Z95.0 PACEMAKER: Primary | ICD-10-CM

## 2023-05-19 NOTE — PROGRESS NOTES
[facility] Medtronic Dual Pacemaker   Patient of Urias    Battery 12.3 years    Presenting rhythm APVS    A Impedance 475  RV Impedance 399    P wave sensing 2.3  R wave sensing 19.6    A Threshold 0.625 @ 0.40  A Amplitude 1.50 @ 0.40  RV Thresholds 0.75 @ 0.50  RV Amplitude 2.0 @ 0.40      A Paced 22.5%  V Paced 2%    Programmed Mode DDDR     Afib [place] 0%    Episodes none

## 2023-08-24 ENCOUNTER — PROCEDURE VISIT (OUTPATIENT)
Dept: CARDIOLOGY CLINIC | Age: 63
End: 2023-08-24

## 2023-08-24 DIAGNOSIS — Z95.0 PACEMAKER: Primary | ICD-10-CM

## 2023-08-24 NOTE — PROGRESS NOTES
[facility] medtronic dual pacer     . Jimmie Closs Battery longevity:  12.1 years   Presenting rhythm  AP VS     Atrial impedance 437  RV impedance 418    P wave sensing 2  R wave sensing 20    10.9 % atrial paced  0.5 % RV paced     Atrial threshold 0.625 V  at 0.4ms  RV threshold 0.75 V at 0.4ms  Mode switches   0

## 2023-09-12 ENCOUNTER — OFFICE VISIT (OUTPATIENT)
Dept: CARDIOLOGY CLINIC | Age: 63
End: 2023-09-12

## 2023-09-12 ENCOUNTER — NURSE ONLY (OUTPATIENT)
Dept: CARDIOLOGY CLINIC | Age: 63
End: 2023-09-12
Payer: MEDICARE

## 2023-09-12 VITALS
DIASTOLIC BLOOD PRESSURE: 82 MMHG | SYSTOLIC BLOOD PRESSURE: 144 MMHG | OXYGEN SATURATION: 98 % | HEART RATE: 88 BPM | WEIGHT: 225 LBS | BODY MASS INDEX: 42.51 KG/M2

## 2023-09-12 DIAGNOSIS — I50.32 CHRONIC DIASTOLIC CHF (CONGESTIVE HEART FAILURE), NYHA CLASS 2 (HCC): Primary | ICD-10-CM

## 2023-09-12 DIAGNOSIS — I25.10 CORONARY ARTERY DISEASE INVOLVING NATIVE CORONARY ARTERY OF NATIVE HEART WITHOUT ANGINA PECTORIS: ICD-10-CM

## 2023-09-12 DIAGNOSIS — F17.210 CIGARETTE NICOTINE DEPENDENCE WITHOUT COMPLICATION: ICD-10-CM

## 2023-09-12 DIAGNOSIS — I34.0 MODERATE MITRAL REGURGITATION: ICD-10-CM

## 2023-09-12 DIAGNOSIS — Z95.0 PACEMAKER: Primary | ICD-10-CM

## 2023-09-12 PROCEDURE — 93280 PM DEVICE PROGR EVAL DUAL: CPT | Performed by: INTERNAL MEDICINE

## 2023-09-12 RX ORDER — VENLAFAXINE HYDROCHLORIDE 75 MG/1
CAPSULE, EXTENDED RELEASE ORAL
COMMUNITY
Start: 2023-08-28

## 2023-09-12 RX ORDER — SEMAGLUTIDE 0.68 MG/ML
INJECTION, SOLUTION SUBCUTANEOUS
COMMUNITY
Start: 2023-08-28

## 2023-09-12 RX ORDER — VENLAFAXINE HYDROCHLORIDE 37.5 MG/1
CAPSULE, EXTENDED RELEASE ORAL
COMMUNITY
Start: 2023-08-28

## 2023-09-12 ASSESSMENT — ENCOUNTER SYMPTOMS
ABDOMINAL DISTENTION: 0
COUGH: 0
SHORTNESS OF BREATH: 0

## 2023-09-12 NOTE — PROGRESS NOTES
day  Limit sodium in diet to around 9336-4484 mg/day  Monitor BP  Activity as tolerated     Patient was instructed to call the [address] for any changes in symptoms as noted in AVS.      Return in about 1 year (around 9/12/2024). or sooner if needed     Patient given educational materials - see patient instructions. We discussed the importance of weighing oneself and recording daily. We also discussed the importance of a low sodium diet, higher sodium foods to avoid and better low sodium food options. Patient verbalizes understanding of plan of care using teach back method, and is agreeable to the treatment plan.        Electronically signed by SHERMAN Ireland CNP on 9/12/2023 at 4:00 PM

## 2023-09-12 NOTE — PATIENT INSTRUCTIONS
You may receive a survey regarding the care you received during your visit. Your input is valuable to us. We encourage you to complete and return your survey. We hope you will choose us in the future for your healthcare needs. Your nurses today were Antonia and TRBugHerdve.   Office hours:   Mon-Thurs 8-4:30  Friday 8-12  Phone: [phone]    Continue:  Continue current medications  Daily weights and record  Fluid restriction of 2 Liters per day  Limit sodium in diet to around 2280-9501 mg/day  Monitor BP  Activity as tolerated     Call the [address] for any of the following symptoms:   Weight gain of 2-3 pounds in 1 day or 5 pounds in 1 week  Increased shortness of breath  Shortness of breath while laying down  Cough  Chest pain  Swelling in feet, ankles or legs  Bloating in abdomen  Fatigue

## 2023-09-12 NOTE — PROGRESS NOTES
In Office Medtronic Dual Pacemaker     Battery 12 years    Presenting rhythm AP VS  Underlying AS VS    A Impedance 418  RV Impedance 437    P wave sensing 2.6  R wave sensing >20    A Threshold 0.5 @ 0.40  A Amplitude 1.50 @ 0.40  RV Thresholds 1.0 @ 0.40  RV Amplitude 2.0 @ 0.40      A Paced 50.1%  V Paced 2.8%    Programmed Mode DDDR       Afib [place] 0%    Episodes   none

## 2023-10-11 ENCOUNTER — HOSPITAL ENCOUNTER (OUTPATIENT)
Dept: NON INVASIVE DIAGNOSTICS | Age: 63
Discharge: HOME | End: 2023-10-11
Payer: MEDICARE

## 2023-10-11 DIAGNOSIS — I34.0 MODERATE MITRAL REGURGITATION: ICD-10-CM

## 2023-10-11 DIAGNOSIS — I50.32 CHRONIC DIASTOLIC CHF (CONGESTIVE HEART FAILURE), NYHA CLASS 2 (HCC): ICD-10-CM

## 2023-10-11 PROCEDURE — 93306 TTE W/DOPPLER COMPLETE: CPT

## 2023-10-12 ENCOUNTER — TELEPHONE (OUTPATIENT)
Dept: CARDIOLOGY CLINIC | Age: 63
End: 2023-10-12

## 2023-12-14 ENCOUNTER — PROCEDURE VISIT (OUTPATIENT)
Dept: CARDIOLOGY CLINIC | Age: 63
End: 2023-12-14
Payer: MEDICARE

## 2023-12-14 DIAGNOSIS — Z95.0 PACEMAKER: Primary | ICD-10-CM

## 2023-12-14 PROCEDURE — 93294 REM INTERROG EVL PM/LDLS PM: CPT | Performed by: INTERNAL MEDICINE

## 2023-12-14 PROCEDURE — 93296 REM INTERROG EVL PM/IDS: CPT | Performed by: INTERNAL MEDICINE

## 2023-12-14 NOTE — PROGRESS NOTES
[facility] medtronic dual pacer     . Ines IniguezStarr Battery longevity:  11.8 years on device   Presenting rhythm  AS VS     Atrial impedance 437  RV impedance 418    P wave sensing 1.9  R wave sensing 20    22.8 % atrial paced  0.7 % RV paced     Atrial threshold 0.625 V  at 0.4ms  RV threshold 0.625 V at 0.4ms  Mode switches   0

## 2024-02-16 NOTE — PATIENT INSTRUCTIONS
Total IV Fluids Infused 200 mL. You may receive a survey regarding the care you received during your visit. Your input is valuable to us. We encourage you to complete and return your survey. We hope you will choose us in the future for your healthcare needs.     Continue:  · Continue current medications  · Daily weights and record  · Fluid restriction of 2 Liters per day  · Limit sodium in diet to around 7847-7971 mg/day  · Monitor BP  · Activity as tolerated     Call the Heart Failure Clinic for any of the following symptoms: [phone]   Weight gain of 2-3 pounds in 1 day or 5 pounds in 1 week   Increased shortness of breath   Shortness of breath while laying down   Cough   Chest pain   Swelling in feet, ankles or legs   Tenderness or bloating in the abdomen   Fatigue    Decreased appetite or nausea    Confusion

## 2024-03-21 ENCOUNTER — PROCEDURE VISIT (OUTPATIENT)
Dept: CARDIOLOGY CLINIC | Age: 64
End: 2024-03-21

## 2024-03-21 DIAGNOSIS — Z95.0 PACEMAKER: Primary | ICD-10-CM

## 2024-03-21 NOTE — PROGRESS NOTES
[facility] Medtronic Dual Pacemaker   Patient of Adonay/Ciera    Battery 11.7 years    Presenting rhythm AP VS    A Impedance 475  RV Impedance 399    P wave sensing 1.6  R wave sensing 17.1    A Threshold 0.625 @ 0.40  A Amplitude 1.50 @ 0.40  RV Thresholds 0.5 @ 0.40  RV Amplitude 2.0 @ 0.40      A Paced 27.6%  V Paced 1%    Programmed Mode DDDR       Afib [place] <0.1%    Episodes   3/17/24-12 beats VT vs SVT rates 154

## 2024-04-20 ENCOUNTER — TRANSCRIBE ORDERS (OUTPATIENT)
Dept: ADMINISTRATIVE | Age: 64
End: 2024-04-20

## 2024-04-20 DIAGNOSIS — S93.401A MODERATE RIGHT ANKLE SPRAIN, INITIAL ENCOUNTER: Primary | ICD-10-CM

## 2024-05-31 ENCOUNTER — HOSPITAL ENCOUNTER (OUTPATIENT)
Dept: MRI IMAGING | Age: 64
Discharge: HOME | End: 2024-05-31
Payer: MEDICARE

## 2024-05-31 DIAGNOSIS — S93.401A MODERATE RIGHT ANKLE SPRAIN, INITIAL ENCOUNTER: ICD-10-CM

## 2024-05-31 PROCEDURE — 73721 MRI JNT OF LWR EXTRE W/O DYE: CPT

## 2024-06-27 ENCOUNTER — PROCEDURE VISIT (OUTPATIENT)
Dept: CARDIOLOGY CLINIC | Age: 64
End: 2024-06-27

## 2024-06-27 DIAGNOSIS — Z95.0 PACEMAKER: Primary | ICD-10-CM

## 2024-06-27 NOTE — PROGRESS NOTES
[facility] Medtronic Dual Pacemaker   Patient of Urias    Battery 11.5 years    Presenting rhythm AP VS    A Impedance 475  RV Impedance 418    P wave sensing 2  R wave sensing >20    A Threshold 0.6625 @ 0.  A Amplitude 1.5 @ 0.4  RV Thresholds 0.625 @ 0.4  RV Amplitude 2 @ 0.4      A Paced 24.2%  V Paced 1.7%    Programmed Mode DDDR       Afib [place] 0%    Episodes none

## 2024-08-19 NOTE — PATIENT INSTRUCTIONS
Reviewed most recent lipid panel from 04/2024 which was improved  Continue to monitor   You may receive a survey regarding the care you received during your visit. Your input is valuable to us. We encourage you to complete and return your survey. We hope you will choose us in the future for your healthcare needs.     Continue:  · Continue current medications  · Daily weights and record  · Fluid restriction of 2 Liters per day  · Limit sodium in diet to around 3629-3906 mg/day  · Monitor BP  · Activity as tolerated     Call the Heart Failure Clinic for any of the following symptoms: [phone]   Weight gain of 2-3 pounds in 1 day or 5 pounds in 1 week   Increased shortness of breath   Shortness of breath while laying down   Cough   Chest pain   Swelling in feet, ankles or legs   Tenderness or bloating in the abdomen   Fatigue    Decreased appetite or nausea    Confusion

## 2024-09-18 ENCOUNTER — NURSE ONLY (OUTPATIENT)
Dept: CARDIOLOGY CLINIC | Age: 64
End: 2024-09-18

## 2024-09-18 ENCOUNTER — OFFICE VISIT (OUTPATIENT)
Dept: CARDIOLOGY CLINIC | Age: 64
End: 2024-09-18
Payer: MEDICARE

## 2024-09-18 VITALS
WEIGHT: 229 LBS | RESPIRATION RATE: 18 BRPM | DIASTOLIC BLOOD PRESSURE: 88 MMHG | HEART RATE: 90 BPM | SYSTOLIC BLOOD PRESSURE: 148 MMHG | BODY MASS INDEX: 43.23 KG/M2 | OXYGEN SATURATION: 96 % | HEIGHT: 61 IN

## 2024-09-18 DIAGNOSIS — Z95.0 PACEMAKER: Primary | ICD-10-CM

## 2024-09-18 DIAGNOSIS — I50.32 CHRONIC DIASTOLIC CHF (CONGESTIVE HEART FAILURE), NYHA CLASS 2 (HCC): Primary | ICD-10-CM

## 2024-09-18 DIAGNOSIS — I87.2 VENOUS INSUFFICIENCY: ICD-10-CM

## 2024-09-18 PROCEDURE — 99214 OFFICE O/P EST MOD 30 MIN: CPT | Performed by: NURSE PRACTITIONER

## 2024-09-18 PROCEDURE — 1123F ACP DISCUSS/DSCN MKR DOCD: CPT | Performed by: NURSE PRACTITIONER

## 2024-09-18 PROCEDURE — 3017F COLORECTAL CA SCREEN DOC REV: CPT | Performed by: NURSE PRACTITIONER

## 2024-09-18 PROCEDURE — G8427 DOCREV CUR MEDS BY ELIG CLIN: HCPCS | Performed by: NURSE PRACTITIONER

## 2024-09-18 PROCEDURE — 4004F PT TOBACCO SCREEN RCVD TLK: CPT | Performed by: NURSE PRACTITIONER

## 2024-09-18 PROCEDURE — G8417 CALC BMI ABV UP PARAM F/U: HCPCS | Performed by: NURSE PRACTITIONER

## 2024-09-18 PROCEDURE — G8400 PT W/DXA NO RESULTS DOC: HCPCS | Performed by: NURSE PRACTITIONER

## 2024-09-18 PROCEDURE — 1090F PRES/ABSN URINE INCON ASSESS: CPT | Performed by: NURSE PRACTITIONER

## 2024-09-18 RX ORDER — TORSEMIDE 20 MG/1
20 TABLET ORAL DAILY PRN
Qty: 30 TABLET | Refills: 0
Start: 2024-09-18

## 2024-10-01 ENCOUNTER — TELEPHONE (OUTPATIENT)
Dept: CARDIOLOGY CLINIC | Age: 64
End: 2024-10-01

## 2024-10-01 NOTE — TELEPHONE ENCOUNTER
Holley Seaman, CMA  P [facility] Clinical Staff  Patient has not been seen by Dr. Urias in 2 years.  Pt states she thought since she sees CHF and Pacer that she did not need to see Dr. Urias.  Pt states she still wants to follow with Dr. Urias.    Patient is needing clearance for Hernia Surgery with Dr. Agustin TBD fax [phone]    Per patient to Call Effie on HIPAA at this number - [phone]  Patient states Effie is her transportation.          Comments    VM left for Effie to call back 10/01/2024 BB     I called Effie back and appt scheduled.

## 2024-10-22 ENCOUNTER — OFFICE VISIT (OUTPATIENT)
Dept: CARDIOLOGY CLINIC | Age: 64
End: 2024-10-22
Payer: MEDICARE

## 2024-10-22 VITALS
HEIGHT: 61 IN | SYSTOLIC BLOOD PRESSURE: 136 MMHG | BODY MASS INDEX: 43.61 KG/M2 | WEIGHT: 231 LBS | DIASTOLIC BLOOD PRESSURE: 81 MMHG | HEART RATE: 90 BPM

## 2024-10-22 DIAGNOSIS — Z95.0 PACEMAKER: Primary | ICD-10-CM

## 2024-10-22 DIAGNOSIS — Z01.818 PRE-OP TESTING: ICD-10-CM

## 2024-10-22 DIAGNOSIS — R06.02 SHORTNESS OF BREATH: ICD-10-CM

## 2024-10-22 PROCEDURE — 1090F PRES/ABSN URINE INCON ASSESS: CPT | Performed by: INTERNAL MEDICINE

## 2024-10-22 PROCEDURE — 99213 OFFICE O/P EST LOW 20 MIN: CPT | Performed by: INTERNAL MEDICINE

## 2024-10-22 PROCEDURE — 3017F COLORECTAL CA SCREEN DOC REV: CPT | Performed by: INTERNAL MEDICINE

## 2024-10-22 PROCEDURE — 1123F ACP DISCUSS/DSCN MKR DOCD: CPT | Performed by: INTERNAL MEDICINE

## 2024-10-22 PROCEDURE — G8417 CALC BMI ABV UP PARAM F/U: HCPCS | Performed by: INTERNAL MEDICINE

## 2024-10-22 PROCEDURE — G8484 FLU IMMUNIZE NO ADMIN: HCPCS | Performed by: INTERNAL MEDICINE

## 2024-10-22 PROCEDURE — 93000 ELECTROCARDIOGRAM COMPLETE: CPT | Performed by: INTERNAL MEDICINE

## 2024-10-22 PROCEDURE — 4004F PT TOBACCO SCREEN RCVD TLK: CPT | Performed by: INTERNAL MEDICINE

## 2024-10-22 PROCEDURE — G8400 PT W/DXA NO RESULTS DOC: HCPCS | Performed by: INTERNAL MEDICINE

## 2024-10-22 PROCEDURE — G8427 DOCREV CUR MEDS BY ELIG CLIN: HCPCS | Performed by: INTERNAL MEDICINE

## 2024-10-22 NOTE — PROGRESS NOTES
Cardiac clearance for Dr. Agustin; hernia repair- TBD  Has pulm clearance already from Dr. Lim     Denies any cardiac concerns or symptoms

## 2024-10-22 NOTE — PROGRESS NOTES
[facility] [place] [facility]  [facility] CARDIOLOGY  [address]  [address]  [facility] [address]  Dept: [phone]  Dept Fax: [phone]  Loc: [phone]    Visit Date: 10/22/2024    Ms. Davis is a 64 y.o. female  who presented for:  Chief Complaint   Patient presents with    Follow-up    Cardiac Clearance     eeds clearance for hernia surgery with Dr. Agustin, Fax# [phone]       HPI:   HPI    64 y.o. female with PMHx of COPD on chronic oxygen therapy, anxiety, NIDDM2, hypothyroidism, HLD, severe anemia, GERD, CAD who presents for surgery evaluation for hernia repair.  No chest pain.  No discomfort. No change in symptoms.  She is s/p PM.  She is using more oxygen.  6/2021 stress test shows no significant obstruction.  She has significant STRONG.  She smokes 1 ppd.      ECG:  Undetermined Rhythm   -Diffuse ST depression  +  Diffuse nonspecific T-abnormality -Nondiagnostic -possible digitalis effect, -consider subendocardial injury/ischemia.    ABNORMAL    Current Outpatient Medications:     torsemide (DEMADEX) 20 MG tablet, Take 1 tablet by mouth daily as needed (for swelling, wt gain) As directed, prn, Disp: 30 tablet, Rfl: 0    OZEMPIC, 0.25 OR 0.5 MG/DOSE, 2 MG/3ML SOPN, Prn, Disp: , Rfl:     fluticasone furoate-vilanterol (BREO ELLIPTA) 200-25 MCG/ACT AEPB inhaler, Inhale into the lungs daily, Disp: , Rfl:     Calcium-Vitamin D-Vitamin K 500-100-40 MG-UNT-MCG CHEW, Take by mouth in the morning and at bedtime, Disp: , Rfl:     clotrimazole-betamethasone (LOTRISONE) 1-0.05 % cream, Apply topically 2 times daily Apply topically 2 times daily., Disp: , Rfl:     dextran 70-hypromellose (ARTIFICIAL TEARS) 0.1-0.3 % SOLN opthalmic solution, Place 1 drop into both eyes every 4 hours as needed, Disp: , Rfl:     ramelteon (ROZEREM) 8 MG tablet, Take 1 tablet by mouth nightly, Disp: , Rfl:     QUEtiapine (SEROQUEL) 25 MG tablet, Take 1 tablet by mouth 2 times daily, Disp: , Rfl:

## 2024-11-13 ENCOUNTER — HOSPITAL ENCOUNTER (OUTPATIENT)
Dept: NUCLEAR MEDICINE | Age: 64
Discharge: HOME | End: 2024-11-13
Attending: INTERNAL MEDICINE
Payer: MEDICARE

## 2024-11-13 ENCOUNTER — HOSPITAL ENCOUNTER (OUTPATIENT)
Age: 64
Discharge: HOME | End: 2024-11-15
Attending: INTERNAL MEDICINE
Payer: MEDICARE

## 2024-11-13 DIAGNOSIS — R06.02 SHORTNESS OF BREATH: ICD-10-CM

## 2024-11-13 PROCEDURE — 93017 CV STRESS TEST TRACING ONLY: CPT

## 2024-11-13 PROCEDURE — 93306 TTE W/DOPPLER COMPLETE: CPT

## 2024-11-13 PROCEDURE — 6360000002 HC RX W HCPCS: Performed by: INTERNAL MEDICINE

## 2024-11-13 PROCEDURE — A9500 TC99M SESTAMIBI: HCPCS | Performed by: INTERNAL MEDICINE

## 2024-11-13 PROCEDURE — 3430000000 HC RX DIAGNOSTIC RADIOPHARMACEUTICAL: Performed by: INTERNAL MEDICINE

## 2024-11-13 PROCEDURE — 78452 HT MUSCLE IMAGE SPECT MULT: CPT

## 2024-11-13 RX ORDER — REGADENOSON 0.08 MG/ML
0.4 INJECTION, SOLUTION INTRAVENOUS
Status: COMPLETED | OUTPATIENT
Start: 2024-11-13 | End: 2024-11-13

## 2024-11-13 RX ORDER — TETRAKIS(2-METHOXYISOBUTYLISOCYANIDE)COPPER(I) TETRAFLUOROBORATE 1 MG/ML
9.7 INJECTION, POWDER, LYOPHILIZED, FOR SOLUTION INTRAVENOUS
Status: COMPLETED | OUTPATIENT
Start: 2024-11-13 | End: 2024-11-13

## 2024-11-13 RX ORDER — TETRAKIS(2-METHOXYISOBUTYLISOCYANIDE)COPPER(I) TETRAFLUOROBORATE 1 MG/ML
34.8 INJECTION, POWDER, LYOPHILIZED, FOR SOLUTION INTRAVENOUS
Status: COMPLETED | OUTPATIENT
Start: 2024-11-13 | End: 2024-11-13

## 2024-11-13 RX ADMIN — REGADENOSON 0.4 MG: 0.08 INJECTION, SOLUTION INTRAVENOUS at 14:56

## 2024-11-13 RX ADMIN — Medication 34.8 MILLICURIE: at 14:55

## 2024-11-13 RX ADMIN — Medication 9.7 MILLICURIE: at 13:52

## 2024-11-14 LAB
ECHO AR MAX VEL PISA: 3.8 M/S
ECHO AV CUSP MM: 2.1 CM
ECHO AV PEAK GRADIENT: 9 MMHG
ECHO AV PEAK VELOCITY: 1.5 M/S
ECHO AV REGURGITANT PHT: 847 MS
ECHO AV VELOCITY RATIO: 0.53
ECHO LA AREA 2C: 12.7 CM2
ECHO LA AREA 4C: 10.5 CM2
ECHO LA DIAMETER: 3.3 CM
ECHO LA MAJOR AXIS: 4 CM
ECHO LA MINOR AXIS: 4.3 CM
ECHO LA VOL BP: 27 ML (ref 22–52)
ECHO LA VOL MOD A2C: 31 ML (ref 22–52)
ECHO LA VOL MOD A4C: 22 ML (ref 22–52)
ECHO LV E' LATERAL VELOCITY: 5.1 CM/S
ECHO LV E' SEPTAL VELOCITY: 4.9 CM/S
ECHO LV EF PHYSICIAN: 50 %
ECHO LV FRACTIONAL SHORTENING: 30 % (ref 28–44)
ECHO LV INTERNAL DIMENSION DIASTOLIC: 5.4 CM (ref 3.9–5.3)
ECHO LV INTERNAL DIMENSION SYSTOLIC: 3.8 CM
ECHO LV IVSD: 0.8 CM (ref 0.6–0.9)
ECHO LV MASS 2D: 155 G (ref 67–162)
ECHO LV POSTERIOR WALL DIASTOLIC: 0.8 CM (ref 0.6–0.9)
ECHO LV RELATIVE WALL THICKNESS RATIO: 0.3
ECHO LVOT PEAK GRADIENT: 3 MMHG
ECHO LVOT PEAK VELOCITY: 0.8 M/S
ECHO MV A VELOCITY: 1.03 M/S
ECHO MV E DECELERATION TIME (DT): 309 MS
ECHO MV E VELOCITY: 0.66 M/S
ECHO MV E/A RATIO: 0.64
ECHO MV E/E' LATERAL: 12.94
ECHO MV E/E' RATIO (AVERAGED): 13.21
ECHO MV E/E' SEPTAL: 13.47
ECHO MV REGURGITANT PEAK GRADIENT: 49 MMHG
ECHO MV REGURGITANT PEAK VELOCITY: 3.5 M/S
ECHO PV MAX VELOCITY: 0.6 M/S
ECHO PV PEAK GRADIENT: 1 MMHG
ECHO RV INTERNAL DIMENSION: 2.1 CM
ECHO RV TAPSE: 2.1 CM (ref 1.7–?)
ECHO TV E WAVE: 0.4 M/S
ECHO TV REGURGITANT MAX VELOCITY: 2.46 M/S
ECHO TV REGURGITANT PEAK GRADIENT: 24 MMHG
NUC STRESS EJECTION FRACTION: 76 %
STRESS BASELINE DIAS BP: 58 MMHG
STRESS BASELINE HR: 68 BPM
STRESS BASELINE SYS BP: 131 MMHG
STRESS ESTIMATED WORKLOAD: 1 METS
STRESS PEAK DIAS BP: 61 MMHG
STRESS PEAK SYS BP: 136 MMHG
STRESS PERCENT HR ACHIEVED: 59 %
STRESS POST PEAK HR: 92 BPM
STRESS RATE PRESSURE PRODUCT: NORMAL BPM*MMHG
STRESS ST DEPRESSION: 0 MM
STRESS STAGE 1 BP: NORMAL MMHG
STRESS STAGE 1 DURATION: 1 MIN:SEC
STRESS STAGE 1 HR: 71 BPM
STRESS STAGE 2 BP: NORMAL MMHG
STRESS STAGE 2 DURATION: 1 MIN:SEC
STRESS STAGE 2 HR: 85 BPM
STRESS STAGE 3 BP: NORMAL MMHG
STRESS STAGE 3 DURATION: 1 MIN:SEC
STRESS STAGE 3 HR: 92 BPM
STRESS STAGE RECOVERY 1 BP: NORMAL MMHG
STRESS STAGE RECOVERY 1 DURATION: 1 MIN:SEC
STRESS STAGE RECOVERY 1 HR: 77 BPM
STRESS STAGE RECOVERY 2 BP: NORMAL MMHG
STRESS STAGE RECOVERY 2 DURATION: 1 MIN:SEC
STRESS STAGE RECOVERY 2 HR: 75 BPM
STRESS STAGE RECOVERY 3 BP: NORMAL MMHG
STRESS STAGE RECOVERY 3 DURATION: 1 MIN:SEC
STRESS STAGE RECOVERY 3 HR: 72 BPM
STRESS STAGE RECOVERY 4 BP: NORMAL MMHG
STRESS STAGE RECOVERY 4 DURATION: 2 MIN:SEC
STRESS STAGE RECOVERY 4 HR: 71 BPM
STRESS TARGET HR: 155 BPM
TID: 1.31

## 2024-11-19 ENCOUNTER — TELEPHONE (OUTPATIENT)
Dept: CARDIOLOGY CLINIC | Age: 64
End: 2024-11-19

## 2024-11-19 NOTE — TELEPHONE ENCOUNTER
Results of stress test and echo in [facility].   Is patient clear for hernia repair with Dr. Agustin?  Form in [facility] box awaiting response.

## 2025-05-15 ENCOUNTER — TELEPHONE (OUTPATIENT)
Dept: CARDIOLOGY CLINIC | Age: 65
End: 2025-05-15

## 2025-05-15 NOTE — TELEPHONE ENCOUNTER
Pt called c/o increased swelling in ankles , continues with sob on 2 liters O2,asking if should take torsemide, wt has not weight consistantly today is 237.6 lb.Was moving and not putting feet up as much.    Ciera advise

## (undated) DEVICE — SOLUTION IV IRRIG POUR BRL 0.9% SODIUM CHL 2F7124

## (undated) DEVICE — GLOVE ORTHO 8   MSG9480